# Patient Record
Sex: FEMALE | Race: WHITE | Employment: OTHER | URBAN - METROPOLITAN AREA
[De-identification: names, ages, dates, MRNs, and addresses within clinical notes are randomized per-mention and may not be internally consistent; named-entity substitution may affect disease eponyms.]

---

## 2017-02-25 ENCOUNTER — APPOINTMENT (OUTPATIENT)
Dept: GENERAL RADIOLOGY | Age: 27
End: 2017-02-25
Attending: EMERGENCY MEDICINE
Payer: MEDICAID

## 2017-02-25 ENCOUNTER — HOSPITAL ENCOUNTER (EMERGENCY)
Age: 27
Discharge: HOME | End: 2017-02-25
Attending: EMERGENCY MEDICINE
Payer: MEDICAID

## 2017-02-25 VITALS
TEMPERATURE: 102.6 F | HEIGHT: 63 IN | OXYGEN SATURATION: 97 % | HEART RATE: 136 BPM | RESPIRATION RATE: 22 BRPM | SYSTOLIC BLOOD PRESSURE: 131 MMHG | BODY MASS INDEX: 38.98 KG/M2 | DIASTOLIC BLOOD PRESSURE: 84 MMHG | WEIGHT: 220 LBS

## 2017-02-25 DIAGNOSIS — E86.0 DEHYDRATION: ICD-10-CM

## 2017-02-25 DIAGNOSIS — J10.1 INFLUENZA A: Primary | ICD-10-CM

## 2017-02-25 LAB
ALBUMIN SERPL BCP-MCNC: 4 G/DL (ref 3.4–5)
ALBUMIN/GLOB SERPL: 1 {RATIO} (ref 0.8–1.7)
ALP SERPL-CCNC: 57 U/L (ref 45–117)
ALT SERPL-CCNC: 28 U/L (ref 13–56)
ANION GAP BLD CALC-SCNC: 9 MMOL/L (ref 3–18)
APPEARANCE UR: CLEAR
AST SERPL W P-5'-P-CCNC: 18 U/L (ref 15–37)
BACTERIA URNS QL MICRO: ABNORMAL /HPF
BASOPHILS # BLD AUTO: 0 K/UL (ref 0–0.1)
BASOPHILS # BLD: 0 % (ref 0–2)
BILIRUB SERPL-MCNC: 0.4 MG/DL (ref 0.2–1)
BILIRUB UR QL: NEGATIVE
BUN SERPL-MCNC: 6 MG/DL (ref 7–18)
BUN/CREAT SERPL: 8 (ref 12–20)
CALCIUM SERPL-MCNC: 8.8 MG/DL (ref 8.5–10.1)
CHLORIDE SERPL-SCNC: 105 MMOL/L (ref 100–108)
CO2 SERPL-SCNC: 26 MMOL/L (ref 21–32)
COLOR UR: YELLOW
CREAT SERPL-MCNC: 0.71 MG/DL (ref 0.6–1.3)
DIFFERENTIAL METHOD BLD: ABNORMAL
EOSINOPHIL # BLD: 0.1 K/UL (ref 0–0.4)
EOSINOPHIL NFR BLD: 1 % (ref 0–5)
EPITH CASTS URNS QL MICRO: ABNORMAL /LPF (ref 0–5)
ERYTHROCYTE [DISTWIDTH] IN BLOOD BY AUTOMATED COUNT: 13 % (ref 11.6–14.5)
FLUAV AG NPH QL IA: POSITIVE
FLUBV AG NOSE QL IA: NEGATIVE
GLOBULIN SER CALC-MCNC: 4 G/DL (ref 2–4)
GLUCOSE SERPL-MCNC: 97 MG/DL (ref 74–99)
GLUCOSE UR STRIP.AUTO-MCNC: NEGATIVE MG/DL
HCG UR QL: NEGATIVE
HCT VFR BLD AUTO: 40.3 % (ref 35–45)
HGB BLD-MCNC: 13.8 G/DL (ref 12–16)
HGB UR QL STRIP: NEGATIVE
KETONES UR QL STRIP.AUTO: NEGATIVE MG/DL
LACTATE BLD-SCNC: 1.8 MMOL/L (ref 0.4–2)
LEUKOCYTE ESTERASE UR QL STRIP.AUTO: ABNORMAL
LYMPHOCYTES # BLD AUTO: 17 % (ref 21–52)
LYMPHOCYTES # BLD: 1.4 K/UL (ref 0.9–3.6)
MCH RBC QN AUTO: 28.8 PG (ref 24–34)
MCHC RBC AUTO-ENTMCNC: 34.2 G/DL (ref 31–37)
MCV RBC AUTO: 84 FL (ref 74–97)
MONOCYTES # BLD: 0.8 K/UL (ref 0.05–1.2)
MONOCYTES NFR BLD AUTO: 9 % (ref 3–10)
NEUTS SEG # BLD: 5.9 K/UL (ref 1.8–8)
NEUTS SEG NFR BLD AUTO: 73 % (ref 40–73)
NITRITE UR QL STRIP.AUTO: NEGATIVE
PH UR STRIP: 8.5 [PH] (ref 5–8)
PLATELET # BLD AUTO: 242 K/UL (ref 135–420)
PMV BLD AUTO: 9.6 FL (ref 9.2–11.8)
POTASSIUM SERPL-SCNC: 3.8 MMOL/L (ref 3.5–5.5)
PROT SERPL-MCNC: 8 G/DL (ref 6.4–8.2)
PROT UR STRIP-MCNC: NEGATIVE MG/DL
RBC # BLD AUTO: 4.8 M/UL (ref 4.2–5.3)
RBC #/AREA URNS HPF: ABNORMAL /HPF (ref 0–5)
SODIUM SERPL-SCNC: 140 MMOL/L (ref 136–145)
SP GR UR REFRACTOMETRY: 1.01 (ref 1–1.03)
UROBILINOGEN UR QL STRIP.AUTO: 0.2 EU/DL (ref 0.2–1)
WBC # BLD AUTO: 8.2 K/UL (ref 4.6–13.2)
WBC URNS QL MICRO: ABNORMAL /HPF (ref 0–4)

## 2017-02-25 PROCEDURE — 80053 COMPREHEN METABOLIC PANEL: CPT | Performed by: EMERGENCY MEDICINE

## 2017-02-25 PROCEDURE — 83605 ASSAY OF LACTIC ACID: CPT

## 2017-02-25 PROCEDURE — 87804 INFLUENZA ASSAY W/OPTIC: CPT | Performed by: EMERGENCY MEDICINE

## 2017-02-25 PROCEDURE — 96361 HYDRATE IV INFUSION ADD-ON: CPT

## 2017-02-25 PROCEDURE — 74011250636 HC RX REV CODE- 250/636: Performed by: EMERGENCY MEDICINE

## 2017-02-25 PROCEDURE — 81001 URINALYSIS AUTO W/SCOPE: CPT | Performed by: EMERGENCY MEDICINE

## 2017-02-25 PROCEDURE — 99285 EMERGENCY DEPT VISIT HI MDM: CPT

## 2017-02-25 PROCEDURE — 71020 XR CHEST PA LAT: CPT

## 2017-02-25 PROCEDURE — 96374 THER/PROPH/DIAG INJ IV PUSH: CPT

## 2017-02-25 PROCEDURE — 74011250637 HC RX REV CODE- 250/637: Performed by: EMERGENCY MEDICINE

## 2017-02-25 PROCEDURE — 81025 URINE PREGNANCY TEST: CPT | Performed by: EMERGENCY MEDICINE

## 2017-02-25 PROCEDURE — 85025 COMPLETE CBC W/AUTO DIFF WBC: CPT | Performed by: EMERGENCY MEDICINE

## 2017-02-25 PROCEDURE — 93005 ELECTROCARDIOGRAM TRACING: CPT

## 2017-02-25 RX ORDER — KETOROLAC TROMETHAMINE 30 MG/ML
30 INJECTION, SOLUTION INTRAMUSCULAR; INTRAVENOUS
Status: COMPLETED | OUTPATIENT
Start: 2017-02-25 | End: 2017-02-25

## 2017-02-25 RX ORDER — ACETAMINOPHEN 500 MG
1000 TABLET ORAL
Status: COMPLETED | OUTPATIENT
Start: 2017-02-25 | End: 2017-02-25

## 2017-02-25 RX ADMIN — ACETAMINOPHEN 1000 MG: 500 TABLET ORAL at 16:54

## 2017-02-25 RX ADMIN — SODIUM CHLORIDE 1000 ML: 900 INJECTION, SOLUTION INTRAVENOUS at 18:58

## 2017-02-25 RX ADMIN — SODIUM CHLORIDE 1000 ML: 900 INJECTION, SOLUTION INTRAVENOUS at 16:51

## 2017-02-25 RX ADMIN — KETOROLAC TROMETHAMINE 30 MG: 30 INJECTION, SOLUTION INTRAMUSCULAR at 18:58

## 2017-02-25 NOTE — ED TRIAGE NOTES
Pt, c/o   Cough, nasal & chest congestion,  Body aches, headache ,  Started Monday ,   Fever started wednesday, was seen at [facility] this am. Positive to  Flu,   Here  States feel sick , still having fever

## 2017-02-25 NOTE — ED PROVIDER NOTES
HPI Comments: 4:44 PM Guillermo Smith is a 31 y.o. female who presents to ED c/o persistent flu symptoms onset 17. Pt explains she was diagnosed with the flu at [facility] yesterday (swab). Pt denies receiving Tamaflu because she had symptoms for longer then 48 hours. Pt reports sxs of fever, headache, generalized myalgias, nasal congestion, chills, chest congestion, and cough. Pt states \"my chest is burning from the cough. Pt has not taken any tylenol for fever. LMP was 2016 (due to birth control). Pt denies possibility of pregnancy. Pt reports surgical hx of 3 caesarian section. Pt does not take any medications daily. Pt denies EtOH, tobacco, and recreational drug use. No other concerns at this time. PCP: Jaylene Grant MD      Patient is a 31 y.o. female presenting with cough, nasal congestion, headaches, fever, and general illness. The history is provided by the patient. Cough   Associated symptoms include chills, headaches and myalgias (generalized). Pertinent negatives include no ear pain, no rhinorrhea, no sore throat, no shortness of breath and no wheezing. Nasal Congestion    Associated symptoms include chills, congestion (nasal and chest), cough and headaches. Pertinent negatives include no ear pain, no sinus pressure, no sore throat, no shortness of breath, no rhinorrhea, no neck pain and no neck pain. Headache   Associated symptoms include headaches. Pertinent negatives include no shortness of breath. Fever    Associated symptoms include congestion (nasal and chest), headaches and cough. Pertinent negatives include no sore throat, no shortness of breath and no neck pain. Generalized Body Aches   Associated symptoms include headaches. Pertinent negatives include no shortness of breath.         Past Medical History:   Diagnosis Date    Gestational diabetes        Past Surgical History:   Procedure Laterality Date    HX  SECTION  , ,  Family History:   Problem Relation Age of Onset    Cancer Paternal Grandfather      lung- smoker    Cancer Paternal Grandmother      unknown    Cancer Mother      breast- unknown age       Social History     Social History    Marital status: SINGLE     Spouse name: N/A    Number of children: N/A    Years of education: N/A     Occupational History    Not on file. Social History Main Topics    Smoking status: Never Smoker    Smokeless tobacco: Never Used    Alcohol use No      Comment: rarely    Drug use: No    Sexual activity: Yes     Partners: Male     Birth control/ protection: Condom     Other Topics Concern    Not on file     Social History Narrative         ALLERGIES: Vicodin [hydrocodone-acetaminophen]    Review of Systems   Constitutional: Positive for chills and fever. Negative for activity change, appetite change, diaphoresis, fatigue and unexpected weight change. HENT: Positive for congestion (nasal and chest). Negative for dental problem, drooling, ear discharge, ear pain, facial swelling, hearing loss, mouth sores, nosebleeds, postnasal drip, rhinorrhea, sinus pressure, sneezing, sore throat, tinnitus, trouble swallowing and voice change. Eyes: Negative. Respiratory: Positive for cough. Negative for apnea, choking, chest tightness, shortness of breath, wheezing and stridor. Cardiovascular: Negative. Gastrointestinal: Negative. Endocrine: Negative. Genitourinary: Negative. Musculoskeletal: Positive for myalgias (generalized). Negative for arthralgias, back pain, gait problem, joint swelling, neck pain and neck stiffness. Skin: Negative. Allergic/Immunologic: Negative. Neurological: Positive for headaches. Negative for dizziness, tremors, seizures, syncope, facial asymmetry, speech difficulty, weakness, light-headedness and numbness. Hematological: Negative. Psychiatric/Behavioral: Negative.     All other systems reviewed and are negative. Vitals:    02/25/17 1000 02/25/17 1624   BP: 128/80 157/85   Pulse: (!) 164 (!) 161   Resp: 25 20   Temp: (!) 103.1 °F (39.5 °C) (!) 102.6 °F (39.2 °C)   SpO2:  96%   Weight:  99.8 kg (220 lb)   Height:  5' 3\" (1.6 m)            Physical Exam   Constitutional: She is oriented to person, place, and time. She appears well-developed and well-nourished. Alert and appropriate, mildly ill appearing in no apparent marked discomfort or acute respiratory distress   HENT:   Head: Normocephalic and atraumatic. Right Ear: External ear normal.   Left Ear: External ear normal.   Mouth/Throat: Oropharynx is clear and moist. No oropharyngeal exudate. Eyes: Conjunctivae and EOM are normal. Pupils are equal, round, and reactive to light. Right eye exhibits no discharge. Left eye exhibits no discharge. No scleral icterus. Neck: Normal range of motion. Neck supple. No JVD present. No tracheal deviation present. No thyromegaly present. Cardiovascular: Regular rhythm, normal heart sounds and intact distal pulses. Exam reveals no gallop and no friction rub. No murmur heard. Pulmonary/Chest: Effort normal and breath sounds normal. No respiratory distress. She has no wheezes. She has no rales. She exhibits no tenderness. Abdominal: Soft. Bowel sounds are normal. She exhibits no distension. There is no tenderness. There is no rebound and no guarding. Musculoskeletal: Normal range of motion. She exhibits no edema or tenderness. Lymphadenopathy:     She has no cervical adenopathy. Neurological: She is alert and oriented to person, place, and time. No cranial nerve deficit. Coordination normal.   Skin: Skin is warm. No erythema. No rashes   Psychiatric: She has a normal mood and affect. Her behavior is normal. Judgment and thought content normal.   Nursing note and vitals reviewed.        MDM  Number of Diagnoses or Management Options  Diagnosis management comments: Patient with acute febrile illness c/w influenza without clinical evidence of other SBI. Will still evaluate for possible pneumonia, sepsis, dehydration, or acute electrolyte abnormality. Will begin fluid resuscitation and antipyretics. Patient agrees with this plan.        Amount and/or Complexity of Data Reviewed  Clinical lab tests: ordered and reviewed  Tests in the radiology section of CPT®: ordered and reviewed  Review and summarize past medical records: yes    Risk of Complications, Morbidity, and/or Mortality  Presenting problems: high  Diagnostic procedures: moderate  Management options: moderate    Patient Progress  Patient progress: improved    ED Course       Procedures           Medications ordered:   Medications   sodium chloride 0.9 % bolus infusion 1,000 mL (not administered)   sodium chloride 0.9 % bolus infusion 1,000 mL (1,000 mL IntraVENous New Bag 2/25/17 1651)   sodium chloride 0.9 % bolus infusion 1,000 mL (1,000 mL IntraVENous New Bag 2/25/17 1651)   acetaminophen (TYLENOL) tablet 1,000 mg (1,000 mg Oral Given 2/25/17 1654)         Lab findings:  Labs Reviewed   INFLUENZA A & B AG (RAPID TEST) - Abnormal; Notable for the following:        Result Value    Influenza A Antigen POSITIVE (*)     All other components within normal limits   CBC WITH AUTOMATED DIFF - Abnormal; Notable for the following:     LYMPHOCYTES 17 (*)     All other components within normal limits   METABOLIC PANEL, COMPREHENSIVE - Abnormal; Notable for the following:     BUN 6 (*)     BUN/Creatinine ratio 8 (*)     All other components within normal limits   URINALYSIS W/ RFLX MICROSCOPIC   HCG URINE, QL   POC LACTIC ACID       EKG interpretation: Sinus tachycardia, rate of 165, right axis, normal intervals with non-specific t-wave changes in infero-lateral leads    X-Ray, CT or other radiology findings or impressions:  XR CHEST PA LAT    (Results Pending)   No evidence of acute cardiopulmonary disease; no focal infiltrate, cardiomegaly or effusion    Progress notes, Consult notes or additional Procedure notes: Serial neurologic and cardiovascular examinations were stable and normal except for tachycardia and fever. I discussed the clinical findings and concerns with the patient and she feels comfortable with continued symptomatic treatment and close follow-up as an outpatient. She understands that an early or an occult process cannot be completely ruled out and precautions have been given. The patient agrees with this plan. Reevaluation of patient:   I have reevaluated patient. Patient is feeling improved. 6:30 PM      Dispo:  Patient was discharged in stable condition. Patient is to return to emergency department with any new or worsening condition. Scribe Attestation:     Bhargavi Isbell, chetibing for and in the presence of  Serenity Shay MD February 25, 2017 at 6:29 PM     Physician Attestation:   I personally performed the services described in this documentation, reviewed and edited the documentation which was dictated to the scribe in my presence, and it accurately records my words and actions.  Julianne Valladares MD  February 25, 2017     Signed by: Naseem Leonard, 02/25/17, 4:59 PM

## 2017-02-26 ENCOUNTER — TELEPHONE (OUTPATIENT)
Dept: FAMILY MEDICINE CLINIC | Age: 27
End: 2017-02-26

## 2017-02-26 LAB
ATRIAL RATE: 165 BPM
CALCULATED P AXIS, ECG09: 48 DEGREES
CALCULATED R AXIS, ECG10: 105 DEGREES
CALCULATED T AXIS, ECG11: 20 DEGREES
DIAGNOSIS, 93000: NORMAL
P-R INTERVAL, ECG05: 116 MS
Q-T INTERVAL, ECG07: 296 MS
QRS DURATION, ECG06: 70 MS
QTC CALCULATION (BEZET), ECG08: 490 MS
VENTRICULAR RATE, ECG03: 165 BPM

## 2017-02-26 NOTE — DISCHARGE INSTRUCTIONS
Influenza (Flu): Care Instructions  Your Care Instructions  Influenza (flu) is an infection in the lungs and breathing passages. It is caused by the influenza virus. There are different strains, or types, of the flu virus from year to year. Unlike the common cold, the flu comes on suddenly and the symptoms, such as a cough, congestion, fever, chills, fatigue, aches, and pains, are more severe. These symptoms may last up to 10 days. Although the flu can make you feel very sick, it usually doesn't cause serious health problems. Home treatment is usually all you need for flu symptoms. But your doctor may prescribe antiviral medicine to prevent other health problems, such as pneumonia, from developing. Older people and those who have a long-term health condition, such as lung disease, are most at risk for having pneumonia or other health problems. Follow-up care is a key part of your treatment and safety. Be sure to make and go to all appointments, and call your doctor if you are having problems. Its also a good idea to know your test results and keep a list of the medicines you take. How can you care for yourself at home? · Get plenty of rest.  · Drink plenty of fluids, enough so that your urine is light yellow or clear like water. If you have kidney, heart, or liver disease and have to limit fluids, talk with your doctor before you increase the amount of fluids you drink. · Take an over-the-counter pain medicine if needed, such as acetaminophen (Tylenol), ibuprofen (Advil, Motrin), or naproxen (Aleve), to relieve fever, headache, and muscle aches. Read and follow all instructions on the label. No one younger than 20 should take aspirin. It has been linked to Reye syndrome, a serious illness. · Do not smoke. Smoking can make the flu worse. If you need help quitting, talk to your doctor about stop-smoking programs and medicines. These can increase your chances of quitting for good.   · Breathe moist air from a hot shower or from a sink filled with hot water to help clear a stuffy nose. · Before you use cough and cold medicines, check the label. These medicines may not be safe for young children or for people with certain health problems. · If the skin around your nose and lips becomes sore, put some petroleum jelly on the area. · To ease coughing:  ¨ Drink fluids to soothe a scratchy throat. ¨ Suck on cough drops or plain hard candy. ¨ Take an over-the-counter cough medicine that contains dextromethorphan to help you get some sleep. Read and follow all instructions on the label. ¨ Raise your head at night with an extra pillow. This may help you rest if coughing keeps you awake. · Take any prescribed medicine exactly as directed. Call your doctor if you think you are having a problem with your medicine. To avoid spreading the flu  · Wash your hands regularly, and keep your hands away from your face. · Stay home from school, work, and other public places until you are feeling better and your fever has been gone for at least 24 hours. The fever needs to have gone away on its own without the help of medicine. · Ask people living with you to talk to their doctors about preventing the flu. They may get antiviral medicine to keep from getting the flu from you. · To prevent the flu in the future, get a flu vaccine every fall. Encourage people living with you to get the vaccine. · Cover your mouth when you cough or sneeze. When should you call for help? Call 911 anytime you think you may need emergency care. For example, call if:  · You have severe trouble breathing. Call your doctor now or seek immediate medical care if:  · You have new or worse trouble breathing. · You seem to be getting much sicker. · You feel very sleepy or confused. · You have a new or higher fever. · You get a new rash.   Watch closely for changes in your health, and be sure to contact your doctor if:  · You begin to get better and then get worse. · You are not getting better after 1 week. Where can you learn more? Go to http://matt-rashida.info/. Enter Q125 in the search box to learn more about \"Influenza (Flu): Care Instructions. \"  Current as of: May 23, 2016  Content Version: 11.1  © 6831-2658 [employer]. Care instructions adapted under license by [employer] (which disclaims liability or warranty for this information). If you have questions about a medical condition or this instruction, always ask your healthcare professional. [place] [address] any warranty or liability for your use of this information. Dehydration: Care Instructions  Your Care Instructions  Dehydration happens when your body loses too much fluid. This might happen when you do not drink enough water or you lose large amounts of fluids from your body because of diarrhea, vomiting, or sweating. Severe dehydration can be life-threatening. Water and minerals called electrolytes help put your body fluids back in balance. Learn the early signs of fluid loss, and drink more fluids to prevent dehydration. Follow-up care is a key part of your treatment and safety. Be sure to make and go to all appointments, and call your doctor if you are having problems. It's also a good idea to know your test results and keep a list of the medicines you take. How can you care for yourself at home? · To prevent dehydration, drink plenty of fluids, enough so that your urine is light yellow or clear like water. Choose water and other caffeine-free clear liquids until you feel better. If you have kidney, heart, or liver disease and have to limit fluids, talk with your doctor before you increase the amount of fluids you drink. · If you do not feel like eating or drinking, try taking small sips of water, sports drinks, or other rehydration drinks.   · Get plenty of rest.  To prevent dehydration  · Add more fluids to your diet and daily routine, unless your doctor has told you not to. · During hot weather, drink more fluids. Drink even more fluids if you exercise a lot. Stay away from drinks with alcohol or caffeine. · Watch for the symptoms of dehydration. These include:  ¨ A dry, sticky mouth. ¨ Dark yellow urine, and not much of it. ¨ Dry and sunken eyes. ¨ Feeling very tired. · Learn what problems can lead to dehydration. These include:  ¨ Diarrhea, fever, and vomiting. ¨ Any illness with a fever, such as pneumonia or the flu. ¨ Activities that cause heavy sweating, such as endurance races and heavy outdoor work in hot or humid weather. ¨ Alcohol or drug abuse or withdrawal.  ¨ Certain medicines, such as cold and allergy pills (antihistamines), diet pills (diuretics), and laxatives. ¨ Certain diseases, such as diabetes, cancer, and heart or kidney disease. When should you call for help? Call 911 anytime you think you may need emergency care. For example, call if:  · You passed out (lost consciousness). Call your doctor now or seek immediate medical care if:  · You are confused and cannot think clearly. · You are dizzy or lightheaded, or you feel like you may faint. · You have signs of needing more fluids. You have sunken eyes and a dry mouth, and you pass only a little dark urine. · You cannot keep fluids down. Watch closely for changes in your health, and be sure to contact your doctor if:  · You are not making tears. · Your skin is very dry and sags slowly back into place after you pinch it. · Your mouth and eyes are very dry. Where can you learn more? Go to http://matt-rashida.info/. Enter G550 in the search box to learn more about \"Dehydration: Care Instructions. \"  Current as of: May 27, 2016  Content Version: 11.1  © 6393-8261 [employer]. Care instructions adapted under license by [employer] (which disclaims liability or warranty for this information).  If you have questions about a medical condition or this instruction, always ask your healthcare professional. [place] [address] any warranty or liability for your use of this information.

## 2017-02-26 NOTE — ED NOTES
PT discharged per MD orders. PT was given discharge instructions and PT verbalized understanding.  PT ambulated from ED

## 2017-02-26 NOTE — ED NOTES
PT reports being diagnosed with flu yesterday, was seen and had high heart rate and elevated BP per PT report, today present with heart rate in 160-170s, labs collected and fluids initiated, safety intact, will continue to monitor

## 2017-02-27 ENCOUNTER — OFFICE VISIT (OUTPATIENT)
Dept: FAMILY MEDICINE CLINIC | Facility: CLINIC | Age: 27
End: 2017-02-27

## 2017-02-27 VITALS
OXYGEN SATURATION: 98 % | HEIGHT: 63 IN | TEMPERATURE: 97.7 F | WEIGHT: 228 LBS | RESPIRATION RATE: 18 BRPM | BODY MASS INDEX: 40.4 KG/M2 | HEART RATE: 89 BPM | SYSTOLIC BLOOD PRESSURE: 145 MMHG | DIASTOLIC BLOOD PRESSURE: 90 MMHG

## 2017-02-27 DIAGNOSIS — J11.1 INFLUENZA: Primary | ICD-10-CM

## 2017-02-27 DIAGNOSIS — J02.9 PHARYNGITIS, UNSPECIFIED ETIOLOGY: ICD-10-CM

## 2017-02-27 RX ORDER — DEXTROMETHORPHAN HYDROBROMIDE, GUAIFENESIN 5; 100 MG/5ML; MG/5ML
650 LIQUID ORAL
COMMUNITY
End: 2019-03-06

## 2017-02-27 RX ORDER — GUAIFENESIN DEXTROMETHORPHAN HYDROBROMIDE ORAL SOLUTION 10; 100 MG/5ML; MG/5ML
10 SOLUTION ORAL
Qty: 240 ML | Refills: 0 | Status: SHIPPED | OUTPATIENT
Start: 2017-02-27 | End: 2017-05-05

## 2017-02-27 RX ORDER — OSELTAMIVIR PHOSPHATE 75 MG/1
75 CAPSULE ORAL 2 TIMES DAILY
Qty: 10 CAP | Refills: 0 | Status: SHIPPED | OUTPATIENT
Start: 2017-02-27 | End: 2017-03-04

## 2017-02-27 RX ORDER — PREDNISONE 10 MG/1
TABLET ORAL
Qty: 20 TAB | Refills: 0 | Status: SHIPPED | OUTPATIENT
Start: 2017-02-27 | End: 2017-05-05 | Stop reason: SDUPTHER

## 2017-02-27 NOTE — PROGRESS NOTES
Chief Complaint   Patient presents with    Flu     past week     HPI:    ER follow-up for Flu and elevated heart rate     c/o persistent flu symptoms onset 2/19/17. Pt explains she was diagnosed with the flu at [facility] yesterday (swab). Pt denies receiving Tamaflu because she had symptoms for longer then 48 hours. Pt reports sxs of fever, headache, generalized myalgias, nasal congestion, chills, chest congestion, and cough. Pt states \"my chest and throat is burning from the cough. Pt has taken any tylenol for fever. She was told she had elevated heart rate in 170s at the ER and is following up. She tells me her fever is better uncontrolled but still not feeling well. There is no problem list on file for this patient. Review of Systems   Complete ROS negative except where noted in HPI    Objective:     Visit Vitals    /90    Pulse 89    Temp 97.7 °F (36.5 °C) (Oral)    Resp 18    Ht 5' 3\" (1.6 m)    Wt 228 lb (103.4 kg)    LMP 11/21/2016  Comment: Nexplanon    SpO2 98%    BMI 40.39 kg/m2     No exam data present    Constitutional: NAD, A & O x 3  Lungs: CTAB  Cardiovascular: RRR    Helen was seen today for flu. Diagnoses and all orders for this visit:    Influenza  -     oseltamivir (TAMIFLU) 75 mg capsule; Take 1 Cap by mouth two (2) times a day for 5 days.  -     guaiFENesin-dextromethorphan (GUAIFENESIN-DM)  mg/5 mL liqd; Take 10 mL by mouth every four (4) hours as needed. Pharyngitis, unspecified etiology  -     predniSONE (DELTASONE) 10 mg tablet; 4 tabs for 5 days    ER records reviewed. Fever likely driving tachycardia. Patient is not tachycardic today and temp WNL    I have discussed the diagnosis with the patient and the intended plan as seen in the above orders. The patient has received an after-visit summary and questions were answered concerning future plans. I have discussed medication side effects and warnings with the patient as well.  I have reviewed the plan of care with the patient, accepted their input and they are in agreement with the treatment goals. Patient verbalizes understanding. Follow-up Disposition:  Return if symptoms worsen or fail to improve.

## 2017-02-27 NOTE — PATIENT INSTRUCTIONS
Influenza (Flu): Care Instructions  Your Care Instructions  Influenza (flu) is an infection in the lungs and breathing passages. It is caused by the influenza virus. There are different strains, or types, of the flu virus from year to year. Unlike the common cold, the flu comes on suddenly and the symptoms, such as a cough, congestion, fever, chills, fatigue, aches, and pains, are more severe. These symptoms may last up to 10 days. Although the flu can make you feel very sick, it usually doesn't cause serious health problems. Home treatment is usually all you need for flu symptoms. But your doctor may prescribe antiviral medicine to prevent other health problems, such as pneumonia, from developing. Older people and those who have a long-term health condition, such as lung disease, are most at risk for having pneumonia or other health problems. Follow-up care is a key part of your treatment and safety. Be sure to make and go to all appointments, and call your doctor if you are having problems. Its also a good idea to know your test results and keep a list of the medicines you take. How can you care for yourself at home? · Get plenty of rest.  · Drink plenty of fluids, enough so that your urine is light yellow or clear like water. If you have kidney, heart, or liver disease and have to limit fluids, talk with your doctor before you increase the amount of fluids you drink. · Take an over-the-counter pain medicine if needed, such as acetaminophen (Tylenol), ibuprofen (Advil, Motrin), or naproxen (Aleve), to relieve fever, headache, and muscle aches. Read and follow all instructions on the label. No one younger than 20 should take aspirin. It has been linked to Reye syndrome, a serious illness. · Do not smoke. Smoking can make the flu worse. If you need help quitting, talk to your doctor about stop-smoking programs and medicines. These can increase your chances of quitting for good.   · Breathe moist air from a hot shower or from a sink filled with hot water to help clear a stuffy nose. · Before you use cough and cold medicines, check the label. These medicines may not be safe for young children or for people with certain health problems. · If the skin around your nose and lips becomes sore, put some petroleum jelly on the area. · To ease coughing:  ¨ Drink fluids to soothe a scratchy throat. ¨ Suck on cough drops or plain hard candy. ¨ Take an over-the-counter cough medicine that contains dextromethorphan to help you get some sleep. Read and follow all instructions on the label. ¨ Raise your head at night with an extra pillow. This may help you rest if coughing keeps you awake. · Take any prescribed medicine exactly as directed. Call your doctor if you think you are having a problem with your medicine. To avoid spreading the flu  · Wash your hands regularly, and keep your hands away from your face. · Stay home from school, work, and other public places until you are feeling better and your fever has been gone for at least 24 hours. The fever needs to have gone away on its own without the help of medicine. · Ask people living with you to talk to their doctors about preventing the flu. They may get antiviral medicine to keep from getting the flu from you. · To prevent the flu in the future, get a flu vaccine every fall. Encourage people living with you to get the vaccine. · Cover your mouth when you cough or sneeze. When should you call for help? Call 911 anytime you think you may need emergency care. For example, call if:  · You have severe trouble breathing. Call your doctor now or seek immediate medical care if:  · You have new or worse trouble breathing. · You seem to be getting much sicker. · You feel very sleepy or confused. · You have a new or higher fever. · You get a new rash.   Watch closely for changes in your health, and be sure to contact your doctor if:  · You begin to get better and then get worse. · You are not getting better after 1 week. Where can you learn more? Go to http://matt-rashida.info/. Enter C785 in the search box to learn more about \"Influenza (Flu): Care Instructions. \"  Current as of: May 23, 2016  Content Version: 11.1  © 2024-0887 [employer]icianPortal. Care instructions adapted under license by [employer] (which disclaims liability or warranty for this information). If you have questions about a medical condition or this instruction, always ask your healthcare professional. [address] any warranty or liability for your use of this information. Sore Throat: Care Instructions  Your Care Instructions    Infection by bacteria or a virus causes most sore throats. Cigarette smoke, dry air, air pollution, allergies, and yelling can also cause a sore throat. Sore throats can be painful and annoying. Fortunately, most sore throats go away on their own. If you have a bacterial infection, your doctor may prescribe antibiotics. Follow-up care is a key part of your treatment and safety. Be sure to make and go to all appointments, and call your doctor if you are having problems. It's also a good idea to know your test results and keep a list of the medicines you take. How can you care for yourself at home? · If your doctor prescribed antibiotics, take them as directed. Do not stop taking them just because you feel better. You need to take the full course of antibiotics. · Gargle with warm salt water once an hour to help reduce swelling and relieve discomfort. Use 1 teaspoon of salt mixed in 1 cup of warm water. · Take an over-the-counter pain medicine, such as acetaminophen (Tylenol), ibuprofen (Advil, Motrin), or naproxen (Aleve). Read and follow all instructions on the label. · Be careful when taking over-the-counter cold or flu medicines and Tylenol at the same time.  Many of these medicines have acetaminophen, which is Tylenol. Read the labels to make sure that you are not taking more than the recommended dose. Too much acetaminophen (Tylenol) can be harmful. · Drink plenty of fluids. Fluids may help soothe an irritated throat. Hot fluids, such as tea or soup, may help decrease throat pain. · Use over-the-counter throat lozenges to soothe pain. Regular cough drops or hard candy may also help. These should not be given to young children because of the risk of choking. · Do not smoke or allow others to smoke around you. If you need help quitting, talk to your doctor about stop-smoking programs and medicines. These can increase your chances of quitting for good. · Use a vaporizer or humidifier to add moisture to your bedroom. Follow the directions for cleaning the machine. When should you call for help? Call your doctor now or seek immediate medical care if:  · You have new or worse trouble swallowing. · Your sore throat gets much worse on one side. Watch closely for changes in your health, and be sure to contact your doctor if you do not get better as expected. Where can you learn more? Go to http://matt-rashida.info/. Enter 062 [id number] 80 [id number] in the search box to learn more about \"Sore Throat: Care Instructions. \"  Current as of: July 29, 2016  Content Version: 11.1  © 9157-1664 [employer]. Care instructions adapted under license by [employer] (which disclaims liability or warranty for this information). If you have questions about a medical condition or this instruction, always ask your healthcare professional. [place] [address] any warranty or liability for your use of this information.

## 2017-02-27 NOTE — TELEPHONE ENCOUNTER
Pt called on 2/25/17 via answering service. Pt reports temp of 103.8. Pt advised to seek emergency care.

## 2017-02-27 NOTE — MR AVS SNAPSHOT
Visit Information Date & Time Provider Department Dept. Phone Encounter #  
 2/27/2017 12:00 PM Amy Sullivan MD [facility] [id number] Follow-up Instructions Return if symptoms worsen or fail to improve. Your Appointments 2/27/2017 12:00 PM  
SAME DAY with Amy Sullivan MD  
[facility] ([address]) Appt Note: poss flu symptoms 14 [facility] [address] [place] [address]  
[phone]  
  
   
 14 [facility] [address]  
  
    
 6/29/2017  9:00 AM  
PHYSICAL with Amy Sullivan MD  
[facility] ([address]) Appt Note: 1 yr f/u-Physical  
 14 [facility] 1 [facility] [id number] [phone]  
  
   
 14 [facility] [address] [place] [address] Upcoming Health Maintenance Date Due  
 PAP AKA CERVICAL CYTOLOGY 1/1/2016 INFLUENZA AGE 9 TO ADULT 8/1/2016 DTaP/Tdap/Td series (2 - Td) 10/15/2023 Allergies as of 2/27/2017  Review Complete On: 2/27/2017 By: Amy Sullivan MD  
  
 Severity Noted Reaction Type Reactions Vicodin [Hydrocodone-acetaminophen]  09/13/2012    Anxiety Current Immunizations  Never Reviewed Name Date Influenza Vaccine 10/28/2015 Tdap 10/15/2013  6:10 PM  
  
 Not reviewed this visit You Were Diagnosed With   
  
 Codes Comments Influenza    -  Primary ICD-10-CM: J11.1 ICD-9-CM: 174.2 Pharyngitis, unspecified etiology     ICD-10-CM: J02.9 ICD-9-CM: 159 Vitals BP  
  
  
  
  
  
 145/90 BMI and BSA Data Body Mass Index Body Surface Area  
 40.39 kg/m 2 2.14 m 2 Preferred Pharmacy Pharmacy Name Phone [id number]. Amadeo Goldberg, [address] [facility] [phone] Your Updated Medication List  
  
   
 This list is accurate as of: 17 11:54 AM.  Always use your most recent med list.  
  
  
  
  
 albuterol 90 mcg/actuation inhaler Commonly known as:  PROVENTIL HFA, VENTOLIN HFA, PROAIR HFA Take 1-2 Puffs by inhalation every four (4) hours as needed for Wheezing. fluticasone 50 mcg/actuation nasal spray Commonly known as:  Shelvia Birks 2 Sprays by Both Nostrils route daily. Administer to right and left nostrils. guaiFENesin-dextromethorphan  mg/5 mL Liqd Commonly known as:  guaiFENesin-DM Take 10 mL by mouth every four (4) hours as needed. ibuprofen 600 mg tablet Commonly known as:  MOTRIN Take 1 Tab by mouth every eight (8) hours as needed for Pain. oseltamivir 75 mg capsule Commonly known as:  TAMIFLU Take 1 Cap by mouth two (2) times a day for 5 days. predniSONE 10 mg tablet Commonly known as:  DELTASONE  
4 tabs for 5 days TYLENOL ARTHRITIS PAIN 650 mg CR tablet Generic drug:  acetaminophen Take 650 mg by mouth every six (6) hours as needed for Pain. Prescriptions Sent to Pharmacy Refills  
 oseltamivir (TAMIFLU) 75 mg capsule 0 Sig: Take 1 Cap by mouth two (2) times a day for 5 days. Class: Normal  
 Pharmacy: [id number] Ph #: [phone] Route: Oral  
 predniSONE (DELTASONE) 10 mg tablet 0 Si tabs for 5 days Class: Normal  
 Pharmacy: [id number] Ph #: [phone]  
 guaiFENesin-dextromethorphan (GUAIFENESIN-DM)  mg/5 mL liqd 0 Sig: Take 10 mL by mouth every four (4) hours as needed. Class: Normal  
 Pharmacy: [id number] [address] Ph #: [phone] Route: Oral  
  
Follow-up Instructions Return if symptoms worsen or fail to improve. Patient Instructions Influenza (Flu): Care Instructions Your Care Instructions Influenza (flu) is an infection in the lungs and breathing passages. It is caused by the influenza virus. There are different strains, or types, of the flu virus from year to year. Unlike the common cold, the flu comes on suddenly and the symptoms, such as a cough, congestion, fever, chills, fatigue, aches, and pains, are more severe. These symptoms may last up to 10 days. Although the flu can make you feel very sick, it usually doesn't cause serious health problems. Home treatment is usually all you need for flu symptoms. But your doctor may prescribe antiviral medicine to prevent other health problems, such as pneumonia, from developing. Older people and those who have a long-term health condition, such as lung disease, are most at risk for having pneumonia or other health problems. Follow-up care is a key part of your treatment and safety. Be sure to make and go to all appointments, and call your doctor if you are having problems. Its also a good idea to know your test results and keep a list of the medicines you take. How can you care for yourself at home? · Get plenty of rest. 
· Drink plenty of fluids, enough so that your urine is light yellow or clear like water. If you have kidney, heart, or liver disease and have to limit fluids, talk with your doctor before you increase the amount of fluids you drink. · Take an over-the-counter pain medicine if needed, such as acetaminophen (Tylenol), ibuprofen (Advil, Motrin), or naproxen (Aleve), to relieve fever, headache, and muscle aches. Read and follow all instructions on the label. No one younger than 20 should take aspirin. It has been linked to Reye syndrome, a serious illness. · Do not smoke. Smoking can make the flu worse. If you need help quitting, talk to your doctor about stop-smoking programs and medicines. These can increase your chances of quitting for good.  
· Breathe moist air from a hot shower or from a sink filled with hot water to help clear a stuffy nose. · Before you use cough and cold medicines, check the label. These medicines may not be safe for young children or for people with certain health problems. · If the skin around your nose and lips becomes sore, put some petroleum jelly on the area. · To ease coughing: ¨ Drink fluids to soothe a scratchy throat. ¨ Suck on cough drops or plain hard candy. ¨ Take an over-the-counter cough medicine that contains dextromethorphan to help you get some sleep. Read and follow all instructions on the label. ¨ Raise your head at night with an extra pillow. This may help you rest if coughing keeps you awake. · Take any prescribed medicine exactly as directed. Call your doctor if you think you are having a problem with your medicine. To avoid spreading the flu · Wash your hands regularly, and keep your hands away from your face. · Stay home from school, work, and other public places until you are feeling better and your fever has been gone for at least 24 hours. The fever needs to have gone away on its own without the help of medicine. · Ask people living with you to talk to their doctors about preventing the flu. They may get antiviral medicine to keep from getting the flu from you. · To prevent the flu in the future, get a flu vaccine every fall. Encourage people living with you to get the vaccine. · Cover your mouth when you cough or sneeze. When should you call for help? Call 911 anytime you think you may need emergency care. For example, call if: 
· You have severe trouble breathing. Call your doctor now or seek immediate medical care if: 
· You have new or worse trouble breathing. · You seem to be getting much sicker. · You feel very sleepy or confused. · You have a new or higher fever. · You get a new rash. Watch closely for changes in your health, and be sure to contact your doctor if: 
· You begin to get better and then get worse. · You are not getting better after 1 week. Where can you learn more? Go to http://matt-rashida.info/. Enter A792 in the search box to learn more about \"Influenza (Flu): Care Instructions. \" Current as of: May 23, 2016 Content Version: 11.1 © 5305-1542 Klood. Care instructions adapted under license by [employer] (which disclaims liability or warranty for this information). If you have questions about a medical condition or this instruction, always ask your healthcare professional. [address] any warranty or liability for your use of this information. Sore Throat: Care Instructions Your Care Instructions Infection by bacteria or a virus causes most sore throats. Cigarette smoke, dry air, air pollution, allergies, and yelling can also cause a sore throat. Sore throats can be painful and annoying. Fortunately, most sore throats go away on their own. If you have a bacterial infection, your doctor may prescribe antibiotics. Follow-up care is a key part of your treatment and safety. Be sure to make and go to all appointments, and call your doctor if you are having problems. It's also a good idea to know your test results and keep a list of the medicines you take. How can you care for yourself at home? · If your doctor prescribed antibiotics, take them as directed. Do not stop taking them just because you feel better. You need to take the full course of antibiotics. · Gargle with warm salt water once an hour to help reduce swelling and relieve discomfort. Use 1 teaspoon of salt mixed in 1 cup of warm water. · Take an over-the-counter pain medicine, such as acetaminophen (Tylenol), ibuprofen (Advil, Motrin), or naproxen (Aleve). Read and follow all instructions on the label. · Be careful when taking over-the-counter cold or flu medicines and Tylenol at the same time.  Many of these medicines have acetaminophen, which is Tylenol. Read the labels to make sure that you are not taking more than the recommended dose. Too much acetaminophen (Tylenol) can be harmful. · Drink plenty of fluids. Fluids may help soothe an irritated throat. Hot fluids, such as tea or soup, may help decrease throat pain. · Use over-the-counter throat lozenges to soothe pain. Regular cough drops or hard candy may also help. These should not be given to young children because of the risk of choking. · Do not smoke or allow others to smoke around you. If you need help quitting, talk to your doctor about stop-smoking programs and medicines. These can increase your chances of quitting for good. · Use a vaporizer or humidifier to add moisture to your bedroom. Follow the directions for cleaning the machine. When should you call for help? Call your doctor now or seek immediate medical care if: 
· You have new or worse trouble swallowing. · Your sore throat gets much worse on one side. Watch closely for changes in your health, and be sure to contact your doctor if you do not get better as expected. Where can you learn more? Go to http://matt-rashida.info/. Enter [id number] in the search box to learn more about \"Sore Throat: Care Instructions. \" Current as of: July 29, 2016 Content Version: 11.1 © 7758-7782 Shidonni, Incorporated. Care instructions adapted under license by [employer] (which disclaims liability or warranty for this information). If you have questions about a medical condition or this instruction, always ask your healthcare professional. [place] [address] any warranty or liability for your use of this information. Introducing [facility]! Dear Samina Bentley: 
Thank you for requesting a Mango DSP account. Our records indicate that you already have an active Mango DSP account. You can access your account anytime at [email or web address] Did you know that you can access your hospital and ER discharge instructions at any time in [employer]? You can also review all of your test results from your hospital stay or ER visit. Additional Information If you have questions, please visit the Frequently Asked Questions section of the [employer] website at [email or web address] Remember, [employer] is NOT to be used for urgent needs. For medical emergencies, dial 911. Now available from your iPhone and Android! Please provide this summary of care documentation to your next provider. Your primary care clinician is listed as Vicky Arellano. If you have any questions after today's visit, please call [phone].

## 2017-05-05 ENCOUNTER — OFFICE VISIT (OUTPATIENT)
Dept: FAMILY MEDICINE CLINIC | Facility: CLINIC | Age: 27
End: 2017-05-05

## 2017-05-05 VITALS
TEMPERATURE: 98.1 F | SYSTOLIC BLOOD PRESSURE: 130 MMHG | WEIGHT: 234 LBS | HEIGHT: 63 IN | RESPIRATION RATE: 16 BRPM | DIASTOLIC BLOOD PRESSURE: 95 MMHG | HEART RATE: 87 BPM | OXYGEN SATURATION: 97 % | BODY MASS INDEX: 41.46 KG/M2

## 2017-05-05 DIAGNOSIS — L25.9 CONTACT DERMATITIS, UNSPECIFIED CONTACT DERMATITIS TYPE, UNSPECIFIED TRIGGER: Primary | ICD-10-CM

## 2017-05-05 RX ORDER — PREDNISONE 10 MG/1
TABLET ORAL
Qty: 20 TAB | Refills: 0 | Status: SHIPPED | OUTPATIENT
Start: 2017-05-05 | End: 2019-03-06

## 2017-05-05 RX ORDER — DIPHENHYDRAMINE HCL 25 MG
50 CAPSULE ORAL
COMMUNITY
End: 2019-11-10

## 2017-05-05 RX ORDER — TRIAMCINOLONE ACETONIDE 1 MG/G
OINTMENT TOPICAL 2 TIMES DAILY
Qty: 30 G | Refills: 0 | Status: SHIPPED | OUTPATIENT
Start: 2017-05-05 | End: 2019-03-06

## 2017-05-05 NOTE — PATIENT INSTRUCTIONS
Dermatitis: Care Instructions  Your Care Instructions  Dermatitis is the general name used for any rash or inflammation of the skin. Different kinds of dermatitis cause different kinds of rashes. Common causes of a rash include new medicines, plants (such as poison oak or poison ivy), heat, and stress. Certain illnesses can also cause a rash. An allergic reaction to something that touches your skin, such as latex, nickel, or poison ivy, is called contact dermatitis. Contact dermatitis may also be caused by something that irritates the skin, such as bleach, a chemical, or soap. These types of rashes cannot be spread from person to person. How long your rash will last depends on what caused it. Rashes may last a few days or months. Follow-up care is a key part of your treatment and safety. Be sure to make and go to all appointments, and call your doctor if you are having problems. It's also a good idea to know your test results and keep a list of the medicines you take. How can you care for yourself at home? · Do not scratch the rash. Cut your nails short, and file them smooth. Or wear gloves if this helps keep you from scratching. · Wash the area with water only. Pat dry. · Put cold, wet cloths on the rash to reduce itching. · Keep cool, and stay out of the sun. · Leave the rash open to the air as much as possible. · If the rash itches, use hydrocortisone cream. Follow the directions on the label. Calamine lotion may help for plant rashes. · Take an over-the-counter antihistamine, such as diphenhydramine (Benadryl) or loratadine (Claritin), to help calm the itching. Read and follow all instructions on the label. · If your doctor prescribed a cream, use it as directed. If your doctor prescribed medicine, take it exactly as directed. When should you call for help?   Call your doctor now or seek immediate medical care if:  · You have symptoms of infection, such as:  ¨ Increased pain, swelling, warmth, or redness. ¨ Red streaks leading from the area. ¨ Pus draining from the area. ¨ A fever. · You have joint pain along with the rash. Watch closely for changes in your health, and be sure to contact your doctor if:  · Your rash is changing or getting worse. · You are not getting better as expected. Where can you learn more? Go to http://matt-rashida.info/. Enter [phone] [id number] in the search box to learn more about \"Dermatitis: Care Instructions. \"  Current as of: October 13, 2016  Content Version: 11.2  © 7991-9759 CommonFloor. Care instructions adapted under license by [employer] (which disclaims liability or warranty for this information). If you have questions about a medical condition or this instruction, always ask your healthcare professional. [address] any warranty or liability for your use of this information.

## 2017-05-05 NOTE — MR AVS SNAPSHOT
Visit Information Date & Time Provider Department Dept. Phone Encounter #  
 5/5/2017  2:00 PM Emy Leon NP [employer] [phone] [id number] Follow-up Instructions Return if symptoms worsen or fail to improve. Your Appointments 6/29/2017  9:00 AM  
PHYSICAL with Leilani Mckeon MD  
[facility] ([facility]) Appt Note: 1 yr f/u-Physical  
 [address] [address]  
[phone]  
  
   
 [address] Upcoming Health Maintenance Date Due  
 PAP AKA CERVICAL CYTOLOGY 1/1/2016 INFLUENZA AGE 9 TO ADULT 8/1/2017 DTaP/Tdap/Td series (2 - Td) 10/15/2023 Allergies as of 5/5/2017  Review Complete On: 5/5/2017 By: Kirsten Delcid Severity Noted Reaction Type Reactions Vicodin [Hydrocodone-acetaminophen]  09/13/2012    Anxiety Current Immunizations  Never Reviewed Name Date Influenza Vaccine 10/28/2015 Tdap 10/15/2013  6:10 PM  
  
 Not reviewed this visit You Were Diagnosed With   
  
 Codes Comments Contact dermatitis, unspecified contact dermatitis type, unspecified trigger    -  Primary ICD-10-CM: L25.9 ICD-9-CM: 692.9 Vitals BP Pulse Temp Resp Height(growth percentile) Weight(growth percentile) (!) 130/95 87 98.1 °F (36.7 °C) 16 5' 3\" (1.6 m) 234 lb (106.1 kg) SpO2 BMI OB Status Smoking Status 97% 41.45 kg/m2 Chemically Induced Never Smoker Vitals History BMI and BSA Data Body Mass Index Body Surface Area  
 41.45 kg/m 2 2.17 m 2 Preferred Pharmacy Pharmacy Name Phone [id number]. [facility] [address] [facility] [phone] Your Updated Medication List  
  
   
This list is accurate as of: 5/5/17  2:27 PM.  Always use your most recent med list.  
  
  
  
  
 albuterol 90 mcg/actuation inhaler Commonly known as:  PROVENTIL HFA, VENTOLIN HFA, PROAIR HFA Take 1-2 Puffs by inhalation every four (4) hours as needed for Wheezing. BENADRYL 25 mg capsule Generic drug:  diphenhydrAMINE Take 50 mg by mouth two (2) times a day. fluticasone 50 mcg/actuation nasal spray Commonly known as:  Peyton Najjar 2 Sprays by Both Nostrils route daily. Administer to right and left nostrils. ibuprofen 600 mg tablet Commonly known as:  MOTRIN Take 1 Tab by mouth every eight (8) hours as needed for Pain. predniSONE 10 mg tablet Commonly known as:  DELTASONE  
4 tabs for 5 days  
  
 triamcinolone acetonide 0.1 % ointment Commonly known as:  KENALOG Apply  to affected area two (2) times a day. use thin layer TYLENOL ARTHRITIS PAIN 650 mg CR tablet Generic drug:  acetaminophen Take 650 mg by mouth every six (6) hours as needed for Pain. Prescriptions Sent to Pharmacy Refills  
 predniSONE (DELTASONE) 10 mg tablet 0 Si tabs for 5 days Class: Normal  
 Pharmacy: [id number]. [address] Ph #: [phone]  
 triamcinolone acetonide (KENALOG) 0.1 % ointment 0 Sig: Apply  to affected area two (2) times a day. use thin layer Class: Normal  
 Pharmacy: [id number]. [address] Ph #: [phone] Route: Topical  
  
Follow-up Instructions Return if symptoms worsen or fail to improve. Patient Instructions Dermatitis: Care Instructions Your Care Instructions Dermatitis is the general name used for any rash or inflammation of the skin. Different kinds of dermatitis cause different kinds of rashes. Common causes of a rash include new medicines, plants (such as poison oak or poison ivy), heat, and stress. Certain illnesses can also cause a rash.  
An allergic reaction to something that touches your skin, such as latex, nickel, or poison ivy, is called contact dermatitis. Contact dermatitis may also be caused by something that irritates the skin, such as bleach, a chemical, or soap. These types of rashes cannot be spread from person to person. How long your rash will last depends on what caused it. Rashes may last a few days or months. Follow-up care is a key part of your treatment and safety. Be sure to make and go to all appointments, and call your doctor if you are having problems. It's also a good idea to know your test results and keep a list of the medicines you take. How can you care for yourself at home? · Do not scratch the rash. Cut your nails short, and file them smooth. Or wear gloves if this helps keep you from scratching. · Wash the area with water only. Pat dry. · Put cold, wet cloths on the rash to reduce itching. · Keep cool, and stay out of the sun. · Leave the rash open to the air as much as possible. · If the rash itches, use hydrocortisone cream. Follow the directions on the label. Calamine lotion may help for plant rashes. · Take an over-the-counter antihistamine, such as diphenhydramine (Benadryl) or loratadine (Claritin), to help calm the itching. Read and follow all instructions on the label. · If your doctor prescribed a cream, use it as directed. If your doctor prescribed medicine, take it exactly as directed. When should you call for help? Call your doctor now or seek immediate medical care if: 
· You have symptoms of infection, such as: 
¨ Increased pain, swelling, warmth, or redness. ¨ Red streaks leading from the area. ¨ Pus draining from the area. ¨ A fever. · You have joint pain along with the rash. Watch closely for changes in your health, and be sure to contact your doctor if: 
· Your rash is changing or getting worse. · You are not getting better as expected. Where can you learn more? Go to http://matt-rashida.info/. Enter [phone] in the search box to learn more about \"Dermatitis: Care Instructions. \" Current as of: October 13, 2016 Content Version: 11.2 © 1899-7776 [employer]. Care instructions adapted under license by [employer] (which disclaims liability or warranty for this information). If you have questions about a medical condition or this instruction, always ask your healthcare professional. [place] [address] any warranty or liability for your use of this information. Introducing [facility]! Dear Brett Cartwright: 
Thank you for requesting a Gourmant account. Our records indicate that you already have an active Gourmant account. You can access your account anytime at [email or web address] Did you know that you can access your hospital and ER discharge instructions at any time in Gourmant? You can also review all of your test results from your hospital stay or ER visit. Additional Information If you have questions, please visit the Frequently Asked Questions section of the Gourmant website at [email or web address] Remember, Gourmant is NOT to be used for urgent needs. For medical emergencies, dial 911. Now available from your iPhone and Android! Please provide this summary of care documentation to your next provider. Your primary care clinician is listed as Kimberly Mera. If you have any questions after today's visit, please call [phone].

## 2017-05-05 NOTE — PROGRESS NOTES
HISTORY OF PRESENT ILLNESS  Darya Short is a 31 y.o. female. HPI Comments: Acute care visit with c/o red rash to her rt lower arm x 1 week. She does not recall contact with irritants. Denies any shortness of breath or wheezing. She does recall getting a tattoo on the same arm about 2 weeks ago. She takes benadryl daily. Skin Problem   The history is provided by the patient. This is a new problem. The current episode started more than 2 days ago. The problem occurs constantly. The problem has not changed since onset. Pertinent negatives include no chest pain, no abdominal pain, no headaches and no shortness of breath. She has tried Benadryl for the symptoms. Review of Systems   Constitutional: Negative. Respiratory: Negative. Negative for shortness of breath. Cardiovascular: Negative for chest pain. Gastrointestinal: Negative for abdominal pain. Genitourinary: Negative. Musculoskeletal: Negative. Skin: Positive for rash. Neurological: Negative. Negative for headaches. Endo/Heme/Allergies: Positive for environmental allergies. Past Medical History:   Diagnosis Date    Gestational diabetes      Past Surgical History:   Procedure Laterality Date    HX  SECTION  , ,      Current Outpatient Prescriptions on File Prior to Visit   Medication Sig Dispense Refill    acetaminophen (TYLENOL ARTHRITIS PAIN) 650 mg CR tablet Take 650 mg by mouth every six (6) hours as needed for Pain.  ibuprofen (MOTRIN) 600 mg tablet Take 1 Tab by mouth every eight (8) hours as needed for Pain. 21 Tab 0    fluticasone (FLONASE) 50 mcg/actuation nasal spray 2 Sprays by Both Nostrils route daily. Administer to right and left nostrils. 1 Bottle 6    albuterol (PROVENTIL HFA, VENTOLIN HFA, PROAIR HFA) 90 mcg/actuation inhaler Take 1-2 Puffs by inhalation every four (4) hours as needed for Wheezing.  1 Inhaler 6     No current facility-administered medications on file prior to visit. Allergies and Intolerances: Allergies   Allergen Reactions    Vicodin [Hydrocodone-Acetaminophen] Anxiety       Family History:   Family History   Problem Relation Age of Onset    Cancer Paternal Grandfather      lung- smoker    Cancer Paternal Grandmother      unknown    Cancer Mother      breast- unknown age       Social History:   She  reports that she has never smoked. She has never used smokeless tobacco. She  reports that she does not drink alcohol. Vitals:   Visit Vitals    BP (!) 130/95    Pulse 87    Temp 98.1 °F (36.7 °C)    Resp 16    Ht 5' 3\" (1.6 m)    Wt 234 lb (106.1 kg)    SpO2 97%    BMI 41.45 kg/m2     Body surface area is 2.17 meters squared. Physical Exam   Constitutional: She is oriented to person, place, and time. She appears well-developed and well-nourished. HENT:   Head: Atraumatic. Cardiovascular: Normal rate. Pulmonary/Chest: Effort normal.   Neurological: She is alert and oriented to person, place, and time. Skin: Skin is warm. Psychiatric: She has a normal mood and affect. Her behavior is normal.   Vitals reviewed. ASSESSMENT and PLAN    ICD-10-CM ICD-9-CM    1. Contact dermatitis, unspecified contact dermatitis type, unspecified trigger L25.9 692.9 predniSONE (DELTASONE) 10 mg tablet      triamcinolone acetonide (KENALOG) 0.1 % ointment     Follow-up Disposition:  Return if symptoms worsen or fail to improve. reviewed medications and side effects in detail    - Alarm signals discussed. ER precautions  - Plan of care reviewed with patient. Understanding verbalized and they are in agreement with plan of care.

## 2017-07-06 ENCOUNTER — HOSPITAL ENCOUNTER (EMERGENCY)
Age: 27
Discharge: HOME | End: 2017-07-06
Attending: EMERGENCY MEDICINE
Payer: MEDICAID

## 2017-07-06 ENCOUNTER — APPOINTMENT (OUTPATIENT)
Dept: GENERAL RADIOLOGY | Age: 27
End: 2017-07-06
Attending: PHYSICIAN ASSISTANT
Payer: MEDICAID

## 2017-07-06 VITALS
DIASTOLIC BLOOD PRESSURE: 99 MMHG | OXYGEN SATURATION: 98 % | SYSTOLIC BLOOD PRESSURE: 138 MMHG | BODY MASS INDEX: 40.75 KG/M2 | HEIGHT: 63 IN | RESPIRATION RATE: 16 BRPM | HEART RATE: 95 BPM | WEIGHT: 230 LBS | TEMPERATURE: 98.3 F

## 2017-07-06 DIAGNOSIS — R07.89 ATYPICAL CHEST PAIN: Primary | ICD-10-CM

## 2017-07-06 DIAGNOSIS — R03.0 ELEVATED BLOOD PRESSURE READING: ICD-10-CM

## 2017-07-06 LAB
ALBUMIN SERPL BCP-MCNC: 4.4 G/DL (ref 3.4–5)
ALBUMIN/GLOB SERPL: 1.1 {RATIO} (ref 0.8–1.7)
ALP SERPL-CCNC: 62 U/L (ref 45–117)
ALT SERPL-CCNC: 34 U/L (ref 13–56)
ANION GAP BLD CALC-SCNC: 8 MMOL/L (ref 3–18)
AST SERPL W P-5'-P-CCNC: 16 U/L (ref 15–37)
ATRIAL RATE: 91 BPM
BASOPHILS # BLD AUTO: 0 K/UL (ref 0–0.1)
BASOPHILS # BLD: 0 % (ref 0–2)
BILIRUB DIRECT SERPL-MCNC: <0.1 MG/DL (ref 0–0.2)
BILIRUB SERPL-MCNC: 0.3 MG/DL (ref 0.2–1)
BUN SERPL-MCNC: 9 MG/DL (ref 7–18)
BUN/CREAT SERPL: 13 (ref 12–20)
CALCIUM SERPL-MCNC: 9.7 MG/DL (ref 8.5–10.1)
CALCULATED P AXIS, ECG09: 43 DEGREES
CALCULATED R AXIS, ECG10: 72 DEGREES
CALCULATED T AXIS, ECG11: 9 DEGREES
CHLORIDE SERPL-SCNC: 102 MMOL/L (ref 100–108)
CK MB CFR SERPL CALC: NORMAL % (ref 0–4)
CK MB SERPL-MCNC: <1 NG/ML (ref 5–25)
CK SERPL-CCNC: 90 U/L (ref 26–192)
CO2 SERPL-SCNC: 28 MMOL/L (ref 21–32)
CREAT SERPL-MCNC: 0.71 MG/DL (ref 0.6–1.3)
DIAGNOSIS, 93000: NORMAL
DIFFERENTIAL METHOD BLD: ABNORMAL
EOSINOPHIL # BLD: 0.4 K/UL (ref 0–0.4)
EOSINOPHIL NFR BLD: 4 % (ref 0–5)
ERYTHROCYTE [DISTWIDTH] IN BLOOD BY AUTOMATED COUNT: 12.8 % (ref 11.6–14.5)
GLOBULIN SER CALC-MCNC: 3.9 G/DL (ref 2–4)
GLUCOSE SERPL-MCNC: 111 MG/DL (ref 74–99)
HCT VFR BLD AUTO: 40.9 % (ref 35–45)
HGB BLD-MCNC: 14.2 G/DL (ref 12–16)
LYMPHOCYTES # BLD AUTO: 39 % (ref 21–52)
LYMPHOCYTES # BLD: 3.9 K/UL (ref 0.9–3.6)
MCH RBC QN AUTO: 28.7 PG (ref 24–34)
MCHC RBC AUTO-ENTMCNC: 34.7 G/DL (ref 31–37)
MCV RBC AUTO: 82.8 FL (ref 74–97)
MONOCYTES # BLD: 0.5 K/UL (ref 0.05–1.2)
MONOCYTES NFR BLD AUTO: 5 % (ref 3–10)
NEUTS SEG # BLD: 5.2 K/UL (ref 1.8–8)
NEUTS SEG NFR BLD AUTO: 52 % (ref 40–73)
P-R INTERVAL, ECG05: 136 MS
PLATELET # BLD AUTO: 313 K/UL (ref 135–420)
PMV BLD AUTO: 9.5 FL (ref 9.2–11.8)
POTASSIUM SERPL-SCNC: 4 MMOL/L (ref 3.5–5.5)
PROT SERPL-MCNC: 8.3 G/DL (ref 6.4–8.2)
Q-T INTERVAL, ECG07: 358 MS
QRS DURATION, ECG06: 76 MS
QTC CALCULATION (BEZET), ECG08: 440 MS
RBC # BLD AUTO: 4.94 M/UL (ref 4.2–5.3)
SODIUM SERPL-SCNC: 138 MMOL/L (ref 136–145)
TROPONIN I SERPL-MCNC: <0.02 NG/ML (ref 0–0.04)
VENTRICULAR RATE, ECG03: 91 BPM
WBC # BLD AUTO: 10 K/UL (ref 4.6–13.2)

## 2017-07-06 PROCEDURE — 93005 ELECTROCARDIOGRAM TRACING: CPT

## 2017-07-06 PROCEDURE — 80076 HEPATIC FUNCTION PANEL: CPT | Performed by: PHYSICIAN ASSISTANT

## 2017-07-06 PROCEDURE — 74011000250 HC RX REV CODE- 250: Performed by: PHYSICIAN ASSISTANT

## 2017-07-06 PROCEDURE — 82550 ASSAY OF CK (CPK): CPT | Performed by: PHYSICIAN ASSISTANT

## 2017-07-06 PROCEDURE — 85025 COMPLETE CBC W/AUTO DIFF WBC: CPT | Performed by: PHYSICIAN ASSISTANT

## 2017-07-06 PROCEDURE — 99283 EMERGENCY DEPT VISIT LOW MDM: CPT

## 2017-07-06 PROCEDURE — 80048 BASIC METABOLIC PNL TOTAL CA: CPT | Performed by: PHYSICIAN ASSISTANT

## 2017-07-06 PROCEDURE — 74011250637 HC RX REV CODE- 250/637: Performed by: PHYSICIAN ASSISTANT

## 2017-07-06 PROCEDURE — 71020 XR CHEST PA LAT: CPT

## 2017-07-06 RX ORDER — LIDOCAINE HYDROCHLORIDE 20 MG/ML
15 SOLUTION OROPHARYNGEAL AS NEEDED
Status: DISCONTINUED | OUTPATIENT
Start: 2017-07-06 | End: 2017-07-06 | Stop reason: HOSPADM

## 2017-07-06 RX ORDER — OMEPRAZOLE 20 MG/1
20 CAPSULE, DELAYED RELEASE ORAL DAILY
Qty: 14 CAP | Refills: 0 | Status: SHIPPED | OUTPATIENT
Start: 2017-07-06 | End: 2017-07-20

## 2017-07-06 RX ADMIN — LIDOCAINE HYDROCHLORIDE 15 ML: 20 SOLUTION ORAL; TOPICAL at 02:31

## 2017-07-06 RX ADMIN — PHENOBARBITAL 30 ML: 16.2; .1037; .0065; .0194 ELIXIR ORAL at 02:30

## 2017-07-06 NOTE — DISCHARGE INSTRUCTIONS
Chest Pain: Care Instructions  Your Care Instructions  There are many things that can cause chest pain. Some are not serious and will get better on their own in a few days. But some kinds of chest pain need more testing and treatment. Your doctor may have recommended a follow-up visit in the next 8 to 12 hours. If you are not getting better, you may need more tests or treatment. Even though your doctor has released you, you still need to watch for any problems. The doctor carefully checked you, but sometimes problems can develop later. If you have new symptoms or if your symptoms do not get better, get medical care right away. If you have worse or different chest pain or pressure that lasts more than 5 minutes or you passed out (lost consciousness), call 911 or seek other emergency help right away. A medical visit is only one step in your treatment. Even if you feel better, you still need to do what your doctor recommends, such as going to all suggested follow-up appointments and taking medicines exactly as directed. This will help you recover and help prevent future problems. How can you care for yourself at home? · Rest until you feel better. · Take your medicine exactly as prescribed. Call your doctor if you think you are having a problem with your medicine. · Do not drive after taking a prescription pain medicine. When should you call for help? Call 911 if:  · You passed out (lost consciousness). · You have severe difficulty breathing. · You have symptoms of a heart attack. These may include:  ¨ Chest pain or pressure, or a strange feeling in your chest.  ¨ Sweating. ¨ Shortness of breath. ¨ Nausea or vomiting. ¨ Pain, pressure, or a strange feeling in your back, neck, jaw, or upper belly or in one or both shoulders or arms. ¨ Lightheadedness or sudden weakness. ¨ A fast or irregular heartbeat.   After you call 911, the  may tell you to chew 1 adult-strength or 2 to 4 low-dose aspirin. Wait for an ambulance. Do not try to drive yourself. Call your doctor today if:  · You have any trouble breathing. · Your chest pain gets worse. · You are dizzy or lightheaded, or you feel like you may faint. · You are not getting better as expected. · You are having new or different chest pain. Where can you learn more? Go to http://matt-rashida.info/. Enter A120 in the search box to learn more about \"Chest Pain: Care Instructions. \"  Current as of: March 20, 2017  Content Version: 11.3  © 8637-8594 [employer]. Care instructions adapted under license by [employer] (which disclaims liability or warranty for this information). If you have questions about a medical condition or this instruction, always ask your healthcare professional. [address] any warranty or liability for your use of this information.

## 2017-07-06 NOTE — ED PROVIDER NOTES
HPI Comments: Yisel Bullock is a 31 y.o. female with a pertinent history of reflux who presents to the emergency department for evaluation of midline chest pain intermittent x 3 days, but constant x 18 hours. She relates pain is worse when she lies down. She states she has had a dry cough. Symptoms are similar to previous episodes of indigestion, but it has never been this severe. She denies any recent changes in diet or activity. Pain is non-radiating. No associated SOB or diaphoresis. Pt denies any recent fevers or chills, headache, dizziness or light headedness, ENT issues, n/v/d/c, abd pain, back pain, dysuria, hematuria, frequency, focal weakness/numbness/tingling, or rash. Patient has no other complaints at this time. PCP:  Jean Ibarra MD        Patient is a 31 y.o. female presenting with chest pain. Chest Pain (Angina)    Associated symptoms include cough. Pertinent negatives include no abdominal pain, no back pain, no dizziness, no fever, no headaches, no nausea, no shortness of breath and no vomiting. Past Medical History:   Diagnosis Date    Gestational diabetes        Past Surgical History:   Procedure Laterality Date    HX  SECTION  , ,          Family History:   Problem Relation Age of Onset    Cancer Paternal Grandfather      lung- smoker    Cancer Paternal Grandmother      unknown    Cancer Mother      breast- unknown age       Social History     Social History    Marital status: SINGLE     Spouse name: N/A    Number of children: N/A    Years of education: N/A     Occupational History    Not on file.      Social History Main Topics    Smoking status: Never Smoker    Smokeless tobacco: Never Used    Alcohol use No      Comment: rarely    Drug use: No    Sexual activity: Yes     Partners: Male     Birth control/ protection: Condom     Other Topics Concern    Not on file     Social History Narrative         ALLERGIES: Vicodin [hydrocodone-acetaminophen]    Review of Systems   Constitutional: Negative for chills and fever. HENT: Negative for congestion, rhinorrhea and sore throat. Respiratory: Positive for cough. Negative for shortness of breath. Cardiovascular: Positive for chest pain. Negative for leg swelling. Gastrointestinal: Negative for abdominal pain, constipation, diarrhea, nausea and vomiting. Genitourinary: Negative for dysuria, frequency and hematuria. Musculoskeletal: Negative for back pain and myalgias. Skin: Negative for rash and wound. Neurological: Negative for dizziness and headaches. Vitals:    07/06/17 0030   BP: (!) 138/99   Pulse: 95   Resp: 16   Temp: 98.3 °F (36.8 °C)   SpO2: 98%   Weight: 104.3 kg (230 lb)   Height: 5' 3\" (1.6 m)            Physical Exam   Constitutional: She is oriented to person, place, and time. She appears well-developed and well-nourished. No distress. HENT:   Head: Normocephalic and atraumatic. Eyes: Conjunctivae are normal. Right eye exhibits no discharge. Left eye exhibits no discharge. Neck: Normal range of motion. Neck supple. No thyromegaly present. Cardiovascular: Normal rate, regular rhythm and normal heart sounds. Pulmonary/Chest: Effort normal and breath sounds normal. No respiratory distress. She has no wheezes. She has no rales. She exhibits no tenderness. Lungs CTAB   Musculoskeletal: She exhibits no edema or deformity. Lymphadenopathy:     She has no cervical adenopathy. Neurological: She is alert and oriented to person, place, and time. She has normal reflexes. Skin: Skin is warm and dry. No rash noted. She is not diaphoretic. Psychiatric: She has a normal mood and affect. Nursing note and vitals reviewed.        MDM  Number of Diagnoses or Management Options  Atypical chest pain: new and requires workup  Elevated blood pressure reading: new and requires workup  Diagnosis management comments: Differential Diagnosis: Pneumonia, pulmonary embolism, chest wall pain, angina/MI, pleurisy, pericarditis, myopericarditis, herpes zoster, aortic dissection, Prinzmetal angina, acute pericardial tamponade, GERD, PUD, esophageal motility disorders      Plan: Pt presents ambulatory in NAD, well-hydrated, non-toxic in appearance, and afebrile with elevated BP and otherwise normal vitals. HPI consistent with GERD. Exam reveals nothing abnormal.  EKG shows NSR. Troponin wnl. Otherwise unremarkable labs. CXR shows nothing acute. Heart score is 1 - low risk. At this time, patient is stable and appropriate for discharge home. Patient demonstrates understanding of current diagnoses and is in agreement with the treatment plan. They are advised that while the likelihood of serious underlying condition is low at this point given the evaluation performed today, we cannot fully rule it out. They are advised to immediately return with any new symptoms or worsening of current condition. All questions have been answered. Patient is given educational material regarding their diagnoses, including danger symptoms and when to return to the ED.   Follow-up with PCP       Amount and/or Complexity of Data Reviewed  Clinical lab tests: ordered and reviewed  Tests in the radiology section of CPT®: ordered and reviewed  Tests in the medicine section of CPT®: ordered and reviewed  Review and summarize past medical records: yes    Risk of Complications, Morbidity, and/or Mortality  Presenting problems: moderate  Diagnostic procedures: moderate  Management options: moderate    Patient Progress  Patient progress: improved    ED Course       Procedures           -------------------------------------------------------------------------------------------------------------------  Orders:  Orders Placed This Encounter    XR CHEST PA LAT     Standing Status:   Standing     Number of Occurrences:   1     Order Specific Question:   Transport     Answer:   Ambulatory [1] Order Specific Question:   Reason for Exam     Answer:   chest pain     Order Specific Question:   Is Patient Pregnant? Answer:   No    CBC WITH AUTOMATED DIFF     Standing Status:   Standing     Number of Occurrences:   1    HEPATIC FUNCTION PANEL     Standing Status:   Standing     Number of Occurrences:   1    METABOLIC PANEL, BASIC     Standing Status:   Standing     Number of Occurrences:   1    CARDIAC PANEL,(CK, CKMB & TROPONIN)     Standing Status:   Standing     Number of Occurrences:   1    EKG, 12 LEAD, INITIAL     Standing Status:   Standing     Number of Occurrences:   1     Order Specific Question:   Reason for Exam:     Answer:   chest pain    SALINE LOCK IV ONE TIME STAT     Standing Status:   Standing     Number of Occurrences:   1    lidocaine (XYLOCAINE) 2 % viscous solution 15 mL    maalox/donnatal (GI COCKTAIL COMPOUND) oral liquid    omeprazole (PRILOSEC) 20 mg capsule     Sig: Take 1 Cap by mouth daily for 14 days. Dispense:  14 Cap     Refill:  0        Lab Results:   Recent Results (from the past 12 hour(s))   EKG, 12 LEAD, INITIAL    Collection Time: 07/06/17 12:37 AM   Result Value Ref Range    Ventricular Rate 91 BPM    Atrial Rate 91 BPM    P-R Interval 136 ms    QRS Duration 76 ms    Q-T Interval 358 ms    QTC Calculation (Bezet) 440 ms    Calculated P Axis 43 degrees    Calculated R Axis 72 degrees    Calculated T Axis 9 degrees    Diagnosis       Normal sinus rhythm  Normal ECG  When compared with ECG of 25-FEB-2017 16:51,  Vent.  rate has decreased BY  74 BPM  QRS axis shifted left     CBC WITH AUTOMATED DIFF    Collection Time: 07/06/17  2:55 AM   Result Value Ref Range    WBC 10.0 4.6 - 13.2 K/uL    RBC 4.94 4.20 - 5.30 M/uL    HGB 14.2 12.0 - 16.0 g/dL    HCT 40.9 35.0 - 45.0 %    MCV 82.8 74.0 - 97.0 FL    MCH 28.7 24.0 - 34.0 PG    MCHC 34.7 31.0 - 37.0 g/dL    RDW 12.8 11.6 - 14.5 %    PLATELET 063 729 - 467 K/uL    MPV 9.5 9.2 - 11.8 FL    NEUTROPHILS 52 40 - 73 %    LYMPHOCYTES 39 21 - 52 %    MONOCYTES 5 3 - 10 %    EOSINOPHILS 4 0 - 5 %    BASOPHILS 0 0 - 2 %    ABS. NEUTROPHILS 5.2 1.8 - 8.0 K/UL    ABS. LYMPHOCYTES 3.9 (H) 0.9 - 3.6 K/UL    ABS. MONOCYTES 0.5 0.05 - 1.2 K/UL    ABS. EOSINOPHILS 0.4 0.0 - 0.4 K/UL    ABS. BASOPHILS 0.0 0.0 - 0.1 K/UL    DF AUTOMATED     HEPATIC FUNCTION PANEL    Collection Time: 07/06/17  2:55 AM   Result Value Ref Range    Protein, total 8.3 (H) 6.4 - 8.2 g/dL    Albumin 4.4 3.4 - 5.0 g/dL    Globulin 3.9 2.0 - 4.0 g/dL    A-G Ratio 1.1 0.8 - 1.7      Bilirubin, total 0.3 0.2 - 1.0 MG/DL    Bilirubin, direct <0.1 0.0 - 0.2 MG/DL    Alk. phosphatase 62 45 - 117 U/L    AST (SGOT) 16 15 - 37 U/L    ALT (SGPT) 34 13 - 56 U/L   METABOLIC PANEL, BASIC    Collection Time: 07/06/17  2:55 AM   Result Value Ref Range    Sodium 138 136 - 145 mmol/L    Potassium 4.0 3.5 - 5.5 mmol/L    Chloride 102 100 - 108 mmol/L    CO2 28 21 - 32 mmol/L    Anion gap 8 3.0 - 18 mmol/L    Glucose 111 (H) 74 - 99 mg/dL    BUN 9 7.0 - 18 MG/DL    Creatinine 0.71 0.6 - 1.3 MG/DL    BUN/Creatinine ratio 13 12 - 20      GFR est AA >60 >60 ml/min/1.73m2    GFR est non-AA >60 >60 ml/min/1.73m2    Calcium 9.7 8.5 - 10.1 MG/DL   CARDIAC PANEL,(CK, CKMB & TROPONIN)    Collection Time: 07/06/17  2:55 AM   Result Value Ref Range    CK 90 26 - 192 U/L    CK - MB <1.0 <3.6 ng/ml    CK-MB Index  0.0 - 4.0 %     CALCULATION NOT PERFORMED WHEN RESULT IS BELOW LINEAR LIMIT    Troponin-I, Qt. <0.02 0.0 - 0.045 NG/ML       Radiology Results:  No results found. Progress Notes:  2:06 AM:  Nestor Arce PA-C was at the pt's bedside, assessed the pt and answered the pt's questions regarding treatment.    -------------------------------------------------------------------------------------------------------------------    Disposition:  Diagnosis:   1. Atypical chest pain    2.  Elevated blood pressure reading        Disposition: NM Home    Follow-up Information     Follow up With Details Comments Contact Info    Pan Reyes MD Call in 1 day For follow-up [address] [place]      [facility] EMERGENCY DEPT Go to As needed, If symptoms worsen [address] [address]  [phone]          Patient's Medications   Start Taking    OMEPRAZOLE (PRILOSEC) 20 MG CAPSULE    Take 1 Cap by mouth daily for 14 days. Continue Taking    ACETAMINOPHEN (TYLENOL ARTHRITIS PAIN) 650 MG CR TABLET    Take 650 mg by mouth every six (6) hours as needed for Pain. ALBUTEROL (PROVENTIL HFA, VENTOLIN HFA, PROAIR HFA) 90 MCG/ACTUATION INHALER    Take 1-2 Puffs by inhalation every four (4) hours as needed for Wheezing. DIPHENHYDRAMINE (BENADRYL) 25 MG CAPSULE    Take 50 mg by mouth two (2) times a day. FLUTICASONE (FLONASE) 50 MCG/ACTUATION NASAL SPRAY    2 Sprays by Both Nostrils route daily. Administer to right and left nostrils. IBUPROFEN (MOTRIN) 600 MG TABLET    Take 1 Tab by mouth every eight (8) hours as needed for Pain. PREDNISONE (DELTASONE) 10 MG TABLET    4 tabs for 5 days    TRIAMCINOLONE ACETONIDE (KENALOG) 0.1 % OINTMENT    Apply  to affected area two (2) times a day.  use thin layer   These Medications have changed    No medications on file   Stop Taking    No medications on file

## 2017-07-06 NOTE — ED NOTES
Bedside and Verbal shift change report [facility] (oncoming nurse) by Gemini Thrasher RN (offgoing nurse). Report included the following information SBAR, Kardex and ED Summary.

## 2018-10-27 ENCOUNTER — HOSPITAL ENCOUNTER (EMERGENCY)
Age: 28
Discharge: HOME | End: 2018-10-27
Attending: EMERGENCY MEDICINE
Payer: MEDICAID

## 2018-10-27 VITALS
RESPIRATION RATE: 16 BRPM | HEART RATE: 99 BPM | HEIGHT: 63 IN | TEMPERATURE: 98 F | DIASTOLIC BLOOD PRESSURE: 83 MMHG | SYSTOLIC BLOOD PRESSURE: 121 MMHG | OXYGEN SATURATION: 100 % | WEIGHT: 193 LBS | BODY MASS INDEX: 34.2 KG/M2

## 2018-10-27 DIAGNOSIS — N76.0 BV (BACTERIAL VAGINOSIS): ICD-10-CM

## 2018-10-27 DIAGNOSIS — B96.89 BV (BACTERIAL VAGINOSIS): ICD-10-CM

## 2018-10-27 DIAGNOSIS — N39.0 ACUTE UTI: Primary | ICD-10-CM

## 2018-10-27 LAB
APPEARANCE UR: ABNORMAL
BACTERIA URNS QL MICRO: ABNORMAL /HPF
BILIRUB UR QL: NEGATIVE
CAOX CRY URNS QL MICRO: ABNORMAL
COLOR UR: ABNORMAL
EPITH CASTS URNS QL MICRO: ABNORMAL /LPF (ref 0–5)
GLUCOSE UR STRIP.AUTO-MCNC: NEGATIVE MG/DL
HCG UR QL: NEGATIVE
HGB UR QL STRIP: ABNORMAL
KETONES UR QL STRIP.AUTO: ABNORMAL MG/DL
LEUKOCYTE ESTERASE UR QL STRIP.AUTO: ABNORMAL
MUCOUS THREADS URNS QL MICRO: ABNORMAL /LPF
NITRITE UR QL STRIP.AUTO: NEGATIVE
PH UR STRIP: 6 [PH] (ref 5–8)
PROT UR STRIP-MCNC: ABNORMAL MG/DL
RBC #/AREA URNS HPF: ABNORMAL /HPF (ref 0–5)
SERVICE CMNT-IMP: NORMAL
SP GR UR REFRACTOMETRY: 1.03 (ref 1–1.03)
UROBILINOGEN UR QL STRIP.AUTO: 1 EU/DL (ref 0.2–1)
WBC URNS QL MICRO: ABNORMAL /HPF (ref 0–4)
WET PREP GENITAL: NORMAL

## 2018-10-27 PROCEDURE — 96372 THER/PROPH/DIAG INJ SC/IM: CPT

## 2018-10-27 PROCEDURE — 87210 SMEAR WET MOUNT SALINE/INK: CPT | Performed by: EMERGENCY MEDICINE

## 2018-10-27 PROCEDURE — 74011250636 HC RX REV CODE- 250/636: Performed by: EMERGENCY MEDICINE

## 2018-10-27 PROCEDURE — 99284 EMERGENCY DEPT VISIT MOD MDM: CPT

## 2018-10-27 PROCEDURE — 81025 URINE PREGNANCY TEST: CPT | Performed by: EMERGENCY MEDICINE

## 2018-10-27 PROCEDURE — 74011250637 HC RX REV CODE- 250/637: Performed by: EMERGENCY MEDICINE

## 2018-10-27 PROCEDURE — 87491 CHLMYD TRACH DNA AMP PROBE: CPT | Performed by: EMERGENCY MEDICINE

## 2018-10-27 PROCEDURE — 81001 URINALYSIS AUTO W/SCOPE: CPT | Performed by: EMERGENCY MEDICINE

## 2018-10-27 RX ORDER — METRONIDAZOLE 500 MG/1
500 TABLET ORAL 2 TIMES DAILY
Qty: 14 TAB | Refills: 0 | Status: SHIPPED | OUTPATIENT
Start: 2018-10-27 | End: 2018-11-03

## 2018-10-27 RX ORDER — AZITHROMYCIN 250 MG/1
1000 TABLET, FILM COATED ORAL
Status: COMPLETED | OUTPATIENT
Start: 2018-10-27 | End: 2018-10-27

## 2018-10-27 RX ORDER — CEPHALEXIN 500 MG/1
500 CAPSULE ORAL 4 TIMES DAILY
Qty: 28 CAP | Refills: 0 | Status: SHIPPED | OUTPATIENT
Start: 2018-10-27 | End: 2018-11-03

## 2018-10-27 RX ADMIN — LIDOCAINE HYDROCHLORIDE 250 MG: 10 INJECTION, SOLUTION EPIDURAL; INFILTRATION; INTRACAUDAL; PERINEURAL at 09:12

## 2018-10-27 RX ADMIN — AZITHROMYCIN 1000 MG: 250 TABLET, FILM COATED ORAL at 09:12

## 2018-10-27 NOTE — DISCHARGE INSTRUCTIONS
Bacterial Vaginosis: Care Instructions  Your Care Instructions    Bacterial vaginosis is a type of vaginal infection. It is caused by excess growth of certain bacteria that are normally found in the vagina. Symptoms can include itching, swelling, pain when you urinate or have sex, and a gray or yellow discharge with a \"fishy\" odor. It is not considered an infection that is spread through sexual contact. Although symptoms can be annoying and uncomfortable, bacterial vaginosis does not usually cause other health problems. However, if you have it while you are pregnant, it can cause complications. While the infection may go away on its own, most doctors use antibiotics to treat it. You may have been prescribed pills or vaginal cream. With treatment, bacterial vaginosis usually clears up in 5 to 7 days. Follow-up care is a key part of your treatment and safety. Be sure to make and go to all appointments, and call your doctor if you are having problems. It's also a good idea to know your test results and keep a list of the medicines you take. How can you care for yourself at home? · Take your antibiotics as directed. Do not stop taking them just because you feel better. You need to take the full course of antibiotics. · Do not eat or drink anything that contains alcohol if you are taking metronidazole (Flagyl). · Keep using your medicine if you start your period. Use pads instead of tampons while using a vaginal cream or suppository. Tampons can absorb the medicine. · Wear loose cotton clothing. Do not wear nylon and other materials that hold body heat and moisture close to the skin. · Do not scratch. Relieve itching with a cold pack or a cool bath. · Do not wash your vaginal area more than once a day. Use plain water or a mild, unscented soap. Do not douche. When should you call for help?   Watch closely for changes in your health, and be sure to contact your doctor if:    · You have unexpected vaginal bleeding.     · You have a fever.     · You have new or increased pain in your vagina or pelvis.     · You are not getting better after 1 week.     · Your symptoms return after you finish the course of your medicine. Where can you learn more? Go to http://matt-rashida.info/. Raz Trejo in the search box to learn more about \"Bacterial Vaginosis: Care Instructions. \"  Current as of: May 15, 2018  Content Version: 11.8  © 5026-9958 [employer]. Care instructions adapted under license by [employer] (which disclaims liability or warranty for this information). If you have questions about a medical condition or this instruction, always ask your healthcare professional. [place] [address] any warranty or liability for your use of this information. Urinary Tract Infection in Women: Care Instructions  Your Care Instructions    A urinary tract infection, or UTI, is a general term for an infection anywhere between the kidneys and the urethra (where urine comes out). Most UTIs are bladder infections. They often cause pain or burning when you urinate. UTIs are caused by bacteria and can be cured with antibiotics. Be sure to complete your treatment so that the infection goes away. Follow-up care is a key part of your treatment and safety. Be sure to make and go to all appointments, and call your doctor if you are having problems. It's also a good idea to know your test results and keep a list of the medicines you take. How can you care for yourself at home? · Take your antibiotics as directed. Do not stop taking them just because you feel better. You need to take the full course of antibiotics. · Drink extra water and other fluids for the next day or two. This may help wash out the bacteria that are causing the infection.  (If you have kidney, heart, or liver disease and have to limit fluids, talk with your doctor before you increase your fluid intake.)  · Avoid drinks that are carbonated or have caffeine. They can irritate the bladder. · Urinate often. Try to empty your bladder each time. · To relieve pain, take a hot bath or lay a heating pad set on low over your lower belly or genital area. Never go to sleep with a heating pad in place. To prevent UTIs  · Drink plenty of water each day. This helps you urinate often, which clears bacteria from your system. (If you have kidney, heart, or liver disease and have to limit fluids, talk with your doctor before you increase your fluid intake.)  · Urinate when you need to. · Urinate right after you have sex. · Change sanitary pads often. · Avoid douches, bubble baths, feminine hygiene sprays, and other feminine hygiene products that have deodorants. · After going to the bathroom, wipe from front to back. When should you call for help? Call your doctor now or seek immediate medical care if:    · Symptoms such as fever, chills, nausea, or vomiting get worse or appear for the first time.     · You have new pain in your back just below your rib cage. This is called flank pain.     · There is new blood or pus in your urine.     · You have any problems with your antibiotic medicine.    Watch closely for changes in your health, and be sure to contact your doctor if:    · You are not getting better after taking an antibiotic for 2 days.     · Your symptoms go away but then come back. Where can you learn more? Go to http://matt-rashida.info/. Enter C171 in the search box to learn more about \"Urinary Tract Infection in Women: Care Instructions. \"  Current as of: March 21, 2018  Content Version: 11.8  © 0806-9386 [employer]. Care instructions adapted under license by [employer] (which disclaims liability or warranty for this information).  If you have questions about a medical condition or this instruction, always ask your healthcare professional. [employer] disclaims any warranty or liability for your use of this information.

## 2018-10-27 NOTE — ED PROVIDER NOTES
EMERGENCY DEPARTMENT HISTORY AND PHYSICAL EXAM 
 
Date: 10/27/2018 Patient Name: Da Baxter History of Presenting Illness Chief Complaint Patient presents with  Vaginal Discharge History Provided By: Patient Chief Complaint: vaginal discharge Duration: 1 Weeks Timing:  Acute Location: vaginal 
Quality: Cramping Severity: Moderate Modifying Factors: found out partner w/multiple other women Associated Symptoms: cramping pelvic pain Additional History (Context): Da Baxter is a 33 y.o. female with obesity who presents with vaginal discharge x 1 week and cramping pelvic discomfort x 2 weeks. Found out recently that her partner of multiple years apparently w/other women. Denies fever, dysuria. PCP: Caitie Armendariz NP Current Facility-Administered Medications Medication Dose Route Frequency Provider Last Rate Last Dose  cefTRIAXone (ROCEPHIN) 250 mg in lidocaine (PF) (XYLOCAINE) 10 mg/mL (1 %) IM injection  250 mg IntraMUSCular ONCE Gisele Malin PA      
 azithromycin (ZITHROMAX) tablet 1,000 mg  1,000 mg Oral NOW Gisele Malin PA Current Outpatient Medications Medication Sig Dispense Refill  cephALEXin (KEFLEX) 500 mg capsule Take 1 Cap by mouth four (4) times daily for 7 days. 28 Cap 0  
 metroNIDAZOLE (FLAGYL) 500 mg tablet Take 1 Tab by mouth two (2) times a day for 7 days. 14 Tab 0  predniSONE (DELTASONE) 10 mg tablet 4 tabs for 5 days 20 Tab 0  
 triamcinolone acetonide (KENALOG) 0.1 % ointment Apply  to affected area two (2) times a day. use thin layer 30 g 0  
 diphenhydrAMINE (BENADRYL) 25 mg capsule Take 50 mg by mouth two (2) times a day.  acetaminophen (TYLENOL ARTHRITIS PAIN) 650 mg CR tablet Take 650 mg by mouth every six (6) hours as needed for Pain.  ibuprofen (MOTRIN) 600 mg tablet Take 1 Tab by mouth every eight (8) hours as needed for Pain.  21 Tab 0  
  fluticasone (FLONASE) 50 mcg/actuation nasal spray 2 Sprays by Both Nostrils route daily. Administer to right and left nostrils. 1 Bottle 6  
 albuterol (PROVENTIL HFA, VENTOLIN HFA, PROAIR HFA) 90 mcg/actuation inhaler Take 1-2 Puffs by inhalation every four (4) hours as needed for Wheezing. 1 Inhaler 6 Past History Past Medical History: 
Past Medical History:  
Diagnosis Date  Gestational diabetes Past Surgical History: 
Past Surgical History:  
Procedure Laterality Date  HX  SECTION  , ,  Family History: 
Family History Problem Relation Age of Onset  Cancer Paternal Grandfather   
     lung- smoker  Cancer Paternal Grandmother   
     unknown  Cancer Mother   
     breast- unknown age Social History: 
Social History Tobacco Use  Smoking status: Never Smoker  Smokeless tobacco: Never Used Substance Use Topics  Alcohol use: No  
  Comment: rarely  Drug use: No  
 
 
Allergies: Allergies Allergen Reactions  Vicodin [Hydrocodone-Acetaminophen] Anxiety Review of Systems Review of Systems Constitutional: Negative for fever. Genitourinary: Positive for pelvic pain and vaginal discharge. Negative for dysuria, flank pain, frequency and vaginal bleeding. All other systems reviewed and are negative. All Other Systems Negative Physical Exam  
 
Vitals:  
 10/27/18 3347 BP: (!) 134/92 Pulse: (!) 108 Resp: 16 Temp: 98 °F (36.7 °C) SpO2: 98% Weight: 87.5 kg (193 lb) Height: 5' 3\" (1.6 m) Physical Exam  
Constitutional: She is oriented to person, place, and time. She appears well-developed. HENT:  
Head: Normocephalic and atraumatic. Eyes: Pupils are equal, round, and reactive to light. Neck: No JVD present. No tracheal deviation present. No thyromegaly present. Cardiovascular: Normal rate, regular rhythm and normal heart sounds. Exam reveals no gallop and no friction rub. No murmur heard. Pulmonary/Chest: Effort normal and breath sounds normal. No stridor. No respiratory distress. She has no wheezes. She has no rales. She exhibits no tenderness. Abdominal: Soft. She exhibits no distension and no mass. There is no tenderness. There is no rebound and no guarding. Genitourinary: Vaginal discharge found. Musculoskeletal: She exhibits no edema or tenderness. Lymphadenopathy:  
  She has no cervical adenopathy. Neurological: She is alert and oriented to person, place, and time. Skin: Skin is warm and dry. No rash noted. No erythema. No pallor. Psychiatric: She has a normal mood and affect. Her behavior is normal. Thought content normal.  
Nursing note and vitals reviewed. Diagnostic Study Results Labs - Recent Results (from the past 12 hour(s)) URINALYSIS W/ RFLX MICROSCOPIC Collection Time: 10/27/18  8:13 AM  
Result Value Ref Range Color DARK YELLOW Appearance CLOUDY Specific gravity 1.030 1.005 - 1.030    
 pH (UA) 6.0 5.0 - 8.0 Protein TRACE (A) NEG mg/dL Glucose NEGATIVE  NEG mg/dL Ketone TRACE (A) NEG mg/dL Bilirubin NEGATIVE  NEG Blood TRACE (A) NEG Urobilinogen 1.0 0.2 - 1.0 EU/dL Nitrites NEGATIVE  NEG Leukocyte Esterase SMALL (A) NEG    
HCG URINE, QL Collection Time: 10/27/18  8:13 AM  
Result Value Ref Range HCG urine, QL NEGATIVE  NEG    
URINE MICROSCOPIC ONLY Collection Time: 10/27/18  8:13 AM  
Result Value Ref Range WBC 1 to 4 0 - 4 /hpf  
 RBC 0 to 3 0 - 5 /hpf Epithelial cells 1+ 0 - 5 /lpf Bacteria 1+ (A) NEG /hpf Mucus 1+ (A) NEG /lpf  
 CA Oxalate crystals 2+ (A) NEG  
WET PREP Collection Time: 10/27/18  8:15 AM  
Result Value Ref Range Special Requests: NO SPECIAL REQUESTS Wet prep MODERATE 
CLUE CELLS PRESENT Wet prep NO TRICHOMONAS SEEN Wet prep NO YEAST SEEN Radiologic Studies - No orders to display CT Results  (Last 48 hours) None CXR Results  (Last 48 hours) None Medical Decision Making I am the first provider for this patient. I reviewed the vital signs, available nursing notes, past medical history, past surgical history, family history and social history. Vital Signs-Reviewed the patient's vital signs. Records Reviewed: Nursing Notes Procedures: 
Pelvic Exam 
Date/Time: 10/27/2018 8:38 AM 
Performed by: PA 
Procedure duration:  3 minutes. Type of exam performed: speculum. External genitalia appearance: normal.   
Vaginal exam:  discharge. The amount of discharge was:  mild. The discharge was thin and milky. Cervical exam:  normal, os closed and no cervical motion tenderness. Specimen(s) collected:  chlamydia, GC and vaginal culture. Bimanual exam:  normal.   
Patient tolerance: Patient tolerated the procedure well with no immediate complications Provider Notes (Medical Decision Making): treat UTI and BV. Prophylaxis for G&C given. MED RECONCILIATION: 
Current Facility-Administered Medications Medication Dose Route Frequency  cefTRIAXone (ROCEPHIN) 250 mg in lidocaine (PF) (XYLOCAINE) 10 mg/mL (1 %) IM injection  250 mg IntraMUSCular ONCE  
 azithromycin (ZITHROMAX) tablet 1,000 mg  1,000 mg Oral NOW Current Outpatient Medications Medication Sig  cephALEXin (KEFLEX) 500 mg capsule Take 1 Cap by mouth four (4) times daily for 7 days.  metroNIDAZOLE (FLAGYL) 500 mg tablet Take 1 Tab by mouth two (2) times a day for 7 days.  predniSONE (DELTASONE) 10 mg tablet 4 tabs for 5 days  triamcinolone acetonide (KENALOG) 0.1 % ointment Apply  to affected area two (2) times a day. use thin layer  diphenhydrAMINE (BENADRYL) 25 mg capsule Take 50 mg by mouth two (2) times a day.  acetaminophen (TYLENOL ARTHRITIS PAIN) 650 mg CR tablet Take 650 mg by mouth every six (6) hours as needed for Pain.   
 ibuprofen (MOTRIN) 600 mg tablet Take 1 Tab by mouth every eight (8) hours as needed for Pain.  fluticasone (FLONASE) 50 mcg/actuation nasal spray 2 Sprays by Both Nostrils route daily. Administer to right and left nostrils.  albuterol (PROVENTIL HFA, VENTOLIN HFA, PROAIR HFA) 90 mcg/actuation inhaler Take 1-2 Puffs by inhalation every four (4) hours as needed for Wheezing. Disposition: 
home DISCHARGE NOTE:  
9:02 AM 
 
Pt has been reexamined. Patient has no new complaints, changes, or physical findings. Care plan outlined and precautions discussed. Results of labs were reviewed with the patient. All medications were reviewed with the patient; will d/c home with flagyl, keflex. All of pt's questions and concerns were addressed. Patient was instructed and agrees to follow up with PCP, as well as to return to the ED upon further deterioration. Patient is ready to go home. Follow-up Information Follow up With Specialties Details Why Contact Info Kerwin Gregory, YOEL Nurse Practitioner Schedule an appointment as soon as possible for a visit in 2 days  [address] [place], [place] [id number] [phone] Chris Chavez [address]  Schedule an appointment as soon as possible for a visit in 3 days  [phone] [facility] EMERGENCY DEPT Emergency Medicine  If symptoms worsen return immediately [address] Current Discharge Medication List  
  
START taking these medications Details  
cephALEXin (KEFLEX) 500 mg capsule Take 1 Cap by mouth four (4) times daily for 7 days. Qty: 28 Cap, Refills: 0  
  
metroNIDAZOLE (FLAGYL) 500 mg tablet Take 1 Tab by mouth two (2) times a day for 7 days. Qty: 14 Tab, Refills: 0 Core Measures: 
 
 
Diagnosis Clinical Impression: 1. Acute UTI 2. BV (bacterial vaginosis)

## 2018-10-27 NOTE — ED TRIAGE NOTES
Patient states she is having \"really bad cramps and discharge for the past 2 weeks. \" She states that she just found out her partner has been with multiple woman.

## 2018-10-27 NOTE — ED NOTES
Patient sitting in room alert and oriented waiting test results. No signs of distress noted at this time will continue to monitor.

## 2018-10-30 LAB
C TRACH RRNA SPEC QL NAA+PROBE: POSITIVE
N GONORRHOEA RRNA SPEC QL NAA+PROBE: NEGATIVE
SPECIMEN SOURCE: ABNORMAL

## 2019-03-06 ENCOUNTER — HOSPITAL ENCOUNTER (EMERGENCY)
Age: 29
Discharge: HOME | End: 2019-03-07
Attending: EMERGENCY MEDICINE
Payer: MEDICAID

## 2019-03-06 DIAGNOSIS — Z34.91 NORMAL INTRAUTERINE PREGNANCY ON PRENATAL ULTRASOUND IN FIRST TRIMESTER: ICD-10-CM

## 2019-03-06 DIAGNOSIS — Z32.01 PREGNANCY TEST PERFORMED, PREGNANCY CONFIRMED: Primary | ICD-10-CM

## 2019-03-06 LAB
ALBUMIN SERPL-MCNC: 4.2 G/DL (ref 3.4–5)
ALBUMIN/GLOB SERPL: 1 {RATIO} (ref 0.8–1.7)
ALP SERPL-CCNC: 60 U/L (ref 45–117)
ALT SERPL-CCNC: 22 U/L (ref 13–56)
ANION GAP SERPL CALC-SCNC: 5 MMOL/L (ref 3–18)
APPEARANCE UR: CLEAR
AST SERPL-CCNC: 11 U/L (ref 15–37)
BASOPHILS # BLD: 0 K/UL (ref 0–0.1)
BASOPHILS NFR BLD: 0 % (ref 0–2)
BILIRUB SERPL-MCNC: 0.4 MG/DL (ref 0.2–1)
BILIRUB UR QL: NEGATIVE
BUN SERPL-MCNC: 10 MG/DL (ref 7–18)
BUN/CREAT SERPL: 16 (ref 12–20)
CALCIUM SERPL-MCNC: 9.1 MG/DL (ref 8.5–10.1)
CHLORIDE SERPL-SCNC: 103 MMOL/L (ref 100–108)
CO2 SERPL-SCNC: 30 MMOL/L (ref 21–32)
COLOR UR: YELLOW
CREAT SERPL-MCNC: 0.64 MG/DL (ref 0.6–1.3)
DIFFERENTIAL METHOD BLD: NORMAL
EOSINOPHIL # BLD: 0.1 K/UL (ref 0–0.4)
EOSINOPHIL NFR BLD: 1 % (ref 0–5)
ERYTHROCYTE [DISTWIDTH] IN BLOOD BY AUTOMATED COUNT: 13.2 % (ref 11.6–14.5)
GLOBULIN SER CALC-MCNC: 4.2 G/DL (ref 2–4)
GLUCOSE SERPL-MCNC: 83 MG/DL (ref 74–99)
GLUCOSE UR STRIP.AUTO-MCNC: NEGATIVE MG/DL
HCG SERPL-ACNC: ABNORMAL MIU/ML (ref 0–10)
HCG UR QL: POSITIVE
HCT VFR BLD AUTO: 38.5 % (ref 35–45)
HGB BLD-MCNC: 13.5 G/DL (ref 12–16)
HGB UR QL STRIP: NEGATIVE
KETONES UR QL STRIP.AUTO: NEGATIVE MG/DL
LEUKOCYTE ESTERASE UR QL STRIP.AUTO: NEGATIVE
LIPASE SERPL-CCNC: 135 U/L (ref 73–393)
LYMPHOCYTES # BLD: 3.3 K/UL (ref 0.9–3.6)
LYMPHOCYTES NFR BLD: 31 % (ref 21–52)
MCH RBC QN AUTO: 29.2 PG (ref 24–34)
MCHC RBC AUTO-ENTMCNC: 35.1 G/DL (ref 31–37)
MCV RBC AUTO: 83.2 FL (ref 74–97)
MONOCYTES # BLD: 0.5 K/UL (ref 0.05–1.2)
MONOCYTES NFR BLD: 5 % (ref 3–10)
NEUTS SEG # BLD: 6.6 K/UL (ref 1.8–8)
NEUTS SEG NFR BLD: 63 % (ref 40–73)
NITRITE UR QL STRIP.AUTO: NEGATIVE
PH UR STRIP: 7 [PH] (ref 5–8)
PLATELET # BLD AUTO: 351 K/UL (ref 135–420)
PMV BLD AUTO: 9.4 FL (ref 9.2–11.8)
POTASSIUM SERPL-SCNC: 3.4 MMOL/L (ref 3.5–5.5)
PROT SERPL-MCNC: 8.4 G/DL (ref 6.4–8.2)
PROT UR STRIP-MCNC: NEGATIVE MG/DL
RBC # BLD AUTO: 4.63 M/UL (ref 4.2–5.3)
SODIUM SERPL-SCNC: 138 MMOL/L (ref 136–145)
SP GR UR REFRACTOMETRY: 1.01 (ref 1–1.03)
UROBILINOGEN UR QL STRIP.AUTO: 0.2 EU/DL (ref 0.2–1)
WBC # BLD AUTO: 10.6 K/UL (ref 4.6–13.2)

## 2019-03-06 PROCEDURE — 85025 COMPLETE CBC W/AUTO DIFF WBC: CPT

## 2019-03-06 PROCEDURE — 83690 ASSAY OF LIPASE: CPT

## 2019-03-06 PROCEDURE — 84702 CHORIONIC GONADOTROPIN TEST: CPT

## 2019-03-06 PROCEDURE — 80053 COMPREHEN METABOLIC PANEL: CPT

## 2019-03-06 PROCEDURE — 81025 URINE PREGNANCY TEST: CPT

## 2019-03-06 PROCEDURE — 99283 EMERGENCY DEPT VISIT LOW MDM: CPT

## 2019-03-06 PROCEDURE — 81003 URINALYSIS AUTO W/O SCOPE: CPT

## 2019-03-07 ENCOUNTER — APPOINTMENT (OUTPATIENT)
Dept: ULTRASOUND IMAGING | Age: 29
End: 2019-03-07
Attending: NURSE PRACTITIONER
Payer: MEDICAID

## 2019-03-07 VITALS
SYSTOLIC BLOOD PRESSURE: 117 MMHG | DIASTOLIC BLOOD PRESSURE: 75 MMHG | TEMPERATURE: 98.1 F | OXYGEN SATURATION: 98 % | HEART RATE: 112 BPM | RESPIRATION RATE: 18 BRPM | HEIGHT: 63 IN | BODY MASS INDEX: 33.13 KG/M2 | WEIGHT: 187 LBS

## 2019-03-07 PROCEDURE — 76801 OB US < 14 WKS SINGLE FETUS: CPT

## 2019-03-07 NOTE — DISCHARGE INSTRUCTIONS
Patient Education        Belly Pain in Pregnancy: Care Instructions  Your Care Instructions    When you're pregnant, any belly pain can be a worry. You may not want to call your doctor about every pain you have. But you don't want to miss something that is dangerous for you or your baby. Even if it feels familiar, belly pain can mean something new when you're pregnant. It's important to know when to call your doctor. It will also help to know how to care for yourself at home when your pain is not caused by anything harmful. · When belly pain is more severe or constant, see a doctor right away. · If you're sure your belly pain is a sign of labor, call your doctor. · When belly pain is brief, it's usually a normal part of pregnancy. It might be related to changes in the growing uterus. Or it could be the stretching of ligaments called round ligaments. These ligaments help support the uterus. Round ligament pain can be on either side of your belly. It can also be felt in your hips or groin. Follow-up care is a key part of your treatment and safety. Be sure to make and go to all appointments, and call your doctor if you are having problems. It's also a good idea to know your test results and keep a list of the medicines you take. How can you tell if belly pain is a sign of labor? When belly pain is caused by labor, it can feel like mild or menstrual-like cramps in your lower belly. These cramps are probably contractions. They can happen in your second or third trimester. You may also have:  · A steady, dull ache in your lower back, pelvis, or thighs. · A feeling of pressure in your pelvis or lower belly. · Changes in your vaginal discharge or a sudden release of fluid from the vagina. If you think you are in labor, call your doctor. How can you care for yourself at home? When belly pain is mild and is not a symptom of labor:  · Rest until you feel better. · Take a warm bath.   · Think about what you drink and eat:  ? Drink plenty of fluids. Choose water and other caffeine-free clear liquids until you feel better. ? Try eating small, frequent meals. If your stomach is upset, try bland, low-fat foods like plain rice, broiled chicken, toast, and yogurt. · Think about how you move if you are having brief pains from stretching of the round ligaments. ? Try gentle stretching. ? Move a little more slowly when turning in bed or getting up from a chair, so those ligaments don't stretch quickly. ? Lean forward a bit if you think you are going to cough or sneeze. When should you call for help? Call 911 anytime you think you may need emergency care. For example, call if:    · You have sudden, severe pain in your belly.     · You have severe vaginal bleeding.    Call your doctor now or seek immediate medical care if:    · You have new or worse belly pain or cramping.     · You have any vaginal bleeding.     · You have a fever.     · You have symptoms of preeclampsia, such as:  ? Sudden swelling of your face, hands, or feet. ? New vision problems (such as dimness or blurring). ? A severe headache.     · You think that you may be in labor. This means that you've had at least 8 contractions within 1 hour or at least 4 contractions within 20 minutes, even after you change your position and drink fluids.     · You have symptoms of a urinary tract infection. These may include:  ? Pain or burning when you urinate. ? A frequent need to urinate without being able to pass much urine. ? Pain in the flank, which is just below the rib cage and above the waist on either side of the back. ? Blood in your urine.    Watch closely for changes in your health, and be sure to contact your doctor if you are worried about your or your baby's health. Where can you learn more? Go to http://matt-rashida.info/. Enter 404 [id number] 653 in the search box to learn more about \"Belly Pain in Pregnancy: Care Instructions. \"  Current as of: September 5, 2018  Content Version: 11.9  © 5183-6067 [employer]. Care instructions adapted under license by [employer] (which disclaims liability or warranty for this information). If you have questions about a medical condition or this instruction, always ask your healthcare professional. [address] any warranty or liability for your use of this information. Patient Education        Learning About Pregnancy  Your Care Instructions    Your health in the early weeks of your pregnancy is particularly important for your baby's health. Take good care of yourself. Anything you do that harms your body can also harm your baby. Make sure to go to all of your doctor appointments. Regular checkups will help keep you and your baby healthy. How can you care for yourself at home? Diet    · Eat a balanced diet. Make sure your diet includes plenty of beans, peas, and leafy green vegetables.     · Do not skip meals or go for many hours without eating. If you are nauseated, try to eat a small, healthy snack every 2 to 3 hours.     · Do not eat fish that has a high level of mercury, such as shark, swordfish, or mackerel. Do not eat more than one can of tuna each week.     · Drink plenty of fluids, enough so that your urine is light yellow or clear like water. If you have kidney, heart, or liver disease and have to limit fluids, talk with your doctor before you increase the amount of fluids you drink.     · Cut down on caffeine, such as coffee, tea, and cola.     · Do not drink alcohol, such as beer, wine, or hard liquor.     · Take a multivitamin that contains at least 400 micrograms (mcg) of folic acid to help prevent birth defects. Fortified cereal and whole wheat bread are good additional sources of folic acid.     · Increase the calcium in your diet. Try to drink a quart of skim milk each day.  You may also take calcium supplements and choose foods such as cheese and yogurt.    Lifestyle    · Make sure you go to your follow-up appointments.     · Get plenty of rest. You may be unusually tired while you are pregnant.     · Get at least 30 minutes of exercise on most days of the week. Walking is a good choice. If you have not exercised in the past, start out slowly. Take several short walks each day.     · Do not smoke. If you need help quitting, talk to your doctor about stop-smoking programs. These can increase your chances of quitting for good.     · Do not touch cat feces or litter boxes. Also, wash your hands after you handle raw meat, and fully cook all meat before you eat it. Wear gloves when you work in the yard or garden, and wash your hands well when you are done. Cat feces, raw or undercooked meat, and contaminated dirt can cause an infection that may harm your baby or lead to a miscarriage.     · Do not use saunas or hot tubs. Raising your body temperature may harm your baby.     · Avoid chemical fumes, paint fumes, or poisons.     · Do not use illegal drugs or alcohol. Medicines    · Review all of your medicines with your doctor. Some of your routine medicines may need to be changed to protect your baby.     · Use acetaminophen (Tylenol) to relieve minor problems, such as a mild headache or backache or a mild fever with cold symptoms. Do not use nonsteroidal anti-inflammatory drugs (NSAIDs), such as ibuprofen (Advil, Motrin) or naproxen (Aleve), unless your doctor says it is okay.     · Do not take two or more pain medicines at the same time unless the doctor told you to. Many pain medicines have acetaminophen, which is Tylenol. Too much acetaminophen (Tylenol) can be harmful.     · Take your medicines exactly as prescribed. Call your doctor if you think you are having a problem with your medicine.    To manage morning sickness    · If you feel sick when you first wake up, try eating a small snack (such as crackers) before you get out of bed.  Allow some time to digest the snack, and then get out of bed slowly.     · Do not skip meals or go for long periods without eating. An empty stomach can make nausea worse.     · Eat small, frequent meals instead of three large meals each day.     · Drink plenty of fluids. Sports drinks, such as Gatorade or Powerade, are good choices.     · Eat foods that are high in protein but low in fat.     · If you are taking iron supplements, ask your doctor if they are necessary. Iron can make nausea worse.     · Avoid any smells, such as coffee, that make you feel sick.     · Get lots of rest. Morning sickness may be worse when you are tired. Follow-up care is a key part of your treatment and safety. Be sure to make and go to all appointments, and call your doctor if you are having problems. It's also a good idea to know your test results and keep a list of the medicines you take. Where can you learn more? Go to http://matt-rashida.info/. Enter G561 in the search box to learn more about \"Learning About Pregnancy. \"  Current as of: September 5, 2018  Content Version: 11.9  © 9935-8444 [employer]. Care instructions adapted under license by [employer] (which disclaims liability or warranty for this information). If you have questions about a medical condition or this instruction, always ask your healthcare professional. [address] any warranty or liability for your use of this information.

## 2019-03-07 NOTE — ED PROVIDER NOTES
Varun Hook is a 32 y/o female who presents c/o abd pain with associated nausea described as cramping. Pt states lower abd pain has been ongoing x 1 week now. She denies hx of DM but adds she has been seeing a weight loss clinic who prescribed water pills, Metformin, and Phentermine; has been going to clinic for last year. Denies fever, v/d, or urinary sx. Does not remember LNMP. Says nothing has alleviated her pain. The history is provided by the patient. Past Medical History:   Diagnosis Date    Gestational diabetes        Past Surgical History:   Procedure Laterality Date    HX  SECTION  , ,          Family History:   Problem Relation Age of Onset    Cancer Paternal Grandfather         lung- smoker    Cancer Paternal Grandmother         unknown    Cancer Mother         breast- unknown age       Social History     Socioeconomic History    Marital status: SINGLE     Spouse name: Not on file    Number of children: Not on file    Years of education: Not on file    Highest education level: Not on file   Social Needs    Financial resource strain: Not on file    Food insecurity - worry: Not on file    Food insecurity - inability: Not on file   [employer] needs - medical: Not on file   [employer] needs - non-medical: Not on file   Occupational History    Not on file   Tobacco Use    Smoking status: Never Smoker    Smokeless tobacco: Never Used   Substance and Sexual Activity    Alcohol use: No     Comment: rarely    Drug use: No    Sexual activity: Yes     Partners: Male     Birth control/protection: Condom   Other Topics Concern    Not on file   Social History Narrative    Not on file         ALLERGIES: Vicodin [hydrocodone-acetaminophen]    Review of Systems   Constitutional: Negative for fever. Gastrointestinal: Positive for abdominal pain and nausea. Negative for diarrhea and vomiting. Genitourinary: Negative for dysuria.    Neurological: Negative for dizziness. All other systems reviewed and are negative. Vitals:    03/06/19 2155 03/07/19 0224   BP: 134/88 117/75   Pulse: (!) 129 (!) 112   Resp: 18 18   Temp: 98.1 °F (36.7 °C)    SpO2: 98% 98%   Weight: 84.8 kg (187 lb)    Height: 5' 3\" (1.6 m)             Physical Exam   Constitutional: She is oriented to person, place, and time. She appears well-developed and well-nourished. HENT:   Head: Normocephalic and atraumatic. Eyes: Conjunctivae and EOM are normal. Pupils are equal, round, and reactive to light. Neck: Normal range of motion. Neck supple. Cardiovascular: Normal rate and regular rhythm. Pulmonary/Chest: Effort normal and breath sounds normal.   Abdominal: Soft. Bowel sounds are normal. There is tenderness. Mild generalized tenderness   Musculoskeletal: Normal range of motion. Neurological: She is alert and oriented to person, place, and time. She has normal reflexes. Skin: Skin is warm and dry. Psychiatric: She has a normal mood and affect. Her behavior is normal. Judgment and thought content normal.   Nursing note and vitals reviewed. MDM  Number of Diagnoses or Management Options  Normal intrauterine pregnancy on prenatal ultrasound in first trimester: established and improving  Pregnancy test performed, pregnancy confirmed: established and improving  Diagnosis management comments: Pt with mild generalized tenderness to abd, she denies any risk of std and does not want checked. Pt denies any vaginal bleeding or vaginal discharge. ua preg back and is positive. Additional labs ordered and ob ultrasound pt informed of incidental positive pregnancy. Ultrasound shows 6 week pregnancy IUP. Pt advised pelvic rest and f/u with pcp. And ob is provided.    Pt on medication for diet, phentermine, water pill, and metformin, she is advised to stop the diet medications and discuss with her ob md.  She is rxd prenatal vitamins       Amount and/or Complexity of Data Reviewed  Clinical lab tests: ordered and reviewed  Tests in the radiology section of CPT®: ordered and reviewed  Review and summarize past medical records: yes  Independent visualization of images, tracings, or specimens: yes    Risk of Complications, Morbidity, and/or Mortality  Presenting problems: moderate  Diagnostic procedures: moderate  Management options: moderate    Patient Progress  Patient progress: improved         Procedures            Vitals:  Patient Vitals for the past 12 hrs:   Temp Pulse Resp BP SpO2   03/07/19 0224  (!) 112 18 117/75 98 %   03/06/19 2155 98.1 °F (36.7 °C) (!) 129 18 134/88 98 %       Medications ordered:   Medications - No data to display      Lab findings:  Recent Results (from the past 12 hour(s))   URINALYSIS W/ RFLX MICROSCOPIC    Collection Time: 03/06/19  9:53 PM   Result Value Ref Range    Color YELLOW      Appearance CLEAR      Specific gravity 1.006 1.005 - 1.030      pH (UA) 7.0 5.0 - 8.0      Protein NEGATIVE  NEG mg/dL    Glucose NEGATIVE  NEG mg/dL    Ketone NEGATIVE  NEG mg/dL    Bilirubin NEGATIVE  NEG      Blood NEGATIVE  NEG      Urobilinogen 0.2 0.2 - 1.0 EU/dL    Nitrites NEGATIVE  NEG      Leukocyte Esterase NEGATIVE  NEG     HCG URINE, QL    Collection Time: 03/06/19  9:53 PM   Result Value Ref Range    HCG urine, QL POSITIVE (A) NEG     CBC WITH AUTOMATED DIFF    Collection Time: 03/06/19 10:05 PM   Result Value Ref Range    WBC 10.6 4.6 - 13.2 K/uL    RBC 4.63 4.20 - 5.30 M/uL    HGB 13.5 12.0 - 16.0 g/dL    HCT 38.5 35.0 - 45.0 %    MCV 83.2 74.0 - 97.0 FL    MCH 29.2 24.0 - 34.0 PG    MCHC 35.1 31.0 - 37.0 g/dL    RDW 13.2 11.6 - 14.5 %    PLATELET 185 635 - 942 K/uL    MPV 9.4 9.2 - 11.8 FL    NEUTROPHILS 63 40 - 73 %    LYMPHOCYTES 31 21 - 52 %    MONOCYTES 5 3 - 10 %    EOSINOPHILS 1 0 - 5 %    BASOPHILS 0 0 - 2 %    ABS. NEUTROPHILS 6.6 1.8 - 8.0 K/UL    ABS. LYMPHOCYTES 3.3 0.9 - 3.6 K/UL    ABS. MONOCYTES 0.5 0.05 - 1.2 K/UL    ABS.  EOSINOPHILS 0.1 0.0 - 0.4 K/UL    ABS. BASOPHILS 0.0 0.0 - 0.1 K/UL    DF AUTOMATED     METABOLIC PANEL, COMPREHENSIVE    Collection Time: 03/06/19 10:05 PM   Result Value Ref Range    Sodium 138 136 - 145 mmol/L    Potassium 3.4 (L) 3.5 - 5.5 mmol/L    Chloride 103 100 - 108 mmol/L    CO2 30 21 - 32 mmol/L    Anion gap 5 3.0 - 18 mmol/L    Glucose 83 74 - 99 mg/dL    BUN 10 7.0 - 18 MG/DL    Creatinine 0.64 0.6 - 1.3 MG/DL    BUN/Creatinine ratio 16 12 - 20      GFR est AA >60 >60 ml/min/1.73m2    GFR est non-AA >60 >60 ml/min/1.73m2    Calcium 9.1 8.5 - 10.1 MG/DL    Bilirubin, total 0.4 0.2 - 1.0 MG/DL    ALT (SGPT) 22 13 - 56 U/L    AST (SGOT) 11 (L) 15 - 37 U/L    Alk. phosphatase 60 45 - 117 U/L    Protein, total 8.4 (H) 6.4 - 8.2 g/dL    Albumin 4.2 3.4 - 5.0 g/dL    Globulin 4.2 (H) 2.0 - 4.0 g/dL    A-G Ratio 1.0 0.8 - 1.7     LIPASE    Collection Time: 03/06/19 10:05 PM   Result Value Ref Range    Lipase 135 73 - 393 U/L   BETA HCG, QT    Collection Time: 03/06/19 10:05 PM   Result Value Ref Range    Beta HCG, QT 25,857 (H) 0 - 10 MIU/ML            X-Ray, CT or other radiology findings or impressions:  US PREG UTS < 14 WKS SNGL   Final Result   IMPRESSION: Intrauterine pregnancy at 6 weeks 1 day with positive fetal heart   rate. Disposition:    Diagnosis:   1. Pregnancy test performed, pregnancy confirmed    2. Normal intrauterine pregnancy on prenatal ultrasound in first trimester        Disposition: to Home      Follow-up Information     Follow up With Specialties Details Why Contact Info    Nandini Lowe NP Nurse Practitioner   [address]      Yuan Werner MD Obstetrics & Gynecology, Obstetrics, Gynecology In 2 days  Mahesh Yang  [phone]                Medication List      START taking these medications    PNV Comb. No76-Iron,Carbonyl-FA 29 mg iron- 1 mg Tab  Commonly known as:  PRENATABS RX  Take 1 Tab by mouth daily for 30 days. ASK your doctor about these medications    BENADRYL 25 mg capsule  Generic drug:  diphenhydrAMINE           Where to Get Your Medications      Information about where to get these medications is not yet available    Ask your nurse or doctor about these medications  · PNV Comb. No76-Iron,Carbonyl-FA 29 mg iron- 1 mg Tab         Return to the ER if you are unable to obtain referral as directed. Helen Manuel's  results have been reviewed with her. She has been counseled regarding her diagnosis, treatment, and plan. She verbally conveys understanding and agreement of the signs, symptoms, diagnosis, treatment and prognosis and additionally agrees to follow up as discussed. She also agrees with the care-plan and conveys that all of her questions have been answered. I have also provided discharge instructions for her that include: educational information regarding their diagnosis and treatment, and list of reasons why they would want to return to the ED prior to their follow-up appointment, should her condition change. MAYANK Camacho    Scribe Attestation     Germaine Merino acting as a scribe for and in the presence of LOLA Park      March 06, 2019 at 11:57 PM       Provider Attestation:      I personally performed the services described in the documentation, reviewed the documentation, as recorded by the scribe in my presence, and it accurately and completely records my words and actions.  March 06, 2019 at 11:57 PM - TANVI Park/MAYANK

## 2019-05-12 ENCOUNTER — APPOINTMENT (OUTPATIENT)
Dept: GENERAL RADIOLOGY | Age: 29
End: 2019-05-12
Attending: PHYSICIAN ASSISTANT
Payer: MEDICAID

## 2019-05-12 ENCOUNTER — HOSPITAL ENCOUNTER (EMERGENCY)
Age: 29
Discharge: HOME | End: 2019-05-12
Attending: EMERGENCY MEDICINE | Admitting: EMERGENCY MEDICINE
Payer: MEDICAID

## 2019-05-12 VITALS
SYSTOLIC BLOOD PRESSURE: 136 MMHG | RESPIRATION RATE: 16 BRPM | OXYGEN SATURATION: 98 % | HEART RATE: 92 BPM | HEIGHT: 63 IN | TEMPERATURE: 98.8 F | DIASTOLIC BLOOD PRESSURE: 81 MMHG | BODY MASS INDEX: 35.26 KG/M2 | WEIGHT: 199 LBS

## 2019-05-12 DIAGNOSIS — J06.9 VIRAL URI WITH COUGH: Primary | ICD-10-CM

## 2019-05-12 LAB
ALBUMIN SERPL-MCNC: 3.9 G/DL (ref 3.4–5)
ALBUMIN/GLOB SERPL: 1 {RATIO} (ref 0.8–1.7)
ALP SERPL-CCNC: 62 U/L (ref 45–117)
ALT SERPL-CCNC: 20 U/L (ref 13–56)
ANION GAP SERPL CALC-SCNC: 4 MMOL/L (ref 3–18)
APPEARANCE UR: ABNORMAL
AST SERPL-CCNC: 10 U/L (ref 15–37)
BACTERIA URNS QL MICRO: ABNORMAL /HPF
BASOPHILS # BLD: 0 K/UL (ref 0–0.1)
BASOPHILS NFR BLD: 0 % (ref 0–2)
BILIRUB SERPL-MCNC: 0.5 MG/DL (ref 0.2–1)
BILIRUB UR QL: NEGATIVE
BUN SERPL-MCNC: 10 MG/DL (ref 7–18)
BUN/CREAT SERPL: 14 (ref 12–20)
CALCIUM SERPL-MCNC: 9.3 MG/DL (ref 8.5–10.1)
CHLORIDE SERPL-SCNC: 104 MMOL/L (ref 100–108)
CO2 SERPL-SCNC: 31 MMOL/L (ref 21–32)
COLOR UR: YELLOW
CREAT SERPL-MCNC: 0.69 MG/DL (ref 0.6–1.3)
DIFFERENTIAL METHOD BLD: NORMAL
EOSINOPHIL # BLD: 0.1 K/UL (ref 0–0.4)
EOSINOPHIL NFR BLD: 1 % (ref 0–5)
EPITH CASTS URNS QL MICRO: ABNORMAL /LPF (ref 0–5)
ERYTHROCYTE [DISTWIDTH] IN BLOOD BY AUTOMATED COUNT: 13.7 % (ref 11.6–14.5)
GLOBULIN SER CALC-MCNC: 4 G/DL (ref 2–4)
GLUCOSE SERPL-MCNC: 92 MG/DL (ref 74–99)
GLUCOSE UR STRIP.AUTO-MCNC: NEGATIVE MG/DL
HCG UR QL: NEGATIVE
HCT VFR BLD AUTO: 40.6 % (ref 35–45)
HGB BLD-MCNC: 13.6 G/DL (ref 12–16)
HGB UR QL STRIP: NEGATIVE
KETONES UR QL STRIP.AUTO: NEGATIVE MG/DL
LEUKOCYTE ESTERASE UR QL STRIP.AUTO: ABNORMAL
LYMPHOCYTES # BLD: 2.3 K/UL (ref 0.9–3.6)
LYMPHOCYTES NFR BLD: 35 % (ref 21–52)
MCH RBC QN AUTO: 29.1 PG (ref 24–34)
MCHC RBC AUTO-ENTMCNC: 33.5 G/DL (ref 31–37)
MCV RBC AUTO: 86.9 FL (ref 74–97)
MONOCYTES # BLD: 0.6 K/UL (ref 0.05–1.2)
MONOCYTES NFR BLD: 9 % (ref 3–10)
NEUTS SEG # BLD: 3.7 K/UL (ref 1.8–8)
NEUTS SEG NFR BLD: 55 % (ref 40–73)
NITRITE UR QL STRIP.AUTO: NEGATIVE
PH UR STRIP: 7.5 [PH] (ref 5–8)
PLATELET # BLD AUTO: 306 K/UL (ref 135–420)
PMV BLD AUTO: 9.6 FL (ref 9.2–11.8)
POTASSIUM SERPL-SCNC: 3.9 MMOL/L (ref 3.5–5.5)
PROT SERPL-MCNC: 7.9 G/DL (ref 6.4–8.2)
PROT UR STRIP-MCNC: NEGATIVE MG/DL
RBC # BLD AUTO: 4.67 M/UL (ref 4.2–5.3)
RBC #/AREA URNS HPF: ABNORMAL /HPF (ref 0–5)
SODIUM SERPL-SCNC: 139 MMOL/L (ref 136–145)
SP GR UR REFRACTOMETRY: 1.03 (ref 1–1.03)
UROBILINOGEN UR QL STRIP.AUTO: 1 EU/DL (ref 0.2–1)
WBC # BLD AUTO: 6.7 K/UL (ref 4.6–13.2)
WBC URNS QL MICRO: ABNORMAL /HPF (ref 0–4)

## 2019-05-12 PROCEDURE — 74011250636 HC RX REV CODE- 250/636: Performed by: PHYSICIAN ASSISTANT

## 2019-05-12 PROCEDURE — 96360 HYDRATION IV INFUSION INIT: CPT

## 2019-05-12 PROCEDURE — 80053 COMPREHEN METABOLIC PANEL: CPT

## 2019-05-12 PROCEDURE — 71046 X-RAY EXAM CHEST 2 VIEWS: CPT

## 2019-05-12 PROCEDURE — 85025 COMPLETE CBC W/AUTO DIFF WBC: CPT

## 2019-05-12 PROCEDURE — 99283 EMERGENCY DEPT VISIT LOW MDM: CPT

## 2019-05-12 PROCEDURE — 81025 URINE PREGNANCY TEST: CPT

## 2019-05-12 PROCEDURE — 96361 HYDRATE IV INFUSION ADD-ON: CPT

## 2019-05-12 PROCEDURE — 81001 URINALYSIS AUTO W/SCOPE: CPT

## 2019-05-12 RX ORDER — BENZONATATE 100 MG/1
100 CAPSULE ORAL
Qty: 30 CAP | Refills: 0 | Status: SHIPPED | OUTPATIENT
Start: 2019-05-12 | End: 2019-05-19

## 2019-05-12 RX ADMIN — SODIUM CHLORIDE 1000 ML: 900 INJECTION, SOLUTION INTRAVENOUS at 10:01

## 2019-05-12 NOTE — ED NOTES
Back from CT; pain reassessed. Pt. States \"I'm feeling a little bit better\" rates pain a 5/10. Warm blankets given for comfort. Siderails up and CBWR. Note entered on incorrect pt.

## 2019-05-12 NOTE — DISCHARGE INSTRUCTIONS
Patient Education      Please return immediately to the Emergency Room for re-evaluation if you are not improving, develop any new symptoms, or develop worsening of current symptoms! If you have been prescribed a medication and are unable to take this medication for any reason, please return to the Emergency Department for further evaluation! If you have been referred for follow-up to a specialist, but are unable to follow-up and your symptoms are either not improving or are worsening, please return to the Emergency Department for further evaluation! Upper Respiratory Infection (Cold): Care Instructions  Your Care Instructions    An upper respiratory infection, or URI, is an infection of the nose, sinuses, or throat. URIs are spread by coughs, sneezes, and direct contact. The common cold is the most frequent kind of URI. The flu and sinus infections are other kinds of URIs. Almost all URIs are caused by viruses. Antibiotics won't cure them. But you can treat most infections with home care. This may include drinking lots of fluids and taking over-the-counter pain medicine. You will probably feel better in 4 to 10 days. The doctor has checked you carefully, but problems can develop later. If you notice any problems or new symptoms, get medical treatment right away. Follow-up care is a key part of your treatment and safety. Be sure to make and go to all appointments, and call your doctor if you are having problems. It's also a good idea to know your test results and keep a list of the medicines you take. How can you care for yourself at home? · To prevent dehydration, drink plenty of fluids, enough so that your urine is light yellow or clear like water. Choose water and other caffeine-free clear liquids until you feel better. If you have kidney, heart, or liver disease and have to limit fluids, talk with your doctor before you increase the amount of fluids you drink.   · Take an over-the-counter pain medicine, such as acetaminophen (Tylenol), ibuprofen (Advil, Motrin), or naproxen (Aleve). Read and follow all instructions on the label. · Before you use cough and cold medicines, check the label. These medicines may not be safe for young children or for people with certain health problems. · Be careful when taking over-the-counter cold or flu medicines and Tylenol at the same time. Many of these medicines have acetaminophen, which is Tylenol. Read the labels to make sure that you are not taking more than the recommended dose. Too much acetaminophen (Tylenol) can be harmful. · Get plenty of rest.  · Do not smoke or allow others to smoke around you. If you need help quitting, talk to your doctor about stop-smoking programs and medicines. These can increase your chances of quitting for good. When should you call for help? Call 911 anytime you think you may need emergency care. For example, call if:    · You have severe trouble breathing.    Call your doctor now or seek immediate medical care if:    · You seem to be getting much sicker.     · You have new or worse trouble breathing.     · You have a new or higher fever.     · You have a new rash.    Watch closely for changes in your health, and be sure to contact your doctor if:    · You have a new symptom, such as a sore throat, an earache, or sinus pain.     · You cough more deeply or more often, especially if you notice more mucus or a change in the color of your mucus.     · You do not get better as expected. Where can you learn more? Go to http://matt-rashida.info/. Enter Y864 in the search box to learn more about \"Upper Respiratory Infection (Cold): Care Instructions. \"  Current as of: September 5, 2018  Content Version: 11.9  © 3328-1600 [employer]. Care instructions adapted under license by [employer] (which disclaims liability or warranty for this information).  If you have questions about a medical condition or this instruction, always ask your healthcare professional. [address] any warranty or liability for your use of this information. Patient Education        Viral Respiratory Infection: Care Instructions  Your Care Instructions    Viruses are very small organisms. They grow in number after they enter your body. There are many types that cause different illnesses, such as colds and the mumps. The symptoms of a viral respiratory infection often start quickly. They include a fever, sore throat, and runny nose. You may also just not feel well. Or you may not want to eat much. Most viral respiratory infections are not serious. They usually get better with time and self-care. Antibiotics are not used to treat a viral infection. That's because antibiotics will not help cure a viral illness. In some cases, antiviral medicine can help your body fight a serious viral infection. Follow-up care is a key part of your treatment and safety. Be sure to make and go to all appointments, and call your doctor if you are having problems. It's also a good idea to know your test results and keep a list of the medicines you take. How can you care for yourself at home? · Rest as much as possible until you feel better. · Be safe with medicines. Take your medicine exactly as prescribed. Call your doctor if you think you are having a problem with your medicine. You will get more details on the specific medicine your doctor prescribes. · Take an over-the-counter pain medicine, such as acetaminophen (Tylenol), ibuprofen (Advil, Motrin), or naproxen (Aleve), as needed for pain and fever. Read and follow all instructions on the label. Do not give aspirin to anyone younger than 20. It has been linked to Reye syndrome, a serious illness. · Drink plenty of fluids, enough so that your urine is light yellow or clear like water.  Hot fluids, such as tea or soup, may help relieve congestion in your nose and throat. If you have kidney, heart, or liver disease and have to limit fluids, talk with your doctor before you increase the amount of fluids you drink. · Try to clear mucus from your lungs by breathing deeply and coughing. · Gargle with warm salt water once an hour. This can help reduce swelling and throat pain. Use 1 teaspoon of salt mixed in 1 cup of warm water. · Do not smoke or allow others to smoke around you. If you need help quitting, talk to your doctor about stop-smoking programs and medicines. These can increase your chances of quitting for good. To avoid spreading the virus  · Cough or sneeze into a tissue. Then throw the tissue away. · If you don't have a tissue, use your hand to cover your cough or sneeze. Then clean your hand. You can also cough into your sleeve. · Wash your hands often. Use soap and warm water. Wash for 15 to 20 seconds each time. · If you don't have soap and water near you, you can clean your hands with alcohol wipes or gel. When should you call for help? Call your doctor now or seek immediate medical care if:    · You have a new or higher fever.     · Your fever lasts more than 48 hours.     · You have trouble breathing.     · You have a fever with a stiff neck or a severe headache.     · You are sensitive to light.     · You feel very sleepy or confused.    Watch closely for changes in your health, and be sure to contact your doctor if:    · You do not get better as expected. Where can you learn more? Go to http://matt-rashida.info/. Enter Q449 in the search box to learn more about \"Viral Respiratory Infection: Care Instructions. \"  Current as of: September 5, 2018  Content Version: 11.9  © 0036-2573 [employer]. Care instructions adapted under license by [employer] (which disclaims liability or warranty for this information).  If you have questions about a medical condition or this instruction, always ask your healthcare professional. [address] any warranty or liability for your use of this information.

## 2019-05-12 NOTE — ED TRIAGE NOTES
The patient presents for evaluation of a cough x one week, a sore throat, and generalized body aches.

## 2019-05-12 NOTE — ED PROVIDER NOTES
EMERGENCY DEPARTMENT HISTORY AND PHYSICAL EXAM    9:15 AM      Date: 2019  Patient Name: Abigail Jordan    History of Presenting Illness     Chief Complaint   Patient presents with    Flu Like Symptoms       History Provided By: Patient    Chief Complaint: dry cough, nasal congestion, rhinorrhea, sore throat  Duration: 4 Days  Timing:  Acute  Location:   Quality: Aching  Severity: 8 out of 10  Modifying Factors: none  Associated Symptoms: denies any other associated signs or symptoms      Additional History (Context):Helen Crespo is a 33 y.o. female with a pertinent history of gestational diabetes who presents to the emergency department for evaluation of sore throat, dry cough, nasal congestion, rhinorrhea x4 days. No fevers or chills. Patient reports she works at a  and gets colds frequently. She denies recent nausea, vomiting, diarrhea, constipation. She denies possibility of pregnancy, but has not had. She recently had an  and states that her cycles are messed up right now. No treatments prior to arrival.  No other complaints at this time. PCP:  Dedra Worley NP      Current Outpatient Medications   Medication Sig Dispense Refill    benzonatate (TESSALON PERLES) 100 mg capsule Take 1 Cap by mouth three (3) times daily as needed for Cough for up to 7 days. 30 Cap 0    diphenhydrAMINE (BENADRYL) 25 mg capsule Take 50 mg by mouth every six (6) hours as needed.          Past History     Past Medical History:  Past Medical History:   Diagnosis Date    Gestational diabetes        Past Surgical History:  Past Surgical History:   Procedure Laterality Date    [de-identified]  SECTION  , ,        Family History:  Family History   Problem Relation Age of Onset    Cancer Paternal Grandfather         lung- smoker    Cancer Paternal Grandmother         unknown    Cancer Mother         breast- unknown age       Social History:  Social History     Tobacco Use    Smoking status: Never Smoker    Smokeless tobacco: Never Used   Substance Use Topics    Alcohol use: Yes     Comment: rarely    Drug use: No       Allergies: Allergies   Allergen Reactions    Vicodin [Hydrocodone-Acetaminophen] Anxiety         Review of Systems       Review of Systems   Constitutional: Negative for chills and fever. HENT: Positive for congestion, rhinorrhea and sore throat. Respiratory: Positive for cough. Negative for shortness of breath. Cardiovascular: Negative for chest pain. Gastrointestinal: Negative for abdominal pain, blood in stool, constipation, diarrhea, nausea and vomiting. Genitourinary: Negative for dysuria, frequency and hematuria. Musculoskeletal: Negative for back pain and myalgias. Skin: Negative for rash and wound. Neurological: Negative for dizziness and headaches. All other systems reviewed and are negative. Physical Exam     Visit Vitals  /81 (BP 1 Location: Left arm, BP Patient Position: At rest)   Pulse (!) 112   Temp 98.8 °F (37.1 °C)   Resp 16   Ht 5' 3\" (1.6 m)   Wt 90.3 kg (199 lb)   SpO2 96%   BMI 35.25 kg/m²       Physical Exam   Constitutional: She is oriented to person, place, and time. She appears well-developed and well-nourished. No distress. HENT:   Head: Normocephalic and atraumatic. Nose: Nose normal.   Mouth/Throat: Oropharynx is clear and moist. No oropharyngeal exudate. Eyes: Pupils are equal, round, and reactive to light. Conjunctivae and EOM are normal. Right eye exhibits no discharge. Left eye exhibits no discharge. Neck: Normal range of motion. Neck supple. No thyromegaly present. Cardiovascular: Regular rhythm and normal heart sounds. Tachycardia present. Pulmonary/Chest: Effort normal and breath sounds normal. No respiratory distress. She has no wheezes. She has no rales. She exhibits no tenderness. Abdominal: Soft. Bowel sounds are normal. She exhibits no distension and no mass. There is no tenderness. There is no rebound and no guarding. Musculoskeletal: She exhibits no edema or deformity. Lymphadenopathy:     She has no cervical adenopathy. Neurological: She is alert and oriented to person, place, and time. She has normal reflexes. Skin: Skin is warm and dry. No rash noted. She is not diaphoretic. Psychiatric: She has a normal mood and affect. Nursing note and vitals reviewed. Diagnostic Study Results     Labs -  Recent Results (from the past 12 hour(s))   CBC WITH AUTOMATED DIFF    Collection Time: 05/12/19  9:33 AM   Result Value Ref Range    WBC 6.7 4.6 - 13.2 K/uL    RBC 4.67 4.20 - 5.30 M/uL    HGB 13.6 12.0 - 16.0 g/dL    HCT 40.6 35.0 - 45.0 %    MCV 86.9 74.0 - 97.0 FL    MCH 29.1 24.0 - 34.0 PG    MCHC 33.5 31.0 - 37.0 g/dL    RDW 13.7 11.6 - 14.5 %    PLATELET 734 807 - 634 K/uL    MPV 9.6 9.2 - 11.8 FL    NEUTROPHILS 55 40 - 73 %    LYMPHOCYTES 35 21 - 52 %    MONOCYTES 9 3 - 10 %    EOSINOPHILS 1 0 - 5 %    BASOPHILS 0 0 - 2 %    ABS. NEUTROPHILS 3.7 1.8 - 8.0 K/UL    ABS. LYMPHOCYTES 2.3 0.9 - 3.6 K/UL    ABS. MONOCYTES 0.6 0.05 - 1.2 K/UL    ABS. EOSINOPHILS 0.1 0.0 - 0.4 K/UL    ABS. BASOPHILS 0.0 0.0 - 0.1 K/UL    DF AUTOMATED     METABOLIC PANEL, COMPREHENSIVE    Collection Time: 05/12/19  9:33 AM   Result Value Ref Range    Sodium 139 136 - 145 mmol/L    Potassium 3.9 3.5 - 5.5 mmol/L    Chloride 104 100 - 108 mmol/L    CO2 31 21 - 32 mmol/L    Anion gap 4 3.0 - 18 mmol/L    Glucose 92 74 - 99 mg/dL    BUN 10 7.0 - 18 MG/DL    Creatinine 0.69 0.6 - 1.3 MG/DL    BUN/Creatinine ratio 14 12 - 20      GFR est AA >60 >60 ml/min/1.73m2    GFR est non-AA >60 >60 ml/min/1.73m2    Calcium 9.3 8.5 - 10.1 MG/DL    Bilirubin, total 0.5 0.2 - 1.0 MG/DL    ALT (SGPT) 20 13 - 56 U/L    AST (SGOT) 10 (L) 15 - 37 U/L    Alk.  phosphatase 62 45 - 117 U/L    Protein, total 7.9 6.4 - 8.2 g/dL    Albumin 3.9 3.4 - 5.0 g/dL    Globulin 4.0 2.0 - 4.0 g/dL    A-G Ratio 1.0 0.8 - 1.7     URINALYSIS W/ RFLX MICROSCOPIC    Collection Time: 05/12/19 10:20 AM   Result Value Ref Range    Color YELLOW      Appearance CLOUDY      Specific gravity 1.029 1.005 - 1.030      pH (UA) 7.5 5.0 - 8.0      Protein NEGATIVE  NEG mg/dL    Glucose NEGATIVE  NEG mg/dL    Ketone NEGATIVE  NEG mg/dL    Bilirubin NEGATIVE  NEG      Blood NEGATIVE  NEG      Urobilinogen 1.0 0.2 - 1.0 EU/dL    Nitrites NEGATIVE  NEG      Leukocyte Esterase TRACE (A) NEG     HCG URINE, QL    Collection Time: 05/12/19 10:20 AM   Result Value Ref Range    HCG urine, QL NEGATIVE  NEG     URINE MICROSCOPIC ONLY    Collection Time: 05/12/19 10:20 AM   Result Value Ref Range    WBC 4 to 5 0 - 4 /hpf    RBC NONE 0 - 5 /hpf    Epithelial cells 3+ 0 - 5 /lpf    Bacteria 1+ (A) NEG /hpf       Radiologic Studies -   Xr Chest Pa Lat    Result Date: 5/12/2019  CHEST, PA AND LATERAL CPT CODE: 46770 INDICATION: Cough x1 week. COMPARISON: 7/6/2017. TECHNIQUE: PA and lateral chest radiographs are reviewed. FINDINGS: The lungs appear clear without  evidence of focal pulmonary infiltrate, pulmonary edema or pleural effusion. The cardiomediastinal contours are within normal limits. No acute osseous abnormalities identified. IMPRESSION: No evidence of acute pulmonary disease or significant interval change. Medical Decision Making   I am the first provider for this patient. I reviewed the vital signs, available nursing notes, past medical history, past surgical history, family history and social history. Vital Signs-Reviewed the patient's vital signs.     Pulse Oximetry Analysis -  96% on room air (Interpretation)    Records Reviewed: Nursing Notes and Old Medical Records (Time of Review: 9:15 AM)    ED Course: Progress Notes, Reevaluation, and Consults:      Provider Notes (Medical Decision Making):   Differential Diagnosis:  influenza, mononucleosis, acute bronchitis, URI, streptococcal pharyngitis, pertussis, pneumonia, asthma exacerbation, allergic rhinitis      Plan: Patient presents ambulatory in no acute distress with elevated heart rate and otherwise normal vitals. Exam and HPI are consistent with uncomplicated viral URI. Labs and imaging are wnl. Will DC home with tessalon perles. ADvised to stay hydrated. At this time, patient is stable and appropriate for discharge home. Patient demonstrates understanding of current diagnoses and is in agreement with the treatment plan. They are advised that while the likelihood of serious underlying condition is low at this point given the evaluation performed today, we cannot fully rule it out. They are advised to immediately return with any new symptoms or worsening of current condition. All questions have been answered. Patient is given educational material regarding their diagnoses, including danger symptoms and when to return to the ED. Diagnosis     Clinical Impression:   1. Viral URI with cough        Disposition: DC Home    Follow-up Information     Follow up With Specialties Details Why Contact Info    Joe Aburto NP Nurse Practitioner Call in 2 days  [address] [place] [address] 15 [address] [facility] [place]      [facility] EMERGENCY DEPT Emergency Medicine Go to As needed, If symptoms worsen [address] [facility] [address] [phone]           Patient's Medications   Start Taking    BENZONATATE (TESSALON PERLES) 100 MG CAPSULE    Take 1 Cap by mouth three (3) times daily as needed for Cough for up to 7 days. Continue Taking    DIPHENHYDRAMINE (BENADRYL) 25 MG CAPSULE    Take 50 mg by mouth every six (6) hours as needed.    These Medications have changed    No medications on file   Stop Taking    No medications on file     _______________________________

## 2019-10-27 ENCOUNTER — HOSPITAL ENCOUNTER (EMERGENCY)
Dept: ULTRASOUND IMAGING | Age: 29
Discharge: HOME | End: 2019-10-27
Attending: EMERGENCY MEDICINE
Payer: MEDICAID

## 2019-10-27 ENCOUNTER — HOSPITAL ENCOUNTER (EMERGENCY)
Age: 29
Discharge: HOME | End: 2019-10-27
Attending: EMERGENCY MEDICINE
Payer: MEDICAID

## 2019-10-27 VITALS
WEIGHT: 234 LBS | SYSTOLIC BLOOD PRESSURE: 109 MMHG | HEART RATE: 95 BPM | BODY MASS INDEX: 41.46 KG/M2 | RESPIRATION RATE: 20 BRPM | OXYGEN SATURATION: 96 % | DIASTOLIC BLOOD PRESSURE: 71 MMHG | TEMPERATURE: 98.2 F | HEIGHT: 63 IN

## 2019-10-27 DIAGNOSIS — B96.89 BV (BACTERIAL VAGINOSIS): ICD-10-CM

## 2019-10-27 DIAGNOSIS — O20.0 THREATENED MISCARRIAGE IN EARLY PREGNANCY: ICD-10-CM

## 2019-10-27 DIAGNOSIS — N76.0 BV (BACTERIAL VAGINOSIS): ICD-10-CM

## 2019-10-27 DIAGNOSIS — Z32.01 PREGNANCY TEST PERFORMED, PREGNANCY CONFIRMED: Primary | ICD-10-CM

## 2019-10-27 DIAGNOSIS — J06.9 ACUTE UPPER RESPIRATORY INFECTION: ICD-10-CM

## 2019-10-27 LAB
ALBUMIN SERPL-MCNC: 3.1 G/DL (ref 3.4–5)
ALBUMIN/GLOB SERPL: 0.7 {RATIO} (ref 0.8–1.7)
ALP SERPL-CCNC: 62 U/L (ref 45–117)
ALT SERPL-CCNC: 18 U/L (ref 13–56)
ANION GAP SERPL CALC-SCNC: 5 MMOL/L (ref 3–18)
APPEARANCE UR: CLEAR
AST SERPL-CCNC: 11 U/L (ref 10–38)
BACTERIA URNS QL MICRO: ABNORMAL /HPF
BASOPHILS # BLD: 0 K/UL (ref 0–0.1)
BASOPHILS NFR BLD: 0 % (ref 0–2)
BILIRUB SERPL-MCNC: 0.3 MG/DL (ref 0.2–1)
BILIRUB UR QL: NEGATIVE
BUN SERPL-MCNC: 11 MG/DL (ref 7–18)
BUN/CREAT SERPL: 18 (ref 12–20)
CALCIUM SERPL-MCNC: 8.4 MG/DL (ref 8.5–10.1)
CHLORIDE SERPL-SCNC: 104 MMOL/L (ref 100–111)
CO2 SERPL-SCNC: 28 MMOL/L (ref 21–32)
COLOR UR: YELLOW
CREAT SERPL-MCNC: 0.61 MG/DL (ref 0.6–1.3)
DIFFERENTIAL METHOD BLD: ABNORMAL
EOSINOPHIL # BLD: 0.2 K/UL (ref 0–0.4)
EOSINOPHIL NFR BLD: 2 % (ref 0–5)
ERYTHROCYTE [DISTWIDTH] IN BLOOD BY AUTOMATED COUNT: 13.8 % (ref 11.6–14.5)
GLOBULIN SER CALC-MCNC: 4.2 G/DL (ref 2–4)
GLUCOSE SERPL-MCNC: 141 MG/DL (ref 74–99)
GLUCOSE UR STRIP.AUTO-MCNC: NEGATIVE MG/DL
HCG SERPL QL: POSITIVE
HCG SERPL-ACNC: 51 MIU/ML (ref 0–10)
HCT VFR BLD AUTO: 35.6 % (ref 35–45)
HGB BLD-MCNC: 12.1 G/DL (ref 12–16)
HGB UR QL STRIP: NEGATIVE
KETONES UR QL STRIP.AUTO: NEGATIVE MG/DL
LEUKOCYTE ESTERASE UR QL STRIP.AUTO: ABNORMAL
LYMPHOCYTES # BLD: 2.6 K/UL (ref 0.9–3.6)
LYMPHOCYTES NFR BLD: 26 % (ref 21–52)
MCH RBC QN AUTO: 28.1 PG (ref 24–34)
MCHC RBC AUTO-ENTMCNC: 34 G/DL (ref 31–37)
MCV RBC AUTO: 82.6 FL (ref 74–97)
MONOCYTES # BLD: 0.6 K/UL (ref 0.05–1.2)
MONOCYTES NFR BLD: 6 % (ref 3–10)
NEUTS SEG # BLD: 6.5 K/UL (ref 1.8–8)
NEUTS SEG NFR BLD: 66 % (ref 40–73)
NITRITE UR QL STRIP.AUTO: NEGATIVE
PH UR STRIP: 8 [PH] (ref 5–8)
PLATELET # BLD AUTO: 308 K/UL (ref 135–420)
PMV BLD AUTO: 8.7 FL (ref 9.2–11.8)
POTASSIUM SERPL-SCNC: 3.6 MMOL/L (ref 3.5–5.5)
PROT SERPL-MCNC: 7.3 G/DL (ref 6.4–8.2)
PROT UR STRIP-MCNC: NEGATIVE MG/DL
RBC # BLD AUTO: 4.31 M/UL (ref 4.2–5.3)
SERVICE CMNT-IMP: NORMAL
SODIUM SERPL-SCNC: 137 MMOL/L (ref 136–145)
SP GR UR REFRACTOMETRY: 1.01 (ref 1–1.03)
T4 FREE SERPL-MCNC: 1 NG/DL (ref 0.7–1.5)
TROPONIN I SERPL-MCNC: <0.02 NG/ML (ref 0–0.04)
TSH SERPL DL<=0.05 MIU/L-ACNC: 1.55 UIU/ML (ref 0.36–3.74)
UROBILINOGEN UR QL STRIP.AUTO: 0.2 EU/DL (ref 0.2–1)
WBC # BLD AUTO: 9.9 K/UL (ref 4.6–13.2)
WBC URNS QL MICRO: ABNORMAL /HPF (ref 0–4)
WET PREP GENITAL: NORMAL

## 2019-10-27 PROCEDURE — 93975 VASCULAR STUDY: CPT

## 2019-10-27 PROCEDURE — 99285 EMERGENCY DEPT VISIT HI MDM: CPT

## 2019-10-27 PROCEDURE — 74011250636 HC RX REV CODE- 250/636: Performed by: EMERGENCY MEDICINE

## 2019-10-27 PROCEDURE — 84443 ASSAY THYROID STIM HORMONE: CPT

## 2019-10-27 PROCEDURE — 84439 ASSAY OF FREE THYROXINE: CPT

## 2019-10-27 PROCEDURE — 84484 ASSAY OF TROPONIN QUANT: CPT

## 2019-10-27 PROCEDURE — 87210 SMEAR WET MOUNT SALINE/INK: CPT

## 2019-10-27 PROCEDURE — 87491 CHLMYD TRACH DNA AMP PROBE: CPT

## 2019-10-27 PROCEDURE — 84702 CHORIONIC GONADOTROPIN TEST: CPT

## 2019-10-27 PROCEDURE — 84703 CHORIONIC GONADOTROPIN ASSAY: CPT

## 2019-10-27 PROCEDURE — 85025 COMPLETE CBC W/AUTO DIFF WBC: CPT

## 2019-10-27 PROCEDURE — 93005 ELECTROCARDIOGRAM TRACING: CPT

## 2019-10-27 PROCEDURE — 80053 COMPREHEN METABOLIC PANEL: CPT

## 2019-10-27 PROCEDURE — 81001 URINALYSIS AUTO W/SCOPE: CPT

## 2019-10-27 PROCEDURE — 87086 URINE CULTURE/COLONY COUNT: CPT

## 2019-10-27 RX ORDER — VAPORIZER
1 EACH MISCELLANEOUS
Qty: 1 EACH | Refills: 0 | OUTPATIENT
Start: 2019-10-27 | End: 2019-11-10

## 2019-10-27 RX ORDER — METRONIDAZOLE 500 MG/1
500 TABLET ORAL 2 TIMES DAILY
Qty: 14 TAB | Refills: 0 | Status: SHIPPED | OUTPATIENT
Start: 2019-10-27 | End: 2019-11-03

## 2019-10-27 RX ORDER — METOCLOPRAMIDE 10 MG/1
10 TABLET ORAL
Qty: 12 TAB | Refills: 0 | Status: SHIPPED | OUTPATIENT
Start: 2019-10-27 | End: 2019-11-06

## 2019-10-27 RX ADMIN — SODIUM CHLORIDE 1000 ML: 900 INJECTION, SOLUTION INTRAVENOUS at 10:01

## 2019-10-27 NOTE — DISCHARGE INSTRUCTIONS
Patient Education        Threatened Miscarriage: Care Instructions  Your Care Instructions    Some women have light spotting or bleeding during the first 12 weeks of pregnancy. In some cases this is normal. Light spotting or bleeding can also be a sign of a possible loss of the pregnancy. This is called a threatened miscarriage. At this point, the doctor may not be able to tell if your vaginal bleeding is normal or is a sign of a miscarriage. In early pregnancy, things such as stress, exercise, and sex do not cause miscarriage. You may be worried or upset about the possibility of losing your pregnancy. But do not blame yourself. There is no treatment to stop a threatened miscarriage. If you do have a miscarriage, there was nothing you could have done to prevent it. A miscarriage usually means that the pregnancy is not developing normally. The doctor has checked you carefully, but problems can develop later. If you notice any problems or new symptoms, get medical treatment right away. Follow-up care is a key part of your treatment and safety. Be sure to make and go to all appointments, and call your doctor if you are having problems. It's also a good idea to know your test results and keep a list of the medicines you take. How can you care for yourself at home? · If you do have a miscarriage, you will probably have some vaginal bleeding for 1 to 2 weeks. Use pads instead of tampons. · Take acetaminophen (Tylenol) for cramps. Read and follow all instructions on the label. · Do not take two or more pain medicines at the same time unless the doctor told you to. Many pain medicines have acetaminophen, which is Tylenol. Too much acetaminophen (Tylenol) can be harmful. · Do not have sex until your doctor says it is okay. · Get lots of rest over the next several days. · You may do your normal activities if you feel well enough to do them. But do not do any heavy exercise until your doctor says it is okay.   · Eat a balanced diet that is high in iron and vitamin C. Foods rich in iron include red meat, shellfish, eggs, beans, and leafy green vegetables. Foods high in vitamin C include citrus fruits, tomatoes, and broccoli. Talk to your doctor about whether you need to take iron pills or a multivitamin. · Do not drink alcohol or use tobacco or illegal drugs. · Do not smoke. If you need help quitting, talk to your doctor about stop-smoking programs and medicines. These can increase your chances of quitting for good. When should you call for help? Call 911 anytime you think you may need emergency care. For example, call if:    · You passed out (lost consciousness).    Call your doctor now or seek immediate medical care if:    · You have severe vaginal bleeding.     · You are dizzy or lightheaded, or you feel like you may faint.     · You have new or worse pain in your belly or pelvis.     · You have a fever.     · You have vaginal discharge that smells bad.    Watch closely for changes in your health, and be sure to contact your doctor if:    · You do not get better as expected. Where can you learn more? Go to http://matt-rashida.info/. Enter G193 in the search box to learn more about \"Threatened Miscarriage: Care Instructions. \"  Current as of: May 29, 2019  Content Version: 12.2  © 0982-3870 [employer]. Care instructions adapted under license by [employer] (which disclaims liability or warranty for this information). If you have questions about a medical condition or this instruction, always ask your healthcare professional. [place] [address] any warranty or liability for your use of this information. Patient Education        Bacterial Vaginosis: Care Instructions  Your Care Instructions    Bacterial vaginosis is a type of vaginal infection. It is caused by excess growth of certain bacteria that are normally found in the vagina.  Symptoms can include itching, swelling, pain when you urinate or have sex, and a gray or yellow discharge with a \"fishy\" odor. It is not considered an infection that is spread through sexual contact. Although symptoms can be annoying and uncomfortable, bacterial vaginosis does not usually cause other health problems. However, if you have it while you are pregnant, it can cause complications. While the infection may go away on its own, most doctors use antibiotics to treat it. You may have been prescribed pills or vaginal cream. With treatment, bacterial vaginosis usually clears up in 5 to 7 days. Follow-up care is a key part of your treatment and safety. Be sure to make and go to all appointments, and call your doctor if you are having problems. It's also a good idea to know your test results and keep a list of the medicines you take. How can you care for yourself at home? · Take your antibiotics as directed. Do not stop taking them just because you feel better. You need to take the full course of antibiotics. · Do not eat or drink anything that contains alcohol if you are taking metronidazole (Flagyl). · Keep using your medicine if you start your period. Use pads instead of tampons while using a vaginal cream or suppository. Tampons can absorb the medicine. · Wear loose cotton clothing. Do not wear nylon and other materials that hold body heat and moisture close to the skin. · Do not scratch. Relieve itching with a cold pack or a cool bath. · Do not wash your vaginal area more than once a day. Use plain water or a mild, unscented soap. Do not douche. When should you call for help? Watch closely for changes in your health, and be sure to contact your doctor if:    · You have unexpected vaginal bleeding.     · You have a fever.     · You have new or increased pain in your vagina or pelvis.     · You are not getting better after 1 week.     · Your symptoms return after you finish the course of your medicine.    Where can you learn more? Go to http://matt-rashida.info/. Home Vignesh in the search box to learn more about \"Bacterial Vaginosis: Care Instructions. \"  Current as of: February 19, 2019  Content Version: 12.2  © 0616-9981 [employer]. Care instructions adapted under license by [employer] (which disclaims liability or warranty for this information). If you have questions about a medical condition or this instruction, always ask your healthcare professional. [place] [address] any warranty or liability for your use of this information. Patient Education        Upper Respiratory Infection (Cold): Care Instructions  Your Care Instructions    An upper respiratory infection, or URI, is an infection of the nose, sinuses, or throat. URIs are spread by coughs, sneezes, and direct contact. The common cold is the most frequent kind of URI. The flu and sinus infections are other kinds of URIs. Almost all URIs are caused by viruses. Antibiotics won't cure them. But you can treat most infections with home care. This may include drinking lots of fluids and taking over-the-counter pain medicine. You will probably feel better in 4 to 10 days. The doctor has checked you carefully, but problems can develop later. If you notice any problems or new symptoms, get medical treatment right away. Follow-up care is a key part of your treatment and safety. Be sure to make and go to all appointments, and call your doctor if you are having problems. It's also a good idea to know your test results and keep a list of the medicines you take. How can you care for yourself at home? · To prevent dehydration, drink plenty of fluids, enough so that your urine is light yellow or clear like water. Choose water and other caffeine-free clear liquids until you feel better.  If you have kidney, heart, or liver disease and have to limit fluids, talk with your doctor before you increase the amount of fluids you drink. · Take an over-the-counter pain medicine, such as acetaminophen (Tylenol), ibuprofen (Advil, Motrin), or naproxen (Aleve). Read and follow all instructions on the label. · Before you use cough and cold medicines, check the label. These medicines may not be safe for young children or for people with certain health problems. · Be careful when taking over-the-counter cold or flu medicines and Tylenol at the same time. Many of these medicines have acetaminophen, which is Tylenol. Read the labels to make sure that you are not taking more than the recommended dose. Too much acetaminophen (Tylenol) can be harmful. · Get plenty of rest.  · Do not smoke or allow others to smoke around you. If you need help quitting, talk to your doctor about stop-smoking programs and medicines. These can increase your chances of quitting for good. When should you call for help? Call 911 anytime you think you may need emergency care. For example, call if:    · You have severe trouble breathing.    Call your doctor now or seek immediate medical care if:    · You seem to be getting much sicker.     · You have new or worse trouble breathing.     · You have a new or higher fever.     · You have a new rash.    Watch closely for changes in your health, and be sure to contact your doctor if:    · You have a new symptom, such as a sore throat, an earache, or sinus pain.     · You cough more deeply or more often, especially if you notice more mucus or a change in the color of your mucus.     · You do not get better as expected. Where can you learn more? Go to http://matt-rashida.info/. Enter G276 in the search box to learn more about \"Upper Respiratory Infection (Cold): Care Instructions. \"  Current as of: June 9, 2019  Content Version: 12.2  © 7128-8722 [employer]. Care instructions adapted under license by [employer] (which disclaims liability or warranty for this information). If you have questions about a medical condition or this instruction, always ask your healthcare professional. [place] [address] any warranty or liability for your use of this information.

## 2019-10-27 NOTE — ED PROVIDER NOTES
EMERGENCY DEPARTMENT HISTORY AND PHYSICAL EXAM    Date: 10/27/2019  Patient Name: Karey Worley    History of Presenting Illness     Chief Complaint   Patient presents with    Cough    Sore Throat         History Provided By: Patient      Additional History (Context): Karey Worley is a 26 y.o. female with PMH of Anxiety and PCOS who presents with cough, sore throat, and congestion x1 week. Pt. States that she now has a cough w/ green-brown production x4 days. She is feeling SOB the more that she coughs. She is not able to rest comfortably at night. Pt. Has attempted to remedy symptoms w/ cough drops and mucinex but had no relief. She noted that she is on a weight loss program that has her on medications such as Pheniramine and Metformin. Last Dose of Pheniramine was 0500 yesterday. Denies CP, wheezing, fever, changes in vision/hearing/BM, urinary frequency/burning, n/v.     PCP: Yessi Danielle NP    Current Outpatient Medications   Medication Sig Dispense Refill    prenatal multivit-ca-min-fe-fa (PRENATAL VITAMIN) tab Take 1 Tab by mouth daily. 30 Tab 0    metoclopramide HCl (REGLAN) 10 mg tablet Take 1 Tab by mouth every six (6) hours as needed for Nausea for up to 10 days. 12 Tab 0    Camphor-Eucalyptus Oil-Menthol (VICKS VAPORUB) 4.8-1.2-2.6 % oint 1 Actuation(s) by Apply Externally route three (3) times daily as needed for Cough or Other (congestion). 50 g 0    camphor-eucalyptus-menthol (VICKS VAPOSTEAM) liqd 1 Actuation(s) by Does Not Apply route three (3) times daily as needed for Cough or Other (congestion). 1 Bottle 0    Vaporizers ([employer] WARM STEAM VAPORIZER) misc 1 Actuation(s) by Does Not Apply route three (3) times daily as needed for Cough or Other (congestion). 1 Each 0    metroNIDAZOLE (FLAGYL) 500 mg tablet Take 1 Tab by mouth two (2) times a day for 7 days. 14 Tab 0    diphenhydrAMINE (BENADRYL) 25 mg capsule Take 50 mg by mouth every six (6) hours as needed. Past History     Past Medical History:  Past Medical History:   Diagnosis Date    Anxiety     Gestational diabetes     History of PCOS        Past Surgical History:  Past Surgical History:   Procedure Laterality Date    [address]  [address]CTION  , ,        Family History:  Family History   Problem Relation Age of Onset    Cancer Paternal Grandfather         lung- smoker    Cancer Paternal Grandmother         unknown    Cancer Mother         breast- unknown age       Social History:  Social History     Tobacco Use    Smoking status: Never Smoker    Smokeless tobacco: Never Used   Substance Use Topics    Alcohol use: Yes     Comment: rarely    Drug use: No       Allergies: Allergies   Allergen Reactions    Vicodin [Hydrocodone-Acetaminophen] Anxiety         Review of Systems   Review of Systems   Constitutional: Negative for appetite change, chills, diaphoresis, fatigue and fever. HENT: Positive for congestion, facial swelling, postnasal drip, rhinorrhea, sneezing and sore throat. Negative for drooling, ear discharge, ear pain, hearing loss, nosebleeds, sinus pressure, sinus pain, tinnitus, trouble swallowing and voice change. Eyes: Negative for photophobia, pain and visual disturbance. Respiratory: Positive for cough and shortness of breath. Negative for choking, chest tightness, wheezing and stridor. Cardiovascular: Negative for chest pain and leg swelling. Gastrointestinal: Negative for abdominal pain, constipation, diarrhea, nausea and vomiting. Genitourinary: Negative for difficulty urinating, dysuria, enuresis, flank pain, frequency, hematuria and pelvic pain. Musculoskeletal: Negative for arthralgias, back pain, gait problem, joint swelling, neck pain and neck stiffness. Skin: Negative for color change, pallor and rash. Neurological: Positive for headaches.  Negative for dizziness, tremors, syncope, facial asymmetry, speech difficulty, weakness, light-headedness and numbness. Hematological: Positive for adenopathy. Submandibular LN   Psychiatric/Behavioral: Negative for confusion and suicidal ideas. All Other Systems Negative  Physical Exam     Vitals:    10/27/19 1030 10/27/19 1042 10/27/19 1230 10/27/19 1300   BP: 102/62  102/59 105/61   Pulse: 98 94 99 94   Resp: 18  17 18   Temp:       SpO2: 99%  100% 99%   Weight:       Height:         Physical Exam   Constitutional: She is oriented to person, place, and time. She appears well-developed and well-nourished. No distress. HENT:   Head: Normocephalic and atraumatic. Mouth/Throat: Oropharynx is clear and moist. No oropharyngeal exudate. Eyes: Pupils are equal, round, and reactive to light. Conjunctivae are normal. Right eye exhibits no discharge. Left eye exhibits no discharge. No scleral icterus. Neck: Normal range of motion. Neck supple. No JVD present. No tracheal deviation present. No thyromegaly present. Cardiovascular: Regular rhythm, normal heart sounds and normal pulses. Tachycardia present. No murmur heard. Pulmonary/Chest: Effort normal and breath sounds normal. No respiratory distress. She has no wheezes. She has no rales. She exhibits tenderness. CostochondralJxn TTP   Abdominal: Soft. Bowel sounds are normal. There is no tenderness. There is no rebound and no guarding. Musculoskeletal: Normal range of motion. She exhibits no edema, tenderness or deformity. Neurological: She is alert and oriented to person, place, and time. Skin: Skin is warm and dry. No rash noted. She is not diaphoretic. No erythema. No pallor. Psychiatric: She has a normal mood and affect.  Her behavior is normal. Thought content normal.          Diagnostic Study Results     Labs -     Recent Results (from the past 12 hour(s))   EKG, 12 LEAD, INITIAL    Collection Time: 10/27/19  9:06 AM   Result Value Ref Range    Ventricular Rate 128 BPM    Atrial Rate 128 BPM    P-R Interval 124 ms    QRS Duration 74 ms Q-T Interval 310 ms    QTC Calculation (Bezet) 452 ms    Calculated P Axis 43 degrees    Calculated R Axis 86 degrees    Calculated T Axis 25 degrees    Diagnosis       Sinus tachycardia  Otherwise normal ECG  When compared with ECG of 06-JUL-2017 00:37,  No significant change was found     CBC WITH AUTOMATED DIFF    Collection Time: 10/27/19  9:54 AM   Result Value Ref Range    WBC 9.9 4.6 - 13.2 K/uL    RBC 4.31 4.20 - 5.30 M/uL    HGB 12.1 12.0 - 16.0 g/dL    HCT 35.6 35.0 - 45.0 %    MCV 82.6 74.0 - 97.0 FL    MCH 28.1 24.0 - 34.0 PG    MCHC 34.0 31.0 - 37.0 g/dL    RDW 13.8 11.6 - 14.5 %    PLATELET 241 327 - 361 K/uL    MPV 8.7 (L) 9.2 - 11.8 FL    NEUTROPHILS 66 40 - 73 %    LYMPHOCYTES 26 21 - 52 %    MONOCYTES 6 3 - 10 %    EOSINOPHILS 2 0 - 5 %    BASOPHILS 0 0 - 2 %    ABS. NEUTROPHILS 6.5 1.8 - 8.0 K/UL    ABS. LYMPHOCYTES 2.6 0.9 - 3.6 K/UL    ABS. MONOCYTES 0.6 0.05 - 1.2 K/UL    ABS. EOSINOPHILS 0.2 0.0 - 0.4 K/UL    ABS. BASOPHILS 0.0 0.0 - 0.1 K/UL    DF AUTOMATED     METABOLIC PANEL, COMPREHENSIVE    Collection Time: 10/27/19  9:54 AM   Result Value Ref Range    Sodium 137 136 - 145 mmol/L    Potassium 3.6 3.5 - 5.5 mmol/L    Chloride 104 100 - 111 mmol/L    CO2 28 21 - 32 mmol/L    Anion gap 5 3.0 - 18 mmol/L    Glucose 141 (H) 74 - 99 mg/dL    BUN 11 7.0 - 18 MG/DL    Creatinine 0.61 0.6 - 1.3 MG/DL    BUN/Creatinine ratio 18 12 - 20      GFR est AA >60 >60 ml/min/1.73m2    GFR est non-AA >60 >60 ml/min/1.73m2    Calcium 8.4 (L) 8.5 - 10.1 MG/DL    Bilirubin, total 0.3 0.2 - 1.0 MG/DL    ALT (SGPT) 18 13 - 56 U/L    AST (SGOT) 11 10 - 38 U/L    Alk.  phosphatase 62 45 - 117 U/L    Protein, total 7.3 6.4 - 8.2 g/dL    Albumin 3.1 (L) 3.4 - 5.0 g/dL    Globulin 4.2 (H) 2.0 - 4.0 g/dL    A-G Ratio 0.7 (L) 0.8 - 1.7     TSH 3RD GENERATION    Collection Time: 10/27/19  9:54 AM   Result Value Ref Range    TSH 1.55 0.36 - 3.74 uIU/mL   T4, FREE    Collection Time: 10/27/19  9:54 AM   Result Value Ref Range    T4, Free 1.0 0.7 - 1.5 NG/DL   TROPONIN I    Collection Time: 10/27/19  9:54 AM   Result Value Ref Range    Troponin-I, QT <0.02 0.0 - 0.045 NG/ML   HCG QL SERUM    Collection Time: 10/27/19  9:54 AM   Result Value Ref Range    HCG, Ql. POSITIVE (A) NEG     BETA HCG, QT    Collection Time: 10/27/19  9:54 AM   Result Value Ref Range    Beta HCG, QT 51 (H) 0 - 10 MIU/ML   URINALYSIS W/ RFLX MICROSCOPIC    Collection Time: 10/27/19 12:10 PM   Result Value Ref Range    Color YELLOW      Appearance CLEAR      Specific gravity 1.014 1.005 - 1.030      pH (UA) 8.0 5.0 - 8.0      Protein NEGATIVE  NEG mg/dL    Glucose NEGATIVE  NEG mg/dL    Ketone NEGATIVE  NEG mg/dL    Bilirubin NEGATIVE  NEG      Blood NEGATIVE  NEG      Urobilinogen 0.2 0.2 - 1.0 EU/dL    Nitrites NEGATIVE  NEG      Leukocyte Esterase TRACE (A) NEG     URINE MICROSCOPIC ONLY    Collection Time: 10/27/19 12:10 PM   Result Value Ref Range    WBC 0 to 3 0 - 4 /hpf    Bacteria FEW (A) NEG /hpf   WET PREP    Collection Time: 10/27/19 12:20 PM   Result Value Ref Range    Special Requests: NO SPECIAL REQUESTS      Wet prep RARE  CLUE CELLS PRESENT        Wet prep NO TRICHOMONAS SEEN      Wet prep NO YEAST SEEN         Radiologic Studies -   US TRANSVAGINAL W DOPPLER   Final Result   IMPRESSION:      No identifiable intrauterine gestation, nor would be expected given beta hCG   levels. This may represent an early pregnancy, missed  or ectopic. Left corpus luteum. CT Results  (Last 48 hours)    None        CXR Results  (Last 48 hours)    None            Medical Decision Making   I am the first provider for this patient. I reviewed the vital signs, available nursing notes, past medical history, past surgical history, family history and social history. Alerted pt. Of pelvic exam and US results. Pelvic exam showed results consistent with Bacterial Vaginosis. Pt. Advised to take flagyl in its entirety to cure this infection.  Pt. Agreed. Pt. Was also informed that d/t to early pregnancy detection, her US results were as follows: No identifiable intrauterine gestation, nor would be expected given beta hCG  levels. This may represent an early pregnancy, missed  or ectopic. D/t her RF of tachycardia, recent weight loss attempt/medications, she is considered a high risk pregnancy. I reviewed this with her and encouraged her to follow up w/ her provider. Patient has a history of anxiety. She initially presented with tachycardia but with IV fluids and a time to be calm, her heart rate came down and was maintained below 100 and do not suspect any kind of PE associated with her URI symptoms of shortness of breath and coughing. This will treat with just symptomatic relief medications considering her pregnancy diagnosis. She was advised to obtain new blood test and pelvic ultrasound to confirm healthy fetal development. Pt. understood and agreed with Verbal recommendations made. She also was presented with pelvic rest precautions including fluids no intercourse avoiding heavy lifting patient verbalized she understood. Return precautions also reviewed with patient including pain not controlled with Tylenol fever bleeding greater than a pad an hour. Vital Signs-Reviewed the patient's vital signs. Procedures:  Pelvic Exam  Date/Time: 10/27/2019 12:28 PM  Performed by: student  Procedure duration:  5 minutes. Documented by:  HYACINTH Gibbons. Exam assisted by:  Pedro Goff PA-C. Type of exam performed: bimanual and speculum. External genitalia appearance: normal.    Vaginal exam:  normal and odor. Cervical exam:  normal and discharge from cervix. Specimen(s) collected:  chlamydia and GC. Bimanual exam:  normal.    Patient tolerance: Patient tolerated the procedure well with no immediate complications          Provider Notes (Medical Decision Making): Patient's blood type is Rh O+.     MED RECONCILIATION:  No current facility-administered medications for this encounter. Current Outpatient Medications   Medication Sig    prenatal multivit-ca-min-fe-fa (PRENATAL VITAMIN) tab Take 1 Tab by mouth daily.  metoclopramide HCl (REGLAN) 10 mg tablet Take 1 Tab by mouth every six (6) hours as needed for Nausea for up to 10 days.  Camphor-Eucalyptus Oil-Menthol (VICKS VAPORUB) 4.8-1.2-2.6 % oint 1 Actuation(s) by Apply Externally route three (3) times daily as needed for Cough or Other (congestion).  camphor-eucalyptus-menthol (VICKS VAPOSTEAM) liqd 1 Actuation(s) by Does Not Apply route three (3) times daily as needed for Cough or Other (congestion).  Vaporizers ([employer] WARM STEAM VAPORIZER) misc 1 Actuation(s) by Does Not Apply route three (3) times daily as needed for Cough or Other (congestion).  metroNIDAZOLE (FLAGYL) 500 mg tablet Take 1 Tab by mouth two (2) times a day for 7 days.  diphenhydrAMINE (BENADRYL) 25 mg capsule Take 50 mg by mouth every six (6) hours as needed. Disposition:  home    DISCHARGE NOTE:   1:43 PM    Pt has been reexamined. Patient has no new complaints, changes, or physical findings. Care plan outlined and precautions discussed. Results of labs, US were reviewed with the patient. All medications were reviewed with the patient; will d/c home with PNV, flagyl, vicks. All of pt's questions and concerns were addressed. Patient was instructed and agrees to follow up with OB, as well as to return to the ED upon further deterioration. Patient is ready to go home.     Follow-up Information     Follow up With Specialties Details Why Contact Info    Renu Purcell MD Obstetrics & Gynecology Schedule an appointment as soon as possible for a visit in 1 day  [address] Matthias Ramos NP Nurse Practitioner Schedule an appointment as soon as possible for a visit in 1 day  [address] [facility] [address] [facility] [address] [place]      [facility] EMERGENCY DEPT Emergency Medicine  If symptoms worsen return immediately [address]          Current Discharge Medication List      START taking these medications    Details   prenatal multivit-ca-min-fe-fa (PRENATAL VITAMIN) tab Take 1 Tab by mouth daily. Qty: 30 Tab, Refills: 0      metoclopramide HCl (REGLAN) 10 mg tablet Take 1 Tab by mouth every six (6) hours as needed for Nausea for up to 10 days. Qty: 12 Tab, Refills: 0      Camphor-Eucalyptus Oil-Menthol (VICKS VAPORUB) 4.8-1.2-2.6 % oint 1 Actuation(s) by Apply Externally route three (3) times daily as needed for Cough or Other (congestion). Qty: 50 g, Refills: 0      camphor-eucalyptus-menthol (VICKS VAPOSTEAM) liqd 1 Actuation(s) by Does Not Apply route three (3) times daily as needed for Cough or Other (congestion). Qty: 1 Bottle, Refills: 0      Vaporizers ([employer] WARM STEAM VAPORIZER) misc 1 Actuation(s) by Does Not Apply route three (3) times daily as needed for Cough or Other (congestion). Qty: 1 Each, Refills: 0      metroNIDAZOLE (FLAGYL) 500 mg tablet Take 1 Tab by mouth two (2) times a day for 7 days. Qty: 14 Tab, Refills: 0             Diagnosis     Clinical Impression:   1. Pregnancy test performed, pregnancy confirmed    2. Threatened miscarriage in early pregnancy    3. Acute upper respiratory infection    4.  BV (bacterial vaginosis)

## 2019-10-28 LAB
ATRIAL RATE: 128 BPM
C TRACH RRNA SPEC QL NAA+PROBE: NEGATIVE
CALCULATED P AXIS, ECG09: 43 DEGREES
CALCULATED R AXIS, ECG10: 86 DEGREES
CALCULATED T AXIS, ECG11: 25 DEGREES
DIAGNOSIS, 93000: NORMAL
N GONORRHOEA RRNA SPEC QL NAA+PROBE: NEGATIVE
P-R INTERVAL, ECG05: 124 MS
Q-T INTERVAL, ECG07: 310 MS
QRS DURATION, ECG06: 74 MS
QTC CALCULATION (BEZET), ECG08: 452 MS
SPECIMEN SOURCE: NORMAL
VENTRICULAR RATE, ECG03: 128 BPM

## 2019-10-29 LAB
BACTERIA SPEC CULT: NORMAL
SERVICE CMNT-IMP: NORMAL

## 2019-11-10 ENCOUNTER — HOSPITAL ENCOUNTER (EMERGENCY)
Age: 29
Discharge: HOME | End: 2019-11-10
Attending: EMERGENCY MEDICINE
Payer: MEDICAID

## 2019-11-10 ENCOUNTER — HOSPITAL ENCOUNTER (EMERGENCY)
Dept: ULTRASOUND IMAGING | Age: 29
Discharge: HOME | End: 2019-11-10
Attending: EMERGENCY MEDICINE
Payer: MEDICAID

## 2019-11-10 VITALS
SYSTOLIC BLOOD PRESSURE: 133 MMHG | OXYGEN SATURATION: 98 % | WEIGHT: 230 LBS | RESPIRATION RATE: 18 BRPM | TEMPERATURE: 97.8 F | HEIGHT: 63 IN | HEART RATE: 95 BPM | BODY MASS INDEX: 40.75 KG/M2 | DIASTOLIC BLOOD PRESSURE: 95 MMHG

## 2019-11-10 DIAGNOSIS — O03.9 SAB (SPONTANEOUS ABORTION): Primary | ICD-10-CM

## 2019-11-10 LAB
ALBUMIN SERPL-MCNC: 3.3 G/DL (ref 3.4–5)
ALBUMIN/GLOB SERPL: 0.8 {RATIO} (ref 0.8–1.7)
ALP SERPL-CCNC: 59 U/L (ref 45–117)
ALT SERPL-CCNC: 26 U/L (ref 13–56)
ANION GAP SERPL CALC-SCNC: 7 MMOL/L (ref 3–18)
APPEARANCE UR: CLEAR
APTT PPP: 28.8 SEC (ref 23–36.4)
AST SERPL-CCNC: 16 U/L (ref 10–38)
BASOPHILS # BLD: 0 K/UL (ref 0–0.1)
BASOPHILS NFR BLD: 0 % (ref 0–2)
BILIRUB SERPL-MCNC: 0.2 MG/DL (ref 0.2–1)
BILIRUB UR QL: NEGATIVE
BUN SERPL-MCNC: 5 MG/DL (ref 7–18)
BUN/CREAT SERPL: 9 (ref 12–20)
CALCIUM SERPL-MCNC: 8.4 MG/DL (ref 8.5–10.1)
CHLORIDE SERPL-SCNC: 106 MMOL/L (ref 100–111)
CO2 SERPL-SCNC: 27 MMOL/L (ref 21–32)
COLOR UR: YELLOW
CREAT SERPL-MCNC: 0.55 MG/DL (ref 0.6–1.3)
DIFFERENTIAL METHOD BLD: ABNORMAL
EOSINOPHIL # BLD: 0.2 K/UL (ref 0–0.4)
EOSINOPHIL NFR BLD: 2 % (ref 0–5)
EPITH CASTS URNS QL MICRO: NORMAL /LPF (ref 0–5)
ERYTHROCYTE [DISTWIDTH] IN BLOOD BY AUTOMATED COUNT: 14 % (ref 11.6–14.5)
GLOBULIN SER CALC-MCNC: 4.2 G/DL (ref 2–4)
GLUCOSE SERPL-MCNC: 113 MG/DL (ref 74–99)
GLUCOSE UR STRIP.AUTO-MCNC: NEGATIVE MG/DL
HCG SERPL-ACNC: 8002 MIU/ML (ref 0–10)
HCT VFR BLD AUTO: 36.4 % (ref 35–45)
HGB BLD-MCNC: 12.2 G/DL (ref 12–16)
HGB UR QL STRIP: ABNORMAL
INR PPP: 1 (ref 0.8–1.2)
KETONES UR QL STRIP.AUTO: NEGATIVE MG/DL
LEUKOCYTE ESTERASE UR QL STRIP.AUTO: NEGATIVE
LYMPHOCYTES # BLD: 2.5 K/UL (ref 0.9–3.6)
LYMPHOCYTES NFR BLD: 31 % (ref 21–52)
MCH RBC QN AUTO: 28 PG (ref 24–34)
MCHC RBC AUTO-ENTMCNC: 33.5 G/DL (ref 31–37)
MCV RBC AUTO: 83.7 FL (ref 74–97)
MONOCYTES # BLD: 0.4 K/UL (ref 0.05–1.2)
MONOCYTES NFR BLD: 5 % (ref 3–10)
NEUTS SEG # BLD: 4.9 K/UL (ref 1.8–8)
NEUTS SEG NFR BLD: 62 % (ref 40–73)
NITRITE UR QL STRIP.AUTO: NEGATIVE
PH UR STRIP: 6.5 [PH] (ref 5–8)
PLATELET # BLD AUTO: 320 K/UL (ref 135–420)
PMV BLD AUTO: 9 FL (ref 9.2–11.8)
POTASSIUM SERPL-SCNC: 4.2 MMOL/L (ref 3.5–5.5)
PROT SERPL-MCNC: 7.5 G/DL (ref 6.4–8.2)
PROT UR STRIP-MCNC: NEGATIVE MG/DL
PROTHROMBIN TIME: 13.4 SEC (ref 11.5–15.2)
RBC # BLD AUTO: 4.35 M/UL (ref 4.2–5.3)
RBC #/AREA URNS HPF: NORMAL /HPF (ref 0–5)
SERVICE CMNT-IMP: NORMAL
SODIUM SERPL-SCNC: 140 MMOL/L (ref 136–145)
SP GR UR REFRACTOMETRY: <1.005 (ref 1–1.03)
UROBILINOGEN UR QL STRIP.AUTO: 0.2 EU/DL (ref 0.2–1)
WBC # BLD AUTO: 8.1 K/UL (ref 4.6–13.2)
WBC URNS QL MICRO: NORMAL /HPF (ref 0–4)
WET PREP GENITAL: NORMAL

## 2019-11-10 PROCEDURE — 85610 PROTHROMBIN TIME: CPT

## 2019-11-10 PROCEDURE — 85025 COMPLETE CBC W/AUTO DIFF WBC: CPT

## 2019-11-10 PROCEDURE — 87491 CHLMYD TRACH DNA AMP PROBE: CPT

## 2019-11-10 PROCEDURE — 76817 TRANSVAGINAL US OBSTETRIC: CPT

## 2019-11-10 PROCEDURE — 99284 EMERGENCY DEPT VISIT MOD MDM: CPT

## 2019-11-10 PROCEDURE — 80053 COMPREHEN METABOLIC PANEL: CPT

## 2019-11-10 PROCEDURE — 81001 URINALYSIS AUTO W/SCOPE: CPT

## 2019-11-10 PROCEDURE — 87210 SMEAR WET MOUNT SALINE/INK: CPT

## 2019-11-10 PROCEDURE — 85730 THROMBOPLASTIN TIME PARTIAL: CPT

## 2019-11-10 PROCEDURE — 84702 CHORIONIC GONADOTROPIN TEST: CPT

## 2019-11-10 PROCEDURE — 74011250637 HC RX REV CODE- 250/637: Performed by: EMERGENCY MEDICINE

## 2019-11-10 RX ORDER — ACETAMINOPHEN 325 MG/1
650 TABLET ORAL
Qty: 20 TAB | Refills: 0 | Status: SHIPPED | OUTPATIENT
Start: 2019-11-10 | End: 2021-07-20 | Stop reason: ALTCHOICE

## 2019-11-10 RX ORDER — ACETAMINOPHEN 325 MG/1
650 TABLET ORAL
Status: COMPLETED | OUTPATIENT
Start: 2019-11-10 | End: 2019-11-10

## 2019-11-10 RX ADMIN — ACETAMINOPHEN 650 MG: 325 TABLET ORAL at 14:01

## 2019-11-10 NOTE — ED PROVIDER NOTES
EMERGENCY DEPARTMENT HISTORY AND PHYSICAL EXAM    2:09 PM      Date: 11/10/2019  Patient Name: Anny Browning    History of Presenting Illness     Chief Complaint   Patient presents with    Pregnancy Problem         History Provided By: Patient  Location/Duration/Severity/Modifying factors   Patient is a 42-year-old female [address] [id number] the presents at 8 weeks by dates with vaginal bleeding that began this morning. Patient know she went to the bathroom had some cramping had some clots and since then has been having oozing vaginal bleeding. Patient admits that she has seen her OB/GYN as well as went to a family-planning clinic however has not made a decision about her pregnancy. Patient has not had a full OB/GYN evaluation however has had ultrasounds that had been noting that the baby is in the right place. Patient denies any fevers or chills and denies any medications that may have change the course of the pregnancy. Patient denies any trauma and denies any smoking or drinking. Patient has had miscarriages in the past but denies a history of an ectopic pregnancy. Patient denies any other aggravating or alleviating factors. Patient has been seen by her OB/GYN and noted that her hCG levels were rising reports that were given to her. PCP: Sulma Howard NP    Current Outpatient Medications   Medication Sig Dispense Refill    prenatal multivit-ca-min-fe-fa (PRENATAL VITAMIN) tab Take 1 Tab by mouth daily. 30 Tab 0    Camphor-Eucalyptus Oil-Menthol (VICKS VAPORUB) 4.8-1.2-2.6 % oint 1 Actuation(s) by Apply Externally route three (3) times daily as needed for Cough or Other (congestion). 50 g 0    camphor-eucalyptus-menthol (VICKS VAPOSTEAM) liqd 1 Actuation(s) by Does Not Apply route three (3) times daily as needed for Cough or Other (congestion).  1 Bottle 0    Vaporizers ([place] WARM STEAM VAPORIZER) misc 1 Actuation(s) by Does Not Apply route three (3) times daily as needed for Cough or Other (congestion). 1 Each 0    diphenhydrAMINE (BENADRYL) 25 mg capsule Take 50 mg by mouth every six (6) hours as needed. Past History     Past Medical History:  Past Medical History:   Diagnosis Date    Anxiety     Gestational diabetes     History of PCOS        Past Surgical History:  Past Surgical History:   Procedure Laterality Date    [de-identified]  SECTION  , ,        Family History:  Family History   Problem Relation Age of Onset    Cancer Paternal Grandfather         lung- smoker    Cancer Paternal Grandmother         unknown    Cancer Mother         breast- unknown age       Social History:  Social History     Tobacco Use    Smoking status: Never Smoker    Smokeless tobacco: Never Used   Substance Use Topics    Alcohol use: Yes     Comment: rarely    Drug use: No       Allergies: Allergies   Allergen Reactions    Vicodin [Hydrocodone-Acetaminophen] Anxiety         Review of Systems       Review of Systems   Constitutional: Negative for chills, fatigue, fever and unexpected weight change. HENT: Negative for congestion and rhinorrhea. Respiratory: Negative for chest tightness and shortness of breath. Cardiovascular: Negative for chest pain, palpitations and leg swelling. Gastrointestinal: Positive for abdominal pain. Negative for nausea and vomiting. Genitourinary: Positive for pelvic pain, vaginal bleeding and vaginal pain. Negative for dysuria. Musculoskeletal: Negative for back pain. Skin: Negative for rash. Neurological: Negative for dizziness and weakness. Psychiatric/Behavioral: The patient is not nervous/anxious. Physical Exam     Visit Vitals  BP (!) 152/101 (BP 1 Location: Left arm, BP Patient Position: At rest)   Pulse (!) 116   Temp 97.8 °F (36.6 °C)   Resp 12   Ht 5' 3\" (1.6 m)   Wt 104.3 kg (230 lb)   SpO2 100%   BMI 40.74 kg/m²         Physical Exam   Constitutional: She is oriented to person, place, and time.  She appears well-developed and well-nourished. No distress. HENT:   Head: Normocephalic and atraumatic. Right Ear: External ear normal.   Left Ear: External ear normal.   Nose: Nose normal.   Mouth/Throat: Oropharynx is clear and moist.   Eyes: Pupils are equal, round, and reactive to light. Conjunctivae and EOM are normal. No scleral icterus. Neck: Normal range of motion. Neck supple. No JVD present. No tracheal deviation present. No thyromegaly present. Cardiovascular: Normal rate, regular rhythm, normal heart sounds and intact distal pulses. Exam reveals no gallop and no friction rub. No murmur heard. Pulmonary/Chest: Effort normal and breath sounds normal. She exhibits no tenderness. Abdominal: Soft. Bowel sounds are normal. She exhibits no distension. There is tenderness. There is no rebound and no guarding. Mild suprapubic pain   Genitourinary:   Genitourinary Comments: Bleeding noted, loss closed, no tissue in the vaginal vault  Exam was chaperoned by Giorgi Almendarez RN   Musculoskeletal: Normal range of motion. She exhibits no edema or tenderness. Lymphadenopathy:     She has no cervical adenopathy. Neurological: She is alert and oriented to person, place, and time. No cranial nerve deficit. Coordination normal.   No sensory loss, Gait normal, Motor 5/5   Skin: Skin is warm and dry. Psychiatric: She has a normal mood and affect. Her behavior is normal. Judgment and thought content normal.   Good insight to care   Nursing note and vitals reviewed.         Diagnostic Study Results     Labs -  Recent Results (from the past 12 hour(s))   CBC WITH AUTOMATED DIFF    Collection Time: 11/10/19 11:45 AM   Result Value Ref Range    WBC 8.1 4.6 - 13.2 K/uL    RBC 4.35 4.20 - 5.30 M/uL    HGB 12.2 12.0 - 16.0 g/dL    HCT 36.4 35.0 - 45.0 %    MCV 83.7 74.0 - 97.0 FL    MCH 28.0 24.0 - 34.0 PG    MCHC 33.5 31.0 - 37.0 g/dL    RDW 14.0 11.6 - 14.5 %    PLATELET 598 759 - 249 K/uL    MPV 9.0 (L) 9.2 - 11.8 FL    NEUTROPHILS 62 40 - 73 % LYMPHOCYTES 31 21 - 52 %    MONOCYTES 5 3 - 10 %    EOSINOPHILS 2 0 - 5 %    BASOPHILS 0 0 - 2 %    ABS. NEUTROPHILS 4.9 1.8 - 8.0 K/UL    ABS. LYMPHOCYTES 2.5 0.9 - 3.6 K/UL    ABS. MONOCYTES 0.4 0.05 - 1.2 K/UL    ABS. EOSINOPHILS 0.2 0.0 - 0.4 K/UL    ABS. BASOPHILS 0.0 0.0 - 0.1 K/UL    DF AUTOMATED     METABOLIC PANEL, COMPREHENSIVE    Collection Time: 11/10/19 11:45 AM   Result Value Ref Range    Sodium 140 136 - 145 mmol/L    Potassium 4.2 3.5 - 5.5 mmol/L    Chloride 106 100 - 111 mmol/L    CO2 27 21 - 32 mmol/L    Anion gap 7 3.0 - 18 mmol/L    Glucose 113 (H) 74 - 99 mg/dL    BUN 5 (L) 7.0 - 18 MG/DL    Creatinine 0.55 (L) 0.6 - 1.3 MG/DL    BUN/Creatinine ratio 9 (L) 12 - 20      GFR est AA >60 >60 ml/min/1.73m2    GFR est non-AA >60 >60 ml/min/1.73m2    Calcium 8.4 (L) 8.5 - 10.1 MG/DL    Bilirubin, total 0.2 0.2 - 1.0 MG/DL    ALT (SGPT) 26 13 - 56 U/L    AST (SGOT) 16 10 - 38 U/L    Alk.  phosphatase 59 45 - 117 U/L    Protein, total 7.5 6.4 - 8.2 g/dL    Albumin 3.3 (L) 3.4 - 5.0 g/dL    Globulin 4.2 (H) 2.0 - 4.0 g/dL    A-G Ratio 0.8 0.8 - 1.7     PROTHROMBIN TIME + INR    Collection Time: 11/10/19 11:45 AM   Result Value Ref Range    Prothrombin time 13.4 11.5 - 15.2 sec    INR 1.0 0.8 - 1.2     PTT    Collection Time: 11/10/19 11:45 AM   Result Value Ref Range    aPTT 28.8 23.0 - 36.4 SEC   URINALYSIS W/ RFLX MICROSCOPIC    Collection Time: 11/10/19 11:45 AM   Result Value Ref Range    Color YELLOW      Appearance CLEAR      Specific gravity <1.005 (L) 1.005 - 1.030    pH (UA) 6.5 5.0 - 8.0      Protein NEGATIVE  NEG mg/dL    Glucose NEGATIVE  NEG mg/dL    Ketone NEGATIVE  NEG mg/dL    Bilirubin NEGATIVE  NEG      Blood LARGE (A) NEG      Urobilinogen 0.2 0.2 - 1.0 EU/dL    Nitrites NEGATIVE  NEG      Leukocyte Esterase NEGATIVE  NEG     BETA HCG, QT    Collection Time: 11/10/19 11:45 AM   Result Value Ref Range    Beta HCG, QT 8,002 (H) 0 - 10 MIU/ML   URINE MICROSCOPIC ONLY    Collection Time: 11/10/19 11:45 AM   Result Value Ref Range    WBC 1 to 2 0 - 4 /hpf    RBC 1 to 2 0 - 5 /hpf    Epithelial cells 2+ 0 - 5 /lpf   WET PREP    Collection Time: 11/10/19  1:28 PM   Result Value Ref Range    Special Requests: NO SPECIAL REQUESTS      Wet prep NO YEAST,TRICHOMONAS OR CLUE CELLS NOTED         Radiologic Studies -   US PREG UTS < 14 WKS SNGL   Final Result   IMPRESSION:      Early intrauterine pregnancy with a gestational sac and yolk sac, dating 5 weeks   0 days. Small adjacent subchorionic hemorrhage. Right ovary negative. Possible small cyst left ovary. Trace fluid in the   cul-de-sac. Medical Decision Making   I am the first provider for this patient. I reviewed the vital signs, available nursing notes, past medical history, past surgical history, family history and social history. Vital Signs-Reviewed the patient's vital signs. Records Reviewed: Nursing Notes and Old Medical Records (Time of Review: 2:09 PM)    ED Course: Progress Notes, Reevaluation, and Consults:     Patient is insisting on leaving so she can  her child. Patient hemodynamically remained stable and the ultrasound is not officially read yet but appears to have a subchorionic hemorrhage with gestational sac however no fetal pole or fetal heart rate seen. No adnexal masses were noted based on the preliminary read. Suspect this is an  in progress. Will initiate supportive care and have the patient follow with her OB in the next 24 hours and have educated her that she may have cramping and more pain to follow as she progresses through this process. She understands and will return if at all worsened or concerned. I have also informed the patient I would like to continue to watch her while I get the formal ultrasound report back but needs to go. Because I do not have the formal report we will give her ectopic precautions as well.   Nanette Duarte, DO 5:18 PM      Provider Notes (Medical Decision Making):   MDM  Number of Diagnoses or Management Options  SAB (spontaneous ):   Diagnosis management comments: Patient is a 50-year-old female  the presents at approximately 8 weeks with complaint of vaginal bleeding and cramping. Patient was able to show me a picture of clots that were in around the toilet. Patient's Nolene Blazing has trended upwards we will proceed with a transvaginal ultrasound to evaluate the progress of the pregnancy. Patient is Rh+ based on previous records reviewed in the system. Fortunato Schreibero, DO 2:13 PM        Procedures        Diagnosis     Clinical Impression:   1. SAB (spontaneous )        Disposition: DC    Follow-up Information    None          Patient's Medications   Start Taking    No medications on file   Continue Taking    CAMPHOR-EUCALYPTUS OIL-MENTHOL (VICKS VAPORUB) 4.8-1.2-2.6 % OINT    1 Actuation(s) by Apply Externally route three (3) times daily as needed for Cough or Other (congestion). CAMPHOR-EUCALYPTUS-MENTHOL (VICKS VAPOSTEAM) LIQD    1 Actuation(s) by Does Not Apply route three (3) times daily as needed for Cough or Other (congestion). DIPHENHYDRAMINE (BENADRYL) 25 MG CAPSULE    Take 50 mg by mouth every six (6) hours as needed. PRENATAL MULTIVIT-CA-MIN-FE-FA (PRENATAL VITAMIN) TAB    Take 1 Tab by mouth daily. VAPORIZERS ([employer] WARM STEAM VAPORIZER) MISC    1 Actuation(s) by Does Not Apply route three (3) times daily as needed for Cough or Other (congestion). These Medications have changed    No medications on file   Stop Taking    No medications on file     Disclaimer: Sections of this note are dictated using utilizing voice recognition software. Minor typographical errors may be present. If questions arise, please do not hesitate to contact me or call our department.

## 2019-11-10 NOTE — DISCHARGE INSTRUCTIONS
Patient Education        Miscarriage: Care Instructions  Your Care Instructions    The loss of a pregnancy can be very hard. You may wonder why it happened or blame yourself. Miscarriages are common and are not caused by exercise, stress, or sex. Most happen because the fertilized egg in the uterus does not develop normally. There is no treatment that can stop a miscarriage. As long as you do not have heavy blood loss, fever, weakness, or other signs of infection, you can let a miscarriage follow its own course. This can take several days. Your body will recover over the next several weeks. Having a miscarriage does not mean you cannot have a normal pregnancy in the future. The doctor has checked you carefully, but problems can develop later. If you notice any problems or new symptoms, get medical treatment right away. Follow-up care is a key part of your treatment and safety. Be sure to make and go to all appointments, and call your doctor if you are having problems. It's also a good idea to know your test results and keep a list of the medicines you take. How can you care for yourself at home? · You will probably have some vaginal bleeding for 1 to 2 weeks. It may be similar to or slightly heavier than a normal period. The bleeding should get lighter after a week. Use pads instead of tampons. You may use tampons during your next period, which should start in 3 to 6 weeks. · Take an over-the-counter pain medicine, such as acetaminophen (Tylenol), ibuprofen (Advil, Motrin), or naproxen (Aleve) for cramps. Read and follow all instructions on the label. You may have cramps for several days after the miscarriage. · Do not take two or more pain medicines at the same time unless the doctor told you to. Many pain medicines have acetaminophen, which is Tylenol. Too much acetaminophen (Tylenol) can be harmful. · Use a clear container to save any tissue that you pass.  Take it to your doctor's office as soon as you can.  · Do not have sex until the bleeding stops. · You may return to your normal activities if you feel well enough to do so. But you should avoid heavy exercise until the bleeding stops. · If you would like to try to get pregnant again, it is usually safe whenever you feel ready. Talk with your doctor about any future pregnancy plans. · If you do not want to get pregnant, ask your doctor about birth control. You can get pregnant again before your next period starts if you are not using birth control. · You may be low in iron because of blood loss. Eat a balanced diet that is high in iron and vitamin C. Foods rich in iron include red meat, shellfish, eggs, beans, and leafy green vegetables. Foods high in vitamin C include citrus fruits, tomatoes, and broccoli. Talk to your doctor about whether you need to take iron pills or a multivitamin. · The loss of a pregnancy can be very hard. You may wonder why it happened and blame yourself. Talking to family members, friends, a counselor, or your doctor may help you cope with your loss. When should you call for help? Call 911 anytime you think you may need emergency care. For example, call if:    · You passed out (lost consciousness).    Call your doctor now or seek immediate medical care if:    · You have severe vaginal bleeding.     · You are dizzy or lightheaded, or you feel like you may faint.     · You have new or worse pain in your belly or pelvis.     · You have a fever.     · You have vaginal discharge that smells bad.    Watch closely for changes in your health, and be sure to contact your doctor if:    · You do not get better as expected. Where can you learn more? Go to http://matt-rashida.info/. Enter E802 in the search box to learn more about \"Miscarriage: Care Instructions. \"  Current as of: May 29, 2019  Content Version: 12.2  © 9217-8733 [employer].  Care instructions adapted under license by [employer] (which disclaims liability or warranty for this information). If you have questions about a medical condition or this instruction, always ask your healthcare professional. [address] any warranty or liability for your use of this information.

## 2019-11-10 NOTE — ED TRIAGE NOTES
The patient is currently approximately seven weeks pregnant. She presents for evaluation of vaginal bleeding with clots that began this morning. G-7, P-3, AB-3. LMP 10/1/2019.

## 2019-11-11 LAB
C TRACH RRNA SPEC QL NAA+PROBE: NEGATIVE
N GONORRHOEA RRNA SPEC QL NAA+PROBE: NEGATIVE
SPECIMEN SOURCE: NORMAL

## 2020-02-08 PROCEDURE — 99282 EMERGENCY DEPT VISIT SF MDM: CPT

## 2020-02-08 PROCEDURE — 96374 THER/PROPH/DIAG INJ IV PUSH: CPT

## 2020-02-09 ENCOUNTER — HOSPITAL ENCOUNTER (EMERGENCY)
Age: 30
Discharge: HOME | End: 2020-02-09
Attending: EMERGENCY MEDICINE
Payer: MEDICAID

## 2020-02-09 VITALS
SYSTOLIC BLOOD PRESSURE: 123 MMHG | HEART RATE: 105 BPM | OXYGEN SATURATION: 100 % | WEIGHT: 230 LBS | TEMPERATURE: 97.3 F | BODY MASS INDEX: 40.75 KG/M2 | HEIGHT: 63 IN | DIASTOLIC BLOOD PRESSURE: 81 MMHG | RESPIRATION RATE: 18 BRPM

## 2020-02-09 DIAGNOSIS — L23.9 ALLERGIC CONTACT DERMATITIS, UNSPECIFIED TRIGGER: Primary | ICD-10-CM

## 2020-02-09 PROCEDURE — 96374 THER/PROPH/DIAG INJ IV PUSH: CPT

## 2020-02-09 PROCEDURE — 74011250636 HC RX REV CODE- 250/636: Performed by: PHYSICIAN ASSISTANT

## 2020-02-09 RX ORDER — PREDNISONE 20 MG/1
40 TABLET ORAL DAILY
Qty: 8 TAB | Refills: 0 | Status: SHIPPED | OUTPATIENT
Start: 2020-02-09 | End: 2020-02-13

## 2020-02-09 RX ADMIN — METHYLPREDNISOLONE SODIUM SUCCINATE 125 MG: 125 INJECTION, POWDER, FOR SOLUTION INTRAMUSCULAR; INTRAVENOUS at 01:29

## 2020-02-09 NOTE — DISCHARGE INSTRUCTIONS
Patient Education        Dermatitis: Care Instructions  Your Care Instructions  Dermatitis is the general name used for any rash or inflammation of the skin. Different kinds of dermatitis cause different kinds of rashes. Common causes of a rash include new medicines, plants (such as poison oak or poison ivy), heat, and stress. Certain illnesses can also cause a rash. An allergic reaction to something that touches your skin, such as latex, nickel, or poison ivy, is called contact dermatitis. Contact dermatitis may also be caused by something that irritates the skin, such as bleach, a chemical, or soap. These types of rashes cannot be spread from person to person. How long your rash will last depends on what caused it. Rashes may last a few days or months. Follow-up care is a key part of your treatment and safety. Be sure to make and go to all appointments, and call your doctor if you are having problems. It's also a good idea to know your test results and keep a list of the medicines you take. How can you care for yourself at home? · Do not scratch the rash. Cut your nails short, and file them smooth. Or wear gloves if this helps keep you from scratching. · Wash the area with water only. Pat dry. · Put cold, wet cloths on the rash to reduce itching. · Keep cool, and stay out of the sun. · Leave the rash open to the air as much as possible. · If the rash itches, use hydrocortisone cream. Follow the directions on the label. Calamine lotion may help for plant rashes. · Take an over-the-counter antihistamine, such as diphenhydramine (Benadryl) or loratadine (Claritin), to help calm the itching. Read and follow all instructions on the label. · If your doctor prescribed a cream, use it as directed. If your doctor prescribed medicine, take it exactly as directed. When should you call for help?   Call your doctor now or seek immediate medical care if:    · You have symptoms of infection, such as:  ? Increased pain, swelling, warmth, or redness. ? Red streaks leading from the area. ? Pus draining from the area. ? A fever.     · You have joint pain along with the rash.    Watch closely for changes in your health, and be sure to contact your doctor if:    · Your rash is changing or getting worse.     · You are not getting better as expected. Where can you learn more? Go to http://matt-rashida.info/. Enter [id number] in the search box to learn more about \"Dermatitis: Care Instructions. \"  Current as of: April 1, 2019  Content Version: 12.2  © 9120-6358 [employer]. Care instructions adapted under license by [employer] (which disclaims liability or warranty for this information). If you have questions about a medical condition or this instruction, always ask your healthcare professional. [address] any warranty or liability for your use of this information.

## 2020-02-09 NOTE — ED TRIAGE NOTES
Patient complaints of rash, hives and possible insect bites throughout her entire body off/on for several weeks.

## 2020-02-09 NOTE — ED PROVIDER NOTES
EMERGENCY DEPARTMENT HISTORY AND PHYSICAL EXAM    Date: 2020  Patient Name: Guillermo Smith    History of Presenting Illness     Chief Complaint   Patient presents with    Skin Problem         History Provided By: Patient        Additional History (Context): Guillermo Smith is a 26 y.o. female with No significant past medical history who presents with generalized rash x2 weeks. Patient states she is unsure what is causing the rash. She was seen for similar on  and diagnosed with urticaria due to allergic reaction. She denies any new soaps, detergents, foods, medications or lotions. She does report seeing bed bugs in her apartment, but unsure if these are the culprit. States she is very itchy and has tried Benadryl, Cortisone and oatmeal baths w/o relief. No other acute complaints. Patient denies known contacts in the household with similar symptoms. PCP: Richie Prince NP    Current Outpatient Medications   Medication Sig Dispense Refill    predniSONE (DELTASONE) 20 mg tablet Take 40 mg by mouth daily for 4 days. With Breakfast 8 Tab 0    acetaminophen (TYLENOL) 325 mg tablet Take 2 Tabs by mouth every four (4) hours as needed for Pain. 20 Tab 0    prenatal multivit-ca-min-fe-fa (PRENATAL VITAMIN) tab Take 1 Tab by mouth daily.  30 Tab 0       Past History     Past Medical History:  Past Medical History:   Diagnosis Date    Anxiety     Gestational diabetes     History of PCOS        Past Surgical History:  Past Surgical History:   Procedure Laterality Date     Rd  SECTION  , ,        Family History:  Family History   Problem Relation Age of Onset    Cancer Paternal Grandfather         lung- smoker    Cancer Paternal Grandmother         unknown    Cancer Mother         breast- unknown age       Social History:  Social History     Tobacco Use    Smoking status: Never Smoker    Smokeless tobacco: Never Used   Substance Use Topics    Alcohol use: Yes     Comment: rarely    Drug use: No       Allergies: Allergies   Allergen Reactions    Vicodin [Hydrocodone-Acetaminophen] Anxiety         Review of Systems   Review of Systems   Constitutional: Negative for chills and fever. HENT: Negative for congestion and sore throat. Eyes: Negative for visual disturbance. Respiratory: Negative for shortness of breath. Cardiovascular: Negative for chest pain. Gastrointestinal: Negative for abdominal pain and nausea. Genitourinary: Negative for dysuria and hematuria. Musculoskeletal: Negative for back pain. Skin: Positive for rash (generalized). Neurological: Negative for headaches. Psychiatric/Behavioral: Negative for confusion. All Other Systems Negative  Physical Exam     Vitals:    02/09/20 0004   BP: 123/81   Pulse: (!) 105   Resp: 18   Temp: 97.3 °F (36.3 °C)   SpO2: 100%   Weight: 104.3 kg (230 lb)   Height: 5' 3\" (1.6 m)     Physical Exam  Vitals signs and nursing note reviewed. Constitutional:       Appearance: Normal appearance. HENT:      Head: Normocephalic and atraumatic. Nose: Nose normal.      Mouth/Throat:      Pharynx: Oropharynx is clear. Comments: No edema of the mouth, tongue or lips. Uvula is midline. Eyes:      Pupils: Pupils are equal, round, and reactive to light. Neck:      Musculoskeletal: Neck supple. Cardiovascular:      Rate and Rhythm: Normal rate and regular rhythm. Heart sounds: Normal heart sounds. No murmur. No gallop. Pulmonary:      Effort: Pulmonary effort is normal. No respiratory distress. Breath sounds: Normal breath sounds. Abdominal:      Palpations: Abdomen is soft. Tenderness: There is no abdominal tenderness. Skin:     General: Skin is warm. Findings: Rash present. Comments: Fine raised urticaria of the trunk groin and neck are present. No infestations are seen, no nits,   no burrowing in the webspaces of the hands. Neurological:      Mental Status: She is alert. Mental status is at baseline. Gait: Gait normal.   Psychiatric:         Mood and Affect: Mood normal.         Behavior: Behavior normal.           Diagnostic Study Results     Labs -   No results found for this or any previous visit (from the past 12 hour(s)). Radiologic Studies -   No orders to display     CT Results  (Last 48 hours)    None        CXR Results  (Last 48 hours)    None            Medical Decision Making   I am the first provider for this patient. I reviewed the vital signs, available nursing notes, past medical history, past surgical history, family history and social history. Vital Signs-Reviewed the patient's vital signs. Comparison:    Records Reviewed: Nursing Notes and Old Medical Records    Procedures:  Procedures    Provider Notes (Medical Decision Making):     Patient here for generalized rash x2 weeks. No new soaps, detergents, foods, medications or lotions at onset. She has now started purchasing new soaps and detergents since developing rash. Reports rash is very itchy and no relief with Benadryl or Hydrocortisone. On exam, there are several areas of erythematous raised papules and macules on trunk, back, bilat upper extremities and inner thighs consistent with urticaria. Rash not consistent with scabies or bed bug bites. I suspect this rash is caused by an allergic reaction to something she is coming in contact with. Will give patient Solumedrol injection here tonight and will send prescription of prednisone to pharmacy. She agrees with plan. MED RECONCILIATION:  No current facility-administered medications for this encounter. Current Outpatient Medications   Medication Sig    predniSONE (DELTASONE) 20 mg tablet Take 40 mg by mouth daily for 4 days. With Breakfast    acetaminophen (TYLENOL) 325 mg tablet Take 2 Tabs by mouth every four (4) hours as needed for Pain.  prenatal multivit-ca-min-fe-fa (PRENATAL VITAMIN) tab Take 1 Tab by mouth daily. Disposition:  Home    DISCHARGE NOTE:     Patient seen, examined and discharged from triage. Patient has no other complaints, changes, or physical findings. Care plan outlined and precautions discussed. Results of exam were reviewed with the patient. All medications were reviewed with the patient; will d/c home with follow up instructions. All of pt's questions and concerns were addressed. Patient was instructed and agrees to follow up with primary care, as well as to return to the ED upon further deterioration. Patient is ready to go home. Follow-up Information     Follow up With Specialties Details Why Contact Info    Lorena Morris NP Nurse Practitioner Schedule an appointment as soon as possible for a visit As needed [address] [place] [address] [address] [facility] EMERGENCY DEPT Emergency Medicine  If symptoms worsen [address] [place] [id number]  [phone]          Discharge Medication List as of 2/9/2020  1:37 AM      START taking these medications    Details   predniSONE (DELTASONE) 20 mg tablet Take 40 mg by mouth daily for 4 days. With Breakfast, Normal, Disp-8 Tab, R-0         CONTINUE these medications which have NOT CHANGED    Details   acetaminophen (TYLENOL) 325 mg tablet Take 2 Tabs by mouth every four (4) hours as needed for Pain., Normal, Disp-20 Tab, R-0      prenatal multivit-ca-min-fe-fa (PRENATAL VITAMIN) tab Take 1 Tab by mouth daily. , Print, Disp-30 Tab, R-0                 Diagnosis     Clinical Impression:   1.  Allergic contact dermatitis, unspecified trigger

## 2021-06-20 ENCOUNTER — HOSPITAL ENCOUNTER (INPATIENT)
Age: 31
LOS: 7 days | Discharge: HOME | DRG: 724 | End: 2021-06-27
Attending: EMERGENCY MEDICINE | Admitting: HOSPITALIST
Payer: MEDICAID

## 2021-06-20 ENCOUNTER — APPOINTMENT (OUTPATIENT)
Dept: CT IMAGING | Age: 31
DRG: 724 | End: 2021-06-20
Attending: PHYSICIAN ASSISTANT
Payer: MEDICAID

## 2021-06-20 ENCOUNTER — APPOINTMENT (OUTPATIENT)
Dept: GENERAL RADIOLOGY | Age: 31
DRG: 724 | End: 2021-06-20
Attending: PHYSICIAN ASSISTANT
Payer: MEDICAID

## 2021-06-20 DIAGNOSIS — R31.9 URINARY TRACT INFECTION WITH HEMATURIA, SITE UNSPECIFIED: ICD-10-CM

## 2021-06-20 DIAGNOSIS — R00.0 TACHYCARDIA: ICD-10-CM

## 2021-06-20 DIAGNOSIS — N39.0 URINARY TRACT INFECTION WITH HEMATURIA, SITE UNSPECIFIED: ICD-10-CM

## 2021-06-20 DIAGNOSIS — D72.829 LEUKOCYTOSIS, UNSPECIFIED TYPE: ICD-10-CM

## 2021-06-20 DIAGNOSIS — R50.9 FEVER, UNSPECIFIED FEVER CAUSE: Primary | ICD-10-CM

## 2021-06-20 LAB
ALBUMIN SERPL-MCNC: 4 G/DL (ref 3.4–5)
ALBUMIN/GLOB SERPL: 0.9 {RATIO} (ref 0.8–1.7)
ALP SERPL-CCNC: 69 U/L (ref 45–117)
ALT SERPL-CCNC: 35 U/L (ref 13–56)
ANION GAP SERPL CALC-SCNC: 7 MMOL/L (ref 3–18)
APPEARANCE UR: ABNORMAL
AST SERPL-CCNC: 14 U/L (ref 10–38)
BACTERIA URNS QL MICRO: ABNORMAL /HPF
BASOPHILS # BLD: 0 K/UL (ref 0–0.1)
BASOPHILS NFR BLD: 0 % (ref 0–2)
BILIRUB SERPL-MCNC: 0.5 MG/DL (ref 0.2–1)
BILIRUB UR QL: NEGATIVE
BUN SERPL-MCNC: 5 MG/DL (ref 7–18)
BUN/CREAT SERPL: 8 (ref 12–20)
CALCIUM SERPL-MCNC: 9.3 MG/DL (ref 8.5–10.1)
CHLORIDE SERPL-SCNC: 99 MMOL/L (ref 100–111)
CO2 SERPL-SCNC: 28 MMOL/L (ref 21–32)
COLOR UR: YELLOW
CREAT SERPL-MCNC: 0.64 MG/DL (ref 0.6–1.3)
D DIMER PPP FEU-MCNC: 0.56 UG/ML(FEU)
DIFFERENTIAL METHOD BLD: ABNORMAL
EOSINOPHIL # BLD: 0 K/UL (ref 0–0.4)
EOSINOPHIL NFR BLD: 0 % (ref 0–5)
EPITH CASTS URNS QL MICRO: ABNORMAL /LPF (ref 0–5)
ERYTHROCYTE [DISTWIDTH] IN BLOOD BY AUTOMATED COUNT: 12.9 % (ref 11.6–14.5)
GLOBULIN SER CALC-MCNC: 4.5 G/DL (ref 2–4)
GLUCOSE SERPL-MCNC: 115 MG/DL (ref 74–99)
GLUCOSE UR STRIP.AUTO-MCNC: NEGATIVE MG/DL
HCG SERPL QL: NEGATIVE
HCT VFR BLD AUTO: 38.8 % (ref 35–45)
HGB BLD-MCNC: 13.2 G/DL (ref 12–16)
HGB UR QL STRIP: ABNORMAL
KETONES UR QL STRIP.AUTO: NEGATIVE MG/DL
LACTATE BLD-SCNC: 1.91 MMOL/L (ref 0.4–2)
LEUKOCYTE ESTERASE UR QL STRIP.AUTO: ABNORMAL
LYMPHOCYTES # BLD: 2.1 K/UL (ref 0.9–3.6)
LYMPHOCYTES NFR BLD: 14 % (ref 21–52)
MCH RBC QN AUTO: 28.6 PG (ref 24–34)
MCHC RBC AUTO-ENTMCNC: 34 G/DL (ref 31–37)
MCV RBC AUTO: 84.2 FL (ref 74–97)
MONOCYTES # BLD: 1 K/UL (ref 0.05–1.2)
MONOCYTES NFR BLD: 6 % (ref 3–10)
NEUTS SEG # BLD: 12.2 K/UL (ref 1.8–8)
NEUTS SEG NFR BLD: 79 % (ref 40–73)
NITRITE UR QL STRIP.AUTO: NEGATIVE
PH UR STRIP: >8.5 [PH] (ref 5–8)
PLATELET # BLD AUTO: 399 K/UL (ref 135–420)
PMV BLD AUTO: 8.8 FL (ref 9.2–11.8)
POTASSIUM SERPL-SCNC: 4.1 MMOL/L (ref 3.5–5.5)
PROT SERPL-MCNC: 8.5 G/DL (ref 6.4–8.2)
PROT UR STRIP-MCNC: NEGATIVE MG/DL
RBC # BLD AUTO: 4.61 M/UL (ref 4.2–5.3)
RBC #/AREA URNS HPF: ABNORMAL /HPF (ref 0–5)
SODIUM SERPL-SCNC: 134 MMOL/L (ref 136–145)
SP GR UR REFRACTOMETRY: 1.01 (ref 1–1.03)
TRICHOMONAS UR QL MICRO: ABNORMAL
UROBILINOGEN UR QL STRIP.AUTO: 0.2 EU/DL (ref 0.2–1)
WBC # BLD AUTO: 15.4 K/UL (ref 4.6–13.2)
WBC URNS QL MICRO: ABNORMAL /HPF (ref 0–5)

## 2021-06-20 PROCEDURE — 96361 HYDRATE IV INFUSION ADD-ON: CPT

## 2021-06-20 PROCEDURE — 99285 EMERGENCY DEPT VISIT HI MDM: CPT

## 2021-06-20 PROCEDURE — 65660000000 HC RM CCU STEPDOWN

## 2021-06-20 PROCEDURE — 96360 HYDRATION IV INFUSION INIT: CPT

## 2021-06-20 PROCEDURE — 87040 BLOOD CULTURE FOR BACTERIA: CPT

## 2021-06-20 PROCEDURE — 74011000636 HC RX REV CODE- 636: Performed by: EMERGENCY MEDICINE

## 2021-06-20 PROCEDURE — 87086 URINE CULTURE/COLONY COUNT: CPT

## 2021-06-20 PROCEDURE — 85025 COMPLETE CBC W/AUTO DIFF WBC: CPT

## 2021-06-20 PROCEDURE — 99223 1ST HOSP IP/OBS HIGH 75: CPT | Performed by: HOSPITALIST

## 2021-06-20 PROCEDURE — 74011250636 HC RX REV CODE- 250/636: Performed by: HOSPITALIST

## 2021-06-20 PROCEDURE — 83605 ASSAY OF LACTIC ACID: CPT

## 2021-06-20 PROCEDURE — 93005 ELECTROCARDIOGRAM TRACING: CPT

## 2021-06-20 PROCEDURE — 81025 URINE PREGNANCY TEST: CPT

## 2021-06-20 PROCEDURE — 74011000250 HC RX REV CODE- 250: Performed by: PHYSICIAN ASSISTANT

## 2021-06-20 PROCEDURE — 74011250636 HC RX REV CODE- 250/636: Performed by: PHYSICIAN ASSISTANT

## 2021-06-20 PROCEDURE — 71045 X-RAY EXAM CHEST 1 VIEW: CPT

## 2021-06-20 PROCEDURE — 81001 URINALYSIS AUTO W/SCOPE: CPT

## 2021-06-20 PROCEDURE — 84703 CHORIONIC GONADOTROPIN ASSAY: CPT

## 2021-06-20 PROCEDURE — 74011250637 HC RX REV CODE- 250/637: Performed by: PHYSICIAN ASSISTANT

## 2021-06-20 PROCEDURE — 74177 CT ABD & PELVIS W/CONTRAST: CPT

## 2021-06-20 PROCEDURE — 96375 TX/PRO/DX INJ NEW DRUG ADDON: CPT

## 2021-06-20 PROCEDURE — 80053 COMPREHEN METABOLIC PANEL: CPT

## 2021-06-20 PROCEDURE — 96365 THER/PROPH/DIAG IV INF INIT: CPT

## 2021-06-20 PROCEDURE — 85379 FIBRIN DEGRADATION QUANT: CPT

## 2021-06-20 RX ORDER — ONDANSETRON 2 MG/ML
4 INJECTION INTRAMUSCULAR; INTRAVENOUS
Status: COMPLETED | OUTPATIENT
Start: 2021-06-20 | End: 2021-06-20

## 2021-06-20 RX ORDER — ENOXAPARIN SODIUM 100 MG/ML
40 INJECTION SUBCUTANEOUS EVERY 24 HOURS
Status: DISCONTINUED | OUTPATIENT
Start: 2021-06-21 | End: 2021-06-27 | Stop reason: HOSPADM

## 2021-06-20 RX ORDER — POLYETHYLENE GLYCOL 3350 17 G/17G
17 POWDER, FOR SOLUTION ORAL DAILY PRN
Status: DISCONTINUED | OUTPATIENT
Start: 2021-06-20 | End: 2021-06-27 | Stop reason: HOSPADM

## 2021-06-20 RX ORDER — OXYBUTYNIN CHLORIDE 5 MG/1
10 TABLET, EXTENDED RELEASE ORAL DAILY
Status: DISCONTINUED | OUTPATIENT
Start: 2021-06-21 | End: 2021-06-27 | Stop reason: HOSPADM

## 2021-06-20 RX ORDER — ACETAMINOPHEN 650 MG/1
650 SUPPOSITORY RECTAL
Status: DISCONTINUED | OUTPATIENT
Start: 2021-06-20 | End: 2021-06-27 | Stop reason: HOSPADM

## 2021-06-20 RX ORDER — HYDROMORPHONE HYDROCHLORIDE 1 MG/ML
1 INJECTION, SOLUTION INTRAMUSCULAR; INTRAVENOUS; SUBCUTANEOUS
Status: COMPLETED | OUTPATIENT
Start: 2021-06-20 | End: 2021-06-20

## 2021-06-20 RX ORDER — LEVOFLOXACIN 5 MG/ML
750 INJECTION, SOLUTION INTRAVENOUS EVERY 24 HOURS
Status: DISCONTINUED | OUTPATIENT
Start: 2021-06-20 | End: 2021-06-22

## 2021-06-20 RX ORDER — ONDANSETRON 4 MG/1
4 TABLET, ORALLY DISINTEGRATING ORAL
Status: DISCONTINUED | OUTPATIENT
Start: 2021-06-20 | End: 2021-06-27 | Stop reason: HOSPADM

## 2021-06-20 RX ORDER — ACETAMINOPHEN 325 MG/1
650 TABLET ORAL
Status: DISCONTINUED | OUTPATIENT
Start: 2021-06-20 | End: 2021-06-27 | Stop reason: HOSPADM

## 2021-06-20 RX ORDER — SODIUM CHLORIDE 0.9 % (FLUSH) 0.9 %
5-10 SYRINGE (ML) INJECTION AS NEEDED
Status: DISCONTINUED | OUTPATIENT
Start: 2021-06-20 | End: 2021-06-27 | Stop reason: HOSPADM

## 2021-06-20 RX ORDER — ENOXAPARIN SODIUM 150 MG/ML
1 INJECTION SUBCUTANEOUS
Status: COMPLETED | OUTPATIENT
Start: 2021-06-20 | End: 2021-06-20

## 2021-06-20 RX ORDER — SODIUM CHLORIDE 0.9 % (FLUSH) 0.9 %
5-40 SYRINGE (ML) INJECTION EVERY 8 HOURS
Status: DISCONTINUED | OUTPATIENT
Start: 2021-06-20 | End: 2021-06-27 | Stop reason: HOSPADM

## 2021-06-20 RX ORDER — ACETAMINOPHEN 500 MG
1000 TABLET ORAL
Status: COMPLETED | OUTPATIENT
Start: 2021-06-20 | End: 2021-06-20

## 2021-06-20 RX ORDER — ONDANSETRON 2 MG/ML
4 INJECTION INTRAMUSCULAR; INTRAVENOUS
Status: DISCONTINUED | OUTPATIENT
Start: 2021-06-20 | End: 2021-06-27 | Stop reason: HOSPADM

## 2021-06-20 RX ORDER — METRONIDAZOLE 500 MG/1
2000 TABLET ORAL
Status: COMPLETED | OUTPATIENT
Start: 2021-06-20 | End: 2021-06-20

## 2021-06-20 RX ORDER — SODIUM CHLORIDE 0.9 % (FLUSH) 0.9 %
5-40 SYRINGE (ML) INJECTION AS NEEDED
Status: DISCONTINUED | OUTPATIENT
Start: 2021-06-20 | End: 2021-06-27 | Stop reason: HOSPADM

## 2021-06-20 RX ADMIN — ONDANSETRON 4 MG: 2 INJECTION INTRAMUSCULAR; INTRAVENOUS at 17:53

## 2021-06-20 RX ADMIN — Medication 10 ML: at 23:15

## 2021-06-20 RX ADMIN — ACETAMINOPHEN 1000 MG: 500 TABLET ORAL at 17:42

## 2021-06-20 RX ADMIN — SODIUM CHLORIDE 1000 ML: 900 INJECTION, SOLUTION INTRAVENOUS at 18:40

## 2021-06-20 RX ADMIN — LEVOFLOXACIN 750 MG: 5 INJECTION, SOLUTION INTRAVENOUS at 18:04

## 2021-06-20 RX ADMIN — HYDROMORPHONE HYDROCHLORIDE 1 MG: 1 INJECTION, SOLUTION INTRAMUSCULAR; INTRAVENOUS; SUBCUTANEOUS at 23:11

## 2021-06-20 RX ADMIN — SODIUM CHLORIDE 1000 ML: 900 INJECTION, SOLUTION INTRAVENOUS at 17:42

## 2021-06-20 RX ADMIN — IOPAMIDOL 100 ML: 612 INJECTION, SOLUTION INTRAVENOUS at 18:58

## 2021-06-20 RX ADMIN — METRONIDAZOLE 2000 MG: 500 TABLET ORAL at 20:48

## 2021-06-20 RX ADMIN — HYDROMORPHONE HYDROCHLORIDE 1 MG: 1 INJECTION, SOLUTION INTRAMUSCULAR; INTRAVENOUS; SUBCUTANEOUS at 17:56

## 2021-06-20 RX ADMIN — SODIUM CHLORIDE 1000 ML: 900 INJECTION, SOLUTION INTRAVENOUS at 17:41

## 2021-06-20 RX ADMIN — ENOXAPARIN SODIUM 110 MG: 120 INJECTION SUBCUTANEOUS at 23:10

## 2021-06-20 RX ADMIN — CEFEPIME HYDROCHLORIDE 2 G: 2 INJECTION, POWDER, FOR SOLUTION INTRAVENOUS at 17:47

## 2021-06-20 NOTE — ED NOTES
Received shift report from [facility]. Patient alert and oriented x 4. Patient complaining of left flank pain. Patient denies any chest pain, shortness of breath, nausea, vomiting. Patient resting on the stretcher with side rails up x 2. Call bell within reach. Bolus infusing, antibiotic infusing. No obvious signs of distress noted.

## 2021-06-20 NOTE — Clinical Note
Status[de-identified] INPATIENT [101]   Type of Bed: Telemetry [19]   Cardiac Monitoring Required?: Yes   Inpatient Hospitalization Certified Necessary for the Following Reasons: 1.  Patient Failed outpatient treatment (further clarification in H&P documentation)   Admitting Diagnosis: UTI (urinary tract infection) [407174]   Admitting Diagnosis: Tachycardia [950593]   Admitting Diagnosis: Fever [6451238]   Admitting Physician: Ismael Smith [[id number]]   Attending Physician: Ismael Smith [[id number]]   Estimated Length of Stay: 2 Midnights   Discharge Plan[de-identified] Home with Office Follow-up

## 2021-06-20 NOTE — ED PROVIDER NOTES
EMERGENCY DEPARTMENT HISTORY AND PHYSICAL EXAM    Date: 6/20/2021  Patient Name: Klaudia Barrios    History of Presenting Illness     Chief Complaint   Patient presents with    Flank Pain         History Provided By: patient     Chief Complaint: fever, flank pain (post op)  Duration: 2 days  Timing:  Acute   Location: L flank   Quality: sharp pain   Severity severe  Modifying Factors: none   Associated Symptoms: flank pain, fever, chills, N/V       Additional History (Context): Klaudia Barrios is a 31 y.o. female with PMH anxiety, PCOS, and gestational diabetes who presents with complaints of left flank pain, N/V, and fever/chills for the past 24 hours. Patient reportedly had a lithotripsy and stent placement done through urology [facility] on Wednesday. She states that she was advised to remove her stent at home yesterday. Patient states she successfully removed the stent but has since experienced increasing flank pain and fever/chills. She states she attempted to call the urology [facility] office but was advised to report to the emergency department for further evaluation. No known sick exposures. No changes to her urine output. No other complaints are reported at this time.     PCP: Toney Stokes NP    Current Facility-Administered Medications   Medication Dose Route Frequency Provider Last Rate Last Admin    sodium chloride (NS) flush 5-10 mL  5-10 mL IntraVENous PRN Ramona Lamb PA-C        cefepime (MAXIPIME) 2 g in sterile water (preservative free) 10 mL IV syringe  2 g IntraVENous Q8H Ramona Lamb PA-C   IV Completed at 06/20/21 1750    levoFLOXacin (LEVAQUIN) 750 mg in D5W IVPB  750 mg IntraVENous Q24H Ramona Lamb PA-C   IV Completed at 06/20/21 1934    sodium chloride 0.9 % bolus infusion 267 mL  267 mL IntraVENous ONCE Ramona Lamb PA-C        enoxaparin (LOVENOX) injection 110 mg  1 mg/kg SubCUTAneous NOW Ovidio Russell MD        HYDROmorphone (DILAUDID) injection 1 mg  1 mg IntraVENous NOW Giuliana Russell MD         Current Outpatient Medications   Medication Sig Dispense Refill    oxybutynin chloride XL (DITROPAN XL) 10 mg CR tablet Take 1 Tablet by mouth daily. 30 Tablet 1    acetaminophen (TYLENOL) 325 mg tablet Take 2 Tabs by mouth every four (4) hours as needed for Pain. 20 Tab 0    prenatal multivit-ca-min-fe-fa (PRENATAL VITAMIN) tab Take 1 Tab by mouth daily. 30 Tab 0       Past History     Past Medical History:  Past Medical History:   Diagnosis Date    Anxiety     Gestational diabetes     History of PCOS        Past Surgical History:  Past Surgical History:   Procedure Laterality Date    HX  SECTION  , ,     HX UROLOGICAL  2021    CYSTOSCOPY,LEFT URETEROSCOPY, LASER,LEFT STENT EXCHANGE retrograde pyleogram; Dr. Helen Petersen       Family History:  Family History   Problem Relation Age of Onset    Cancer Paternal Grandfather         lung- smoker    Cancer Paternal Grandmother         unknown    Cancer Mother         breast- unknown age       Social History:  Social History     Tobacco Use    Smoking status: Never Smoker    Smokeless tobacco: Never Used   Substance Use Topics    Alcohol use: Yes     Comment: rarely    Drug use: No       Allergies: Allergies   Allergen Reactions    Vicodin [Hydrocodone-Acetaminophen] Anxiety         Review of Systems   Review of Systems   Constitutional: Positive for chills and fever. HENT: Negative. Negative for congestion, ear pain and rhinorrhea. Eyes: Negative. Negative for pain and redness. Respiratory: Negative. Negative for cough, shortness of breath, wheezing and stridor. Cardiovascular: Negative. Negative for chest pain and leg swelling. Gastrointestinal: Positive for nausea and vomiting. Negative for abdominal pain, constipation and diarrhea. Genitourinary: Positive for flank pain. Negative for dysuria and frequency.    Musculoskeletal: Negative for back pain and neck pain. Skin: Negative. Negative for rash and wound. Neurological: Negative. Negative for dizziness, seizures, syncope and headaches. All other systems reviewed and are negative. All Other Systems Negative  Physical Exam     Vitals:    06/20/21 2145 06/20/21 2200 06/20/21 2215 06/20/21 2230   BP: 112/65 111/68 113/84 112/63   Pulse: (!) 121 (!) 126 (!) 136 (!) 125   Resp: 26 25 17 23   Temp:   98.1 °F (36.7 °C)    SpO2: 99% 99% 97% 100%   Weight:       Height:         Physical Exam  Vitals and nursing note reviewed. Constitutional:       General: She is in acute distress. Appearance: She is well-developed. She is obese. She is ill-appearing and diaphoretic. Comments: Patient is diaphoretic upon entering the exam room. She appears very distressed and uncomfortable. HENT:      Head: Normocephalic and atraumatic. Eyes:      General: No scleral icterus. Right eye: No discharge. Left eye: No discharge. Conjunctiva/sclera: Conjunctivae normal.   Cardiovascular:      Rate and Rhythm: Regular rhythm. Tachycardia present. Heart sounds: Normal heart sounds. No murmur heard. No friction rub. No gallop. Comments: Tachycardic with a heart rate in the 150-160s. Pulmonary:      Effort: Pulmonary effort is normal. No respiratory distress. Breath sounds: Normal breath sounds. No stridor. No wheezing, rhonchi or rales. Abdominal:      General: Bowel sounds are normal. There is no distension. Palpations: Abdomen is soft. Tenderness: There is no abdominal tenderness. There is left CVA tenderness. There is no right CVA tenderness or guarding. Comments: Left CVA tenderness palpation noted. Musculoskeletal:         General: Normal range of motion. Cervical back: Normal range of motion and neck supple. Skin:     General: Skin is warm. Findings: No erythema or rash.    Neurological:      Mental Status: She is alert and oriented to person, place, and time. Coordination: Coordination normal.      Comments: Gait is steady and patient exhibits no evidence of ataxia. Patient is able to ambulate without difficulty. No focal neurological deficit noted. No facial droop, slurred speech, or evidence of altered mentation noted on exam.     Psychiatric:         Behavior: Behavior normal.         Thought Content: Thought content normal.                Diagnostic Study Results     Labs -     Recent Results (from the past 12 hour(s))   POC LACTIC ACID    Collection Time: 06/20/21  5:17 PM   Result Value Ref Range    Lactic Acid (POC) 1.91 0.40 - 3.43 mmol/L   METABOLIC PANEL, COMPREHENSIVE    Collection Time: 06/20/21  5:20 PM   Result Value Ref Range    Sodium 134 (L) 136 - 145 mmol/L    Potassium 4.1 3.5 - 5.5 mmol/L    Chloride 99 (L) 100 - 111 mmol/L    CO2 28 21 - 32 mmol/L    Anion gap 7 3.0 - 18 mmol/L    Glucose 115 (H) 74 - 99 mg/dL    BUN 5 (L) 7.0 - 18 MG/DL    Creatinine 0.64 0.6 - 1.3 MG/DL    BUN/Creatinine ratio 8 (L) 12 - 20      GFR est AA >60 >60 ml/min/1.73m2    GFR est non-AA >60 >60 ml/min/1.73m2    Calcium 9.3 8.5 - 10.1 MG/DL    Bilirubin, total 0.5 0.2 - 1.0 MG/DL    ALT (SGPT) 35 13 - 56 U/L    AST (SGOT) 14 10 - 38 U/L    Alk. phosphatase 69 45 - 117 U/L    Protein, total 8.5 (H) 6.4 - 8.2 g/dL    Albumin 4.0 3.4 - 5.0 g/dL    Globulin 4.5 (H) 2.0 - 4.0 g/dL    A-G Ratio 0.9 0.8 - 1.7     CBC WITH AUTOMATED DIFF    Collection Time: 06/20/21  5:20 PM   Result Value Ref Range    WBC 15.4 (H) 4.6 - 13.2 K/uL    RBC 4.61 4.20 - 5.30 M/uL    HGB 13.2 12.0 - 16.0 g/dL    HCT 38.8 35.0 - 45.0 %    MCV 84.2 74.0 - 97.0 FL    MCH 28.6 24.0 - 34.0 PG    MCHC 34.0 31.0 - 37.0 g/dL    RDW 12.9 11.6 - 14.5 %    PLATELET 610 206 - 877 K/uL    MPV 8.8 (L) 9.2 - 11.8 FL    NEUTROPHILS 79 (H) 40 - 73 %    LYMPHOCYTES 14 (L) 21 - 52 %    MONOCYTES 6 3 - 10 %    EOSINOPHILS 0 0 - 5 %    BASOPHILS 0 0 - 2 %    ABS.  NEUTROPHILS 12.2 (H) 1.8 - 8.0 K/UL ABS. LYMPHOCYTES 2.1 0.9 - 3.6 K/UL    ABS. MONOCYTES 1.0 0.05 - 1.2 K/UL    ABS. EOSINOPHILS 0.0 0.0 - 0.4 K/UL    ABS. BASOPHILS 0.0 0.0 - 0.1 K/UL    DF AUTOMATED     HCG QL SERUM    Collection Time: 06/20/21  5:20 PM   Result Value Ref Range    HCG, Ql. Negative NEG     D DIMER    Collection Time: 06/20/21  5:20 PM   Result Value Ref Range    D DIMER 0.56 (H) <0.46 ug/ml(FEU)   EKG, 12 LEAD, INITIAL    Collection Time: 06/20/21  5:24 PM   Result Value Ref Range    Ventricular Rate 158 BPM    Atrial Rate 158 BPM    P-R Interval 128 ms    QRS Duration 56 ms    Q-T Interval 302 ms    QTC Calculation (Bezet) 489 ms    Calculated P Axis 33 degrees    Calculated R Axis 96 degrees    Calculated T Axis 46 degrees    Diagnosis       Sinus tachycardia  Rightward axis  Borderline ECG  When compared with ECG of 27-OCT-2019 09:06,  No significant change was found     URINALYSIS W/ RFLX MICROSCOPIC    Collection Time: 06/20/21  6:04 PM   Result Value Ref Range    Color YELLOW      Appearance CLOUDY      Specific gravity 1.013 1.005 - 1.030      pH (UA) >8.5 (H) 5.0 - 8.0    Protein Negative NEG mg/dL    Glucose Negative NEG mg/dL    Ketone Negative NEG mg/dL    Bilirubin Negative NEG      Blood TRACE (A) NEG      Urobilinogen 0.2 0.2 - 1.0 EU/dL    Nitrites Negative NEG      Leukocyte Esterase SMALL (A) NEG     URINE MICROSCOPIC ONLY    Collection Time: 06/20/21  6:04 PM   Result Value Ref Range    WBC 36 to 50 0 - 5 /hpf    RBC 4 to 10 0 - 5 /hpf    Epithelial cells 4+ 0 - 5 /lpf    Bacteria 1+ (A) NEG /hpf    Trichomonas FEW (A) NEG         Radiologic Studies -   CT ABD PELV W CONT   Final Result   1. Trace left hydronephrosis and hydroureter, without obstructing stone. There   is ureteral wall thickening and enhancement, which may be reactive from the   recent reported stent placement/removal versus infection.   -No definite evidence for pyelonephritis      2. Hepatic steatosis and hepatomegaly. 3. Mild splenomegaly. 4. Colonic diverticulosis. XR CHEST PORT   Final Result   1. No acute cardiopulmonary process. CT Results  (Last 48 hours)               06/20/21 1859  CT ABD PELV W CONT Final result    Impression:  1. Trace left hydronephrosis and hydroureter, without obstructing stone. There   is ureteral wall thickening and enhancement, which may be reactive from the   recent reported stent placement/removal versus infection.   -No definite evidence for pyelonephritis       2. Hepatic steatosis and hepatomegaly. 3. Mild splenomegaly. 4. Colonic diverticulosis. Narrative:  EXAM: CT ABDOMEN AND PELVIS WITH CONTRAST       CLINICAL INDICATION/HISTORY: recent stent placement for kidney stones, stent   removed yesterday, fever and flank pain. Left flank pain. COMPARISON: None       TECHNIQUE:  CT abdomen and pelvis with 100 cc of Isovue IV contrast.   All CT scans at this facility are performed using dose optimization technique as   appropriate to the performed examination, to include automated exposure control,   adjustment of the mA and/or kV according to patient's size (including   appropriate matching for site-specific examinations), or use of an iterative   reconstruction technique. FINDINGS:        Lower chest: Unremarkable. Liver: Diffuse low-attenuation of the liver likely reflects steatosis. Liver is   enlarged, measuring 22 cm. Biliary: Unremarkable. Pancreas: Unremarkable. Spleen: Mild splenomegaly, measuring 13 cm. Adrenal glands: Unremarkable. Kidneys: Kidneys enhance symmetrically. Trace left hydronephrosis and   hydroureter. There is mild thickening and enhancement of the left ureteral wall,   with mild surrounding stranding. No renal or ureteral stones are seen. Reproductive organs: Unremarkable. Bladder: Unremarkable. Bowel: Colonic diverticulosis. Normal appendix.  No evidence for bowel   obstruction or inflammation. Lymph nodes: No pathologically enlarged lymph nodes. Vasculature: Unremarkable. Other: No free fluid or free air. Body wall: Unremarkable. Bones: No acute osseous abnormality. CXR Results  (Last 48 hours)               06/20/21 1742  XR CHEST PORT Final result    Impression:  1. No acute cardiopulmonary process. Narrative:  EXAM: Chest Radiograph       INDICATION:  Sepsis       TECHNIQUE: AP view of the chest       COMPARISON: 5/12/2019, 7/6/2017, 2/25/2017       FINDINGS: No pneumothorax identified. The lungs are clear. No infiltrates   appreciated. No effusions identified. The cardiomediastinal silhouette is   unremarkable. The pulmonary vasculature is unremarkable. The osseous   structures are unremarkable. Medical Decision Making   I am the first provider for this patient. I reviewed the vital signs, available nursing notes, past medical history, past surgical history, family history and social history. Vital Signs-Reviewed the patient's vital signs.       Records Reviewed: Ramona Dominguez PA-C     Procedures:  Critical Care  Performed by: Julieth Benitez  Authorized by: Ramona Yuen, [place]     Critical care provider statement:     Critical care time (minutes):  35    Critical care was necessary to treat or prevent imminent or life-threatening deterioration of the following conditions:  Sepsis    Critical care was time spent personally by me on the following activities:  Blood draw for specimens, development of treatment plan with patient or surrogate, discussions with consultants, evaluation of patient's response to treatment, examination of patient, ordering and review of laboratory studies, ordering and review of radiographic studies, re-evaluation of patient's condition, review of old charts and obtaining history from patient or surrogate    I assumed direction of critical care for this patient from another provider in my specialty: no          Provider Notes (Medical Decision Making): Impression:  Flank pain, UTI, tachycardia, fever, trichomonas     IV inserted, sepsis bundle initiated from triage  IV fluids, tylenol and abx given, dilaudid and zofran given as well    UA: consistent with UTI, sent for culture. Trichomonas noted as well. Patient made aware and treated with Flagyl in the ED.  hcg negative  Labs: WBC 15.4, chloride 99, sodium 134, normal lactic acid  Blood culture sent out  CT scan negative for evidence of pyelonephritis or postoperative infection. Progress: Patient is still persistently tachycardic in the 130s to 140s despite resolution of her fever and fluid resuscitation with IV fluids in the ED. I have consulted with ED attending Dr. Jose Fall who recommends obtaining a D-dimer at this time. D-dimer was slightly elevated at 0.56. Because the patient received a CT abdomen pelvis with contrast she cannot undergo another CT with contrast for least 24 hours. I have consulted with the hospitalist on-call Dr. Bina Cronin. She agrees to accept the patient for admission at this time. Recommendation for getting a single dose of Lovenox in the ED. This has been ordered for the patient. Patient is stable for admission.  Ramona Lamb PA-C       MED RECONCILIATION:  Current Facility-Administered Medications   Medication Dose Route Frequency    sodium chloride (NS) flush 5-10 mL  5-10 mL IntraVENous PRN    cefepime (MAXIPIME) 2 g in sterile water (preservative free) 10 mL IV syringe  2 g IntraVENous Q8H    levoFLOXacin (LEVAQUIN) 750 mg in D5W IVPB  750 mg IntraVENous Q24H    sodium chloride 0.9 % bolus infusion 267 mL  267 mL IntraVENous ONCE    enoxaparin (LOVENOX) injection 110 mg  1 mg/kg SubCUTAneous NOW    HYDROmorphone (DILAUDID) injection 1 mg  1 mg IntraVENous NOW     Current Outpatient Medications   Medication Sig    oxybutynin chloride XL (DITROPAN XL) 10 mg CR tablet Take 1 Tablet by mouth daily.  acetaminophen (TYLENOL) 325 mg tablet Take 2 Tabs by mouth every four (4) hours as needed for Pain.  prenatal multivit-ca-min-fe-fa (PRENATAL VITAMIN) tab Take 1 Tab by mouth daily. Disposition:  admitted      Core Measures:    Critical Care Time:   Critical Care Time:   I have spent 35 minutes of critical care time involved in lab review, consultations with specialist, family decision-making, and documentation. During this entire length of time I was immediately available to the patient.     Critical Care: The reason for providing this level of medical care for this critically ill patient was due a critical illness that impaired one or more vital organ systems such that there was a high probability of imminent or life threatening deterioration in the patients condition. This care involved high complexity decision making to assess, manipulate, and support vital system functions, to treat this degreee vital organ system failure and to prevent further life threatening deterioration of the patients condition. For Hospitalized Patients:    1. Hospitalization Decision Time:  The decision to hospitalize the patient was made by Dr. Tian Vaughan at 10:48 PM  on 6/20/2021    2. Aspirin: not given     Diagnosis     Clinical Impression:   1. Fever, unspecified fever cause    2. Tachycardia    3. Urinary tract infection with hematuria, site unspecified    4.  Leukocytosis, unspecified type

## 2021-06-20 NOTE — ED TRIAGE NOTES
Pt reports history of kidney stones and states she had a kidney stent placed on Wednesday.   Pt states she removed the stent yesterday and has been having left flank pain and chills

## 2021-06-21 LAB
ANION GAP SERPL CALC-SCNC: 5 MMOL/L (ref 3–18)
ATRIAL RATE: 158 BPM
B PERT DNA SPEC QL NAA+PROBE: NOT DETECTED
BASOPHILS # BLD: 0 K/UL (ref 0–0.1)
BASOPHILS NFR BLD: 0 % (ref 0–2)
BORDETELLA PARAPERTUSSIS PCR, BORPAR: NOT DETECTED
BUN SERPL-MCNC: 4 MG/DL (ref 7–18)
BUN/CREAT SERPL: 7 (ref 12–20)
C PNEUM DNA SPEC QL NAA+PROBE: NOT DETECTED
CALCIUM SERPL-MCNC: 8.4 MG/DL (ref 8.5–10.1)
CALCULATED P AXIS, ECG09: 33 DEGREES
CALCULATED R AXIS, ECG10: 96 DEGREES
CALCULATED T AXIS, ECG11: 46 DEGREES
CHLORIDE SERPL-SCNC: 104 MMOL/L (ref 100–111)
CO2 SERPL-SCNC: 27 MMOL/L (ref 21–32)
CREAT SERPL-MCNC: 0.55 MG/DL (ref 0.6–1.3)
DIAGNOSIS, 93000: NORMAL
DIFFERENTIAL METHOD BLD: ABNORMAL
EOSINOPHIL # BLD: 0 K/UL (ref 0–0.4)
EOSINOPHIL NFR BLD: 0 % (ref 0–5)
ERYTHROCYTE [DISTWIDTH] IN BLOOD BY AUTOMATED COUNT: 13.1 % (ref 11.6–14.5)
FERRITIN SERPL-MCNC: 76 NG/ML (ref 8–388)
FLUAV H1 2009 PAND RNA SPEC QL NAA+PROBE: NOT DETECTED
FLUAV H1 RNA SPEC QL NAA+PROBE: NOT DETECTED
FLUAV H3 RNA SPEC QL NAA+PROBE: NOT DETECTED
FLUAV SUBTYP SPEC NAA+PROBE: NOT DETECTED
FLUBV RNA SPEC QL NAA+PROBE: NOT DETECTED
FOLATE SERPL-MCNC: 6.4 NG/ML (ref 3.1–17.5)
GLUCOSE SERPL-MCNC: 124 MG/DL (ref 74–99)
HADV DNA SPEC QL NAA+PROBE: NOT DETECTED
HCG UR QL: NEGATIVE
HCOV 229E RNA SPEC QL NAA+PROBE: NOT DETECTED
HCOV HKU1 RNA SPEC QL NAA+PROBE: NOT DETECTED
HCOV NL63 RNA SPEC QL NAA+PROBE: NOT DETECTED
HCOV OC43 RNA SPEC QL NAA+PROBE: NOT DETECTED
HCT VFR BLD AUTO: 31.5 % (ref 35–45)
HCT VFR BLD AUTO: 32.1 % (ref 35–45)
HGB BLD-MCNC: 10.4 G/DL (ref 12–16)
HGB BLD-MCNC: 10.9 G/DL (ref 12–16)
HMPV RNA SPEC QL NAA+PROBE: NOT DETECTED
HPIV1 RNA SPEC QL NAA+PROBE: NOT DETECTED
HPIV2 RNA SPEC QL NAA+PROBE: NOT DETECTED
HPIV3 RNA SPEC QL NAA+PROBE: NOT DETECTED
HPIV4 RNA SPEC QL NAA+PROBE: NOT DETECTED
IRON SATN MFR SERPL: 7 % (ref 20–50)
IRON SERPL-MCNC: 23 UG/DL (ref 50–175)
LACTATE BLD-SCNC: 1.74 MMOL/L (ref 0.4–2)
LYMPHOCYTES # BLD: 2.3 K/UL (ref 0.9–3.6)
LYMPHOCYTES NFR BLD: 16 % (ref 21–52)
M PNEUMO DNA SPEC QL NAA+PROBE: NOT DETECTED
MAGNESIUM SERPL-MCNC: 1.9 MG/DL (ref 1.6–2.6)
MCH RBC QN AUTO: 28.7 PG (ref 24–34)
MCHC RBC AUTO-ENTMCNC: 34 G/DL (ref 31–37)
MCV RBC AUTO: 84.5 FL (ref 74–97)
MONOCYTES # BLD: 1.2 K/UL (ref 0.05–1.2)
MONOCYTES NFR BLD: 9 % (ref 3–10)
NEUTS SEG # BLD: 10.6 K/UL (ref 1.8–8)
NEUTS SEG NFR BLD: 74 % (ref 40–73)
P-R INTERVAL, ECG05: 128 MS
PLATELET # BLD AUTO: 342 K/UL (ref 135–420)
PMV BLD AUTO: 8.6 FL (ref 9.2–11.8)
POTASSIUM SERPL-SCNC: 3.7 MMOL/L (ref 3.5–5.5)
Q-T INTERVAL, ECG07: 302 MS
QRS DURATION, ECG06: 56 MS
QTC CALCULATION (BEZET), ECG08: 489 MS
RBC # BLD AUTO: 3.8 M/UL (ref 4.2–5.3)
RSV RNA SPEC QL NAA+PROBE: NOT DETECTED
RV+EV RNA SPEC QL NAA+PROBE: NOT DETECTED
SARS-COV-2 PCR, COVPCR: NOT DETECTED
SODIUM SERPL-SCNC: 136 MMOL/L (ref 136–145)
TIBC SERPL-MCNC: 315 UG/DL (ref 250–450)
VENTRICULAR RATE, ECG03: 158 BPM
VIT B12 SERPL-MCNC: 255 PG/ML (ref 211–911)
WBC # BLD AUTO: 14.3 K/UL (ref 4.6–13.2)

## 2021-06-21 PROCEDURE — 74011000250 HC RX REV CODE- 250: Performed by: INTERNAL MEDICINE

## 2021-06-21 PROCEDURE — 74011250636 HC RX REV CODE- 250/636: Performed by: HOSPITALIST

## 2021-06-21 PROCEDURE — 83605 ASSAY OF LACTIC ACID: CPT

## 2021-06-21 PROCEDURE — 74011250637 HC RX REV CODE- 250/637: Performed by: NURSE PRACTITIONER

## 2021-06-21 PROCEDURE — 85025 COMPLETE CBC W/AUTO DIFF WBC: CPT

## 2021-06-21 PROCEDURE — 74011250637 HC RX REV CODE- 250/637: Performed by: INTERNAL MEDICINE

## 2021-06-21 PROCEDURE — 80048 BASIC METABOLIC PNL TOTAL CA: CPT

## 2021-06-21 PROCEDURE — 87661 TRICHOMONAS VAGINALIS AMPLIF: CPT

## 2021-06-21 PROCEDURE — 65270000029 HC RM PRIVATE

## 2021-06-21 PROCEDURE — 82607 VITAMIN B-12: CPT

## 2021-06-21 PROCEDURE — 87491 CHLMYD TRACH DNA AMP PROBE: CPT

## 2021-06-21 PROCEDURE — 85018 HEMOGLOBIN: CPT

## 2021-06-21 PROCEDURE — 74011250636 HC RX REV CODE- 250/636: Performed by: INTERNAL MEDICINE

## 2021-06-21 PROCEDURE — 0202U NFCT DS 22 TRGT SARS-COV-2: CPT

## 2021-06-21 PROCEDURE — 83540 ASSAY OF IRON: CPT

## 2021-06-21 PROCEDURE — 2709999900 HC NON-CHARGEABLE SUPPLY

## 2021-06-21 PROCEDURE — 36415 COLL VENOUS BLD VENIPUNCTURE: CPT

## 2021-06-21 PROCEDURE — 74011250637 HC RX REV CODE- 250/637: Performed by: HOSPITALIST

## 2021-06-21 PROCEDURE — 77030027138 HC INCENT SPIROMETER -A

## 2021-06-21 PROCEDURE — 99233 SBSQ HOSP IP/OBS HIGH 50: CPT | Performed by: INTERNAL MEDICINE

## 2021-06-21 PROCEDURE — 82728 ASSAY OF FERRITIN: CPT

## 2021-06-21 PROCEDURE — 83735 ASSAY OF MAGNESIUM: CPT

## 2021-06-21 PROCEDURE — 77030009814 HC LDWRE ECG PHIL -B

## 2021-06-21 RX ORDER — SODIUM CHLORIDE, SODIUM LACTATE, POTASSIUM CHLORIDE, CALCIUM CHLORIDE 600; 310; 30; 20 MG/100ML; MG/100ML; MG/100ML; MG/100ML
125 INJECTION, SOLUTION INTRAVENOUS CONTINUOUS
Status: DISCONTINUED | OUTPATIENT
Start: 2021-06-21 | End: 2021-06-25

## 2021-06-21 RX ORDER — ATROPA BELLADONNA AND OPIUM 16.2; 3 MG/1; MG/1
1 SUPPOSITORY RECTAL
Status: DISCONTINUED | OUTPATIENT
Start: 2021-06-21 | End: 2021-06-21

## 2021-06-21 RX ORDER — PHENAZOPYRIDINE HYDROCHLORIDE 200 MG/1
200 TABLET, FILM COATED ORAL
Status: COMPLETED | OUTPATIENT
Start: 2021-06-21 | End: 2021-06-24

## 2021-06-21 RX ORDER — FAMOTIDINE 20 MG/1
20 TABLET, FILM COATED ORAL DAILY
Status: DISCONTINUED | OUTPATIENT
Start: 2021-06-21 | End: 2021-06-27 | Stop reason: HOSPADM

## 2021-06-21 RX ORDER — HYDROMORPHONE HYDROCHLORIDE 2 MG/ML
2 INJECTION, SOLUTION INTRAMUSCULAR; INTRAVENOUS; SUBCUTANEOUS
Status: DISCONTINUED | OUTPATIENT
Start: 2021-06-21 | End: 2021-06-27 | Stop reason: HOSPADM

## 2021-06-21 RX ORDER — VANCOMYCIN HYDROCHLORIDE
1250 EVERY 8 HOURS
Status: DISCONTINUED | OUTPATIENT
Start: 2021-06-22 | End: 2021-06-22

## 2021-06-21 RX ORDER — VANCOMYCIN 2 GRAM/500 ML IN 0.9 % SODIUM CHLORIDE INTRAVENOUS
2000 ONCE
Status: COMPLETED | OUTPATIENT
Start: 2021-06-21 | End: 2021-06-21

## 2021-06-21 RX ADMIN — ACETAMINOPHEN 650 MG: 325 TABLET ORAL at 16:02

## 2021-06-21 RX ADMIN — Medication 10 ML: at 14:00

## 2021-06-21 RX ADMIN — SODIUM CHLORIDE, SODIUM LACTATE, POTASSIUM CHLORIDE, AND CALCIUM CHLORIDE 125 ML/HR: 600; 310; 30; 20 INJECTION, SOLUTION INTRAVENOUS at 11:28

## 2021-06-21 RX ADMIN — HYDROMORPHONE HYDROCHLORIDE 2 MG: 2 INJECTION, SOLUTION INTRAMUSCULAR; INTRAVENOUS; SUBCUTANEOUS at 11:21

## 2021-06-21 RX ADMIN — ENOXAPARIN SODIUM 40 MG: 40 INJECTION SUBCUTANEOUS at 21:58

## 2021-06-21 RX ADMIN — Medication 10 ML: at 06:19

## 2021-06-21 RX ADMIN — Medication 10 ML: at 21:59

## 2021-06-21 RX ADMIN — PHENAZOPYRIDINE HYDROCHLORIDE 200 MG: 200 TABLET ORAL at 18:34

## 2021-06-21 RX ADMIN — FAMOTIDINE 20 MG: 20 TABLET, FILM COATED ORAL at 11:21

## 2021-06-21 RX ADMIN — VANCOMYCIN HYDROCHLORIDE 2000 MG: 10 INJECTION, POWDER, LYOPHILIZED, FOR SOLUTION INTRAVENOUS at 18:31

## 2021-06-21 RX ADMIN — SODIUM CHLORIDE, SODIUM LACTATE, POTASSIUM CHLORIDE, AND CALCIUM CHLORIDE 125 ML/HR: 600; 310; 30; 20 INJECTION, SOLUTION INTRAVENOUS at 21:57

## 2021-06-21 RX ADMIN — Medication 1 CAPSULE: at 11:21

## 2021-06-21 RX ADMIN — ACETAMINOPHEN 650 MG: 325 TABLET ORAL at 08:09

## 2021-06-21 RX ADMIN — HYDROMORPHONE HYDROCHLORIDE 2 MG: 2 INJECTION, SOLUTION INTRAMUSCULAR; INTRAVENOUS; SUBCUTANEOUS at 16:03

## 2021-06-21 RX ADMIN — OXYBUTYNIN CHLORIDE 10 MG: 5 TABLET, EXTENDED RELEASE ORAL at 08:09

## 2021-06-21 RX ADMIN — ACETAMINOPHEN 650 MG: 325 TABLET ORAL at 00:34

## 2021-06-21 RX ADMIN — LEVOFLOXACIN 750 MG: 5 INJECTION, SOLUTION INTRAVENOUS at 16:42

## 2021-06-21 RX ADMIN — HYDROMORPHONE HYDROCHLORIDE 2 MG: 2 INJECTION, SOLUTION INTRAMUSCULAR; INTRAVENOUS; SUBCUTANEOUS at 21:58

## 2021-06-21 RX ADMIN — CEFEPIME HYDROCHLORIDE 2 G: 2 INJECTION, POWDER, FOR SOLUTION INTRAVENOUS at 18:26

## 2021-06-21 NOTE — ED NOTES
TRANSFER - OUT REPORT:    Verbal report given to Sarabjit Blair RN(name) on Concepcion Ahumada  being transferred to CVT stepdown(unit) for routine progression of care       Report consisted of patients Situation, Background, Assessment and   Recommendations(SBAR). Information from the following report(s) SBAR, Kardex, ED Summary, Intake/Output, MAR, Accordion and Recent Results was reviewed with the receiving nurse. Lines:   Peripheral IV 06/20/21 Right Wrist (Active)   Site Assessment Clean, dry, & intact 06/20/21 1725   Phlebitis Assessment 0 06/20/21 1725   Infiltration Assessment 0 06/20/21 1725   Dressing Status Clean, dry, & intact 06/20/21 1725   Hub Color/Line Status Green 06/20/21 1725       Peripheral IV 06/20/21 Right Antecubital (Active)   Site Assessment Clean, dry, & intact 06/20/21 1715   Phlebitis Assessment 0 06/20/21 1715   Infiltration Assessment 0 06/20/21 1715   Dressing Status Clean, dry, & intact 06/20/21 1715   Hub Color/Line Status Green 06/20/21 1715        Opportunity for questions and clarification was provided.       Patient transported with:  Registered Nurse

## 2021-06-21 NOTE — CONSULTS
[unfilled]       ASSESSMENT:     UTI/Sepsis   S/p 6/16/21 Left ureteroscopy with laser lithotripsy with Dr. Quentin Magdaleno   S/p stent self removed 6/20   CT 6/21:  Trace left hydronephrosis and hydroureter, no obstruction    UA Neg Nitrites, small LE, 1+ bacteria, few trichomonas    Ucx pending 6/20 pending    WBC 14.3<15.4   Creat 0.55   Tmax 100.6 and tachycardic     Anxiety  PCOS    PLAN:    Appreciate overall management per primary   CT shows trace L hydro and no obstruction   Patient continues to c/o discomfort, refuses Toradol, ordered Pyridium   Urine culture pending, currently on Levaquin  Manage fevers and tachycardia, trend labs  Following    Follow up arranged? Pending course       YOEL Ruiz NP-BC  [facility], [facility]   Pager [phone]    I have seen and examined this patient independently, I reviewed pertinent labs and imaging, and I agree with the assessing provider's assessment and plan as outlined above, with ammendments as follows:     Sepsis after stent pull  Needs Abx + time   Reviewed CT, some mild ureterectasis, does not appear obstructive, would observe     Rehana Mendiola MD  [facility], [facility] 32  Pager [phone]    June 21, 2021 1:11 PM        Chief Complaint   Patient presents with    Flank Pain       HISTORY OF PRESENT ILLNESS:  Zurdo Miranda is a 31 y.o. female who is seen in consultation as referred by Dr. Mamie Morton  for UTI/Sepsis following Urology procedure. Patient presented to the ED 5/20 with complaints of left flank pain, n/v/f/c for the past 24 hours. Patient had a lithotripsy and stent placement done with Dr Quentin Magdaleno on 6/16. She was advised to remove her stent at home 6/19 and after successfully removing the stent, she has since experienced increasing flank pain and fever/chills. No known sick exposures. No changes to her urine output. No other complaints are reported at this time.   Patient is being followed by [facility] for kidney/ureteral stones but no other  history. Patient denies any previous urinary symptoms, hematuria, problems with emptying or any other symptoms. Patient continues to complain of left flank pain and poor appetite due to nausea. Denies any vomiting today.           Past Medical History:   Diagnosis Date    Anxiety     Gestational diabetes     History of PCOS        Past Surgical History:   Procedure Laterality Date    HX  SECTION  , ,     HX UROLOGICAL  2021    CYSTOSCOPY,LEFT URETEROSCOPY, LASER,LEFT STENT EXCHANGE retrograde pyleogram; Dr. Diaz Smith History     Tobacco Use    Smoking status: Never Smoker    Smokeless tobacco: Never Used   Substance Use Topics    Alcohol use: Yes     Comment: rarely    Drug use: No       Allergies   Allergen Reactions    Vicodin [Hydrocodone-Acetaminophen] Anxiety       Family History   Problem Relation Age of Onset    Cancer Paternal Grandfather         lung- smoker    Cancer Paternal Grandmother         unknown    Cancer Mother         breast- unknown age       Current Facility-Administered Medications   Medication Dose Route Frequency Provider Last Rate Last Admin    lactated Ringers infusion  125 mL/hr IntraVENous CONTINUOUS Eda Boone  mL/hr at 21 1128 125 mL/hr at 21 1128    HYDROmorphone (DILAUDID) injection 2 mg  2 mg IntraVENous Q4H PRN Eda Boone MD   2 mg at 21 1121    L. acidophilus,casei,rhamnosus (BIO-K PLUS) capsule 1 Capsule  1 Capsule Oral DAILY Eda Boone MD   1 Capsule at 21 1121    famotidine (PEPCID) tablet 20 mg  20 mg Oral DAILY Eda Boone MD   20 mg at 21 1121    sodium chloride (NS) flush 5-10 mL  5-10 mL IntraVENous PRN Neil Irizarry MD        levoFLOXacin (LEVAQUIN) 750 mg in D5W IVPB  750 mg IntraVENous Q24H Neil Irizarry MD   IV Completed at 21 193    oxybutynin chloride XL (DITROPAN XL) tablet 10 mg  10 mg Oral DAILY Kimo Solis MD   10 mg at 06/21/21 0809    sodium chloride (NS) flush 5-40 mL  5-40 mL IntraVENous Q8H John Russell MD   10 mL at 06/21/21 5268    sodium chloride (NS) flush 5-40 mL  5-40 mL IntraVENous PRN Kimo Solis MD        acetaminophen (TYLENOL) tablet 650 mg  650 mg Oral Q6H PRN Kimo Solis MD   650 mg at 06/21/21 2601    Or    acetaminophen (TYLENOL) suppository 650 mg  650 mg Rectal Q6H PRN Kimo Solis MD        polyethylene glycol (MIRALAX) packet 17 g  17 g Oral DAILY PRN Kimo Solis MD        ondansetron (ZOFRAN ODT) tablet 4 mg  4 mg Oral Q8H PRN Kimo Solis MD        Or    ondansetron (ZOFRAN) injection 4 mg  4 mg IntraVENous Q6H PRN John Ha MD        enoxaparin (LOVENOX) injection 40 mg  40 mg SubCUTAneous Q24H Kimo Solis MD             Review of Systems:  ROS is:    Negative for: Ophthalmologic issues, ENT issues, Cardiovascular issues, respiratory issues, GI issues, neurologic issues, hematoogic issues, skin lesions, musculoskeletal issues, psychiatric issues  Exceptions: yes    Positive for:    Left flank pain, nausea           PHYSICAL EXAMINATION:     Visit Vitals  /83   Pulse (!) 128   Temp 99.5 °F (37.5 °C)   Resp 20   Ht 5' 3\" (1.6 m)   Wt 115.5 kg (254 lb 10.1 oz)   SpO2 94%   BMI 45.11 kg/m²     Constitutional: Well developed, well-nourished female in no acute distress. HEENT: Normocephalic, Atraumatic   CV:   no edema   Respiratory: No respiratory distress or difficulties breathing   Abdomen:  Soft and nontender.  Female:  CVA tenderness: left; no suprapubic tenderness   No goins in place   Skin:  Normal color. No evidence of jaundice. Neuro/Psych:  Patient with appropriate affect. Alert and oriented. CT A/P 6/20    IMPRESSION  1. Trace left hydronephrosis and hydroureter, without obstructing stone.  There  is ureteral wall thickening and enhancement, which may be reactive from the  recent reported stent placement/removal versus infection.  -No definite evidence for pyelonephritis     2. Hepatic steatosis and hepatomegaly.     3. Mild splenomegaly.     4. Colonic diverticulosis.       REVIEW OF LABS AND IMAGING:         Labs: Results:   Chemistry    Recent Labs     06/21/21 0350 06/20/21 1720   * 115*    134*   K 3.7 4.1    99*   CO2 27 28   BUN 4* 5*   CREA 0.55* 0.64   CA 8.4* 9.3   AGAP 5 7   BUCR 7* 8*   AP  --  69   TP  --  8.5*   ALB  --  4.0   GLOB  --  4.5*   AGRAT  --  0.9      CBC w/Diff Recent Labs     06/21/21  0350 06/20/21 1720   WBC 14.3* 15.4*   RBC 3.80* 4.61   HGB 10.9* 13.2   HCT 32.1* 38.8    399   GRANS 74* 79*   LYMPH 16* 14*   EOS 0 0      Cultures Recent Labs     06/20/21 1720 06/20/21 1715   CULT NO GROWTH AFTER 13 HOURS NO GROWTH AFTER 13 HOURS     All Micro Results     Procedure Component Value Units Date/Time    CULTURE, BLOOD [[id number]] Collected: 06/20/21 1715    Order Status: Completed Specimen: Blood Updated: 06/21/21 0656     Special Requests: NO SPECIAL REQUESTS        Culture result: NO GROWTH AFTER 13 HOURS       CULTURE, BLOOD [[id number]] Collected: 06/20/21 1720    Order Status: Completed Specimen: Blood Updated: 06/21/21 0656     Special Requests: NO SPECIAL REQUESTS        Culture result: NO GROWTH AFTER 13 HOURS       CULTURE, URINE [[id number]] Collected: 06/20/21 1804    Order Status: Completed Specimen: Urine from Clean catch Updated: 06/20/21 1816            Urinalysis Color   Date Value Ref Range Status   06/20/2021 YELLOW   Final     Appearance   Date Value Ref Range Status   06/20/2021 CLOUDY   Final     Specific gravity   Date Value Ref Range Status   06/20/2021 1.013 1.005 - 1.030   Final     pH (UA)   Date Value Ref Range Status   06/20/2021 >8.5 (H) 5.0 - 8.0 Final     Protein   Date Value Ref Range Status   06/20/2021 Negative NEG mg/dL Final     Ketone   Date Value Ref Range Status   06/20/2021 Negative NEG mg/dL Final     Bilirubin Date Value Ref Range Status   06/20/2021 Negative NEG   Final     Blood   Date Value Ref Range Status   06/20/2021 TRACE (A) NEG   Final     Urobilinogen   Date Value Ref Range Status   06/20/2021 0.2 0.2 - 1.0 EU/dL Final     Nitrites   Date Value Ref Range Status   06/20/2021 Negative NEG   Final     Leukocyte Esterase   Date Value Ref Range Status   06/20/2021 SMALL (A) NEG   Final     Potassium   Date Value Ref Range Status   06/21/2021 3.7 3.5 - 5.5 mmol/L Final     Creatinine   Date Value Ref Range Status   06/21/2021 0.55 (L) 0.6 - 1.3 MG/DL Final     BUN   Date Value Ref Range Status   06/21/2021 4 (L) 7.0 - 18 MG/DL Final      Coagulation Lab Results   Component Value Date/Time    Prothrombin time 13.4 11/10/2019 11:45 AM    INR 1.0 11/10/2019 11:45 AM    aPTT 28.8 11/10/2019 11:45 AM

## 2021-06-21 NOTE — ROUTINE PROCESS
Bedside and Verbal shift change report given to Lauren Hernandez, [facility] (oncoming nurse) by Alesia Hermosillo RN, BSN (offgoing nurse). Report included the following information SBAR, Kardex, ED Summary, Intake/Output, MAR, Recent Results and Cardiac Rhythm Sinus Tach.     Alesia Hermosillo RN, BSN

## 2021-06-21 NOTE — ROUTINE PROCESS
TRANSFER - IN REPORT:    Verbal report received from [place], RN(name) on Precilla Clunes  being received from Emergency department (unit) for routine progression of care      Report consisted of patients Situation, Background, Assessment and   Recommendations(SBAR). Information from the following report(s) SBAR, Kardex, ED Summary, Intake/Output, MAR, Recent Results and Cardiac Rhythm Sinus Tach was reviewed with the receiving nurse. Opportunity for questions and clarification was provided. Assessment completed upon patients arrival to unit and care assumed. No noted open areas. Noted tattoos over various area of the body. Weight 115.2 kg upon admission. Ambulated well from stretcher to bed. Alert and oriented times four. Denies pain and discomfort at this time. Lungs clear to ausculation. Oriented to call bell system and room. Verbalized 100% understanding. Currently in low bed with wheels locked, side rails times two, and call bell within reach for safety.     Ferdinand Montez RN, BSN

## 2021-06-21 NOTE — PROGRESS NOTES
conducted an initial consultation and Spiritual Assessment for Aditya Patel, who is a 31 y.o.,female. Patients Primary Language is: [place]. According to the patients EMR Mandaeism Affiliation is: No preference. The reason the Patient came to the hospital is:   Patient Active Problem List    Diagnosis Date Noted    UTI (urinary tract infection) 06/20/2021    Tachycardia 06/20/2021    Fever 06/20/2021        The  provided the following Interventions:  Initiated a relationship of care and support with patient in room 2314 today. Listened empathically as patient shared that she was tired but doing ok with everything else. Patient does not have an advance directive at this time. Provided information about Spiritual Care Services. Offered prayer and assurance of continued prayers on patients behalf. The following outcomes were achieved:  Patient shared limited information about her medical narrative and spiritual journey/beliefs. Patient processed feeling about current hospitalization. Patient expressed gratitude for pastoral care visit. Assessment:  Patient does not have any Roman Catholic/cultural needs that will affect patients preferences in health care. There are no further spiritual or Roman Catholic issues which require Spiritual Care Services interventions at this time. Plan:  Chaplains will continue to follow and will provide pastoral care on an as needed/requested basis    . Bubba Rogers   Spiritual Care   [phone]

## 2021-06-21 NOTE — PROGRESS NOTES
Problem: Pain  Goal: *Control of Pain  Outcome: Progressing Towards Goal     Problem: Falls - Risk of  Goal: *Absence of Falls  Description: Document Mark Fall Risk and appropriate interventions in the flowsheet.   Outcome: Progressing Towards Goal  Note: Fall Risk Interventions:                 Elimination Interventions: Call light in reach, Toilet paper/wipes in reach, Toileting schedule/hourly rounds              Problem: Urinary Tract Infection - Adult  Goal: *Absence of infection signs and symptoms  Outcome: Progressing Towards Goal     Problem: Patient Education: Go to Patient Education Activity  Goal: Patient/Family Education  Outcome: Progressing Towards Goal

## 2021-06-21 NOTE — PROGRESS NOTES
Reason for Admission:  UTI (urinary tract infection) [N39.0]  Tachycardia [R00.0]  Fever [R50.9]                 RUR Score:    9%            Plan for utilizing home health:    Not at this time. Pt does not qualify as she is actively working. Likelihood of Readmission:   LOW                         Transition of Care Plan:              Initial assessment completed with patient. Cognitive status of patient: oriented to time, place, person and situation. Face sheet information confirmed:  yes. The patient designates her sister Marcela Guevara to participate in her discharge plan and to receive any needed information. This patient lives in a single family home with her children. Patient is able to navigate steps as needed. Prior to hospitalization, patient was considered to be independent with ADLs/IADLS : yes . Patient has a current ACP document on file: no      Healthcare Decision Maker:     Click here to complete [address] including selection of the Healthcare Decision Maker Relationship (ie \"Primary\")    The pt will transport herself home upon discharge. The patient already has no medical equipment available in the home. Patient is not currently active with home health. Patient has not stayed in a skilled nursing facility or rehab. This patient is on dialysis :no    Currently, the discharge plan is home. The patient states that she can obtain her medications from the pharmacy, and take her medications as directed. Patient's current insurance is [employer]. Care Management Interventions  PCP Verified by CM: Yes  Mode of Transport at Discharge: Self  Transition of Care Consult (CM Consult): Discharge Planning  Physical Therapy Consult: No  Occupational Therapy Consult: No  Current Support Network:  Other (lives with her children)  Confirm Follow Up Transport: Self  Discharge Location  Discharge Placement: [address] [facility] Manager  [phone]

## 2021-06-21 NOTE — ED NOTES
TRANSFER - OUT REPORT:    Verbal report given to Belia SMALL(name) on Maria G Magallanes  being transferred to (unit) for routine progression of care       Report consisted of patients Situation, Background, Assessment and   Recommendations(SBAR). Information from the following report(s) SBAR, Kardex, ED Summary, Intake/Output, MAR, Accordion and Recent Results was reviewed with the receiving nurse. Lines:   Peripheral IV 06/20/21 Right Wrist (Active)   Site Assessment Clean, dry, & intact 06/20/21 1725   Phlebitis Assessment 0 06/20/21 1725   Infiltration Assessment 0 06/20/21 1725   Dressing Status Clean, dry, & intact 06/20/21 1725   Hub Color/Line Status Green 06/20/21 1725       Peripheral IV 06/20/21 Right Antecubital (Active)   Site Assessment Clean, dry, & intact 06/20/21 1715   Phlebitis Assessment 0 06/20/21 1715   Infiltration Assessment 0 06/20/21 1715   Dressing Status Clean, dry, & intact 06/20/21 1715   Hub Color/Line Status Green 06/20/21 1715        Opportunity for questions and clarification was provided.       Patient transported with:  Registered Nurse

## 2021-06-21 NOTE — CONSULTS
Infectious Disease Consultation Note        Reason: Sepsis    Current abx Prior abx   Levofloxacin since 6/20/2021      Lines:       Assessment :    31 y.o. female with past medical history of anxiety, PCOS, gestational diabetes admitted to [facility] on 6/20/2021 with flank pain, fever. S/p cystoscopy, retrograde pyelogram with interpretation and JJ ureteral stent placement on 5/21/21    Urine culture 5/22-mixed culture  Urine culture 5/28/2021-10,000 colonies of more than 2 different organisms. left ureteroscopy with laser lithotripsy on  6/16/2021    CT abd/pelvis 6/20- Trace left hydronephrosis and hydroureter, without obstructing stone      Clinical presentation consistent with sepsis-President admission likely secondary to complicated UTI, left ureteritis    Multiple outpatient antibiotics have likely interfered with the urine culture results. Multiple recent urine culture positive for mixed organisms as noted above. Increased vaginal discharge concerning for vaginitis-  Recommend to rule out GC/chlamydia coinfection. Trichomoniasis- s/p metronidazole 2 g on 6/20/21    Looking at patient's history, she is at high risk for infection with resistant gram-negative/gram-positive pathogens. We will broaden antibiotic coverage to include this possibility since patient is having high-grade fever, clinical worsening on levofloxacin monotherapy    Recent hospitalization puts patient at risk of COVID-19 infection. Agree with ruling this out. Currently no hypoxia or pulmonary infiltrates to suggest active COVID-19 pneumonia      Recommendations:    1. Continue levofloxacin. Add cefepime, vancomycin  2. Follow-up results of blood culture, urine culture 6/20  3. Will need clinical/radiographic monitoring to rule out evolving left kidney abscess  4. Follow-up the results of COVID-19 test  5.  Send urine GC/chlamydia PCR, vaginal swab for GC/chlamydia      Thank you for consultation request. Above plan was discussed in details with patient,  and dr Nuris Fisher. Please call me if any further questions or concerns. Will continue to participate in the care of this patient. HPI:    31 y.o. female with past medical history of anxiety, PCOS, gestational diabetes admitted to [facility] on 6/20/2021 with flank pain, fever. I Obtained history by talking to patient, review of records. on 5/5/2021 experienced abdominal pain, and presented to [facility]. CT scan revealed left upper ureteric calculus with mild proximal ureteral pelvocaliectasis. Patient was discharged to home with Norco, Zofran, Flomax. .  She presented back to [facility] 5/20/2021 where she was found to have obstructing renal stone with hydronephrosis and UTI symptoms, for which she was transferred to [facility].  5/21/2021, she underwent cystoscopy, retrograde pyelogram with interpretation and JJ ureteral stent placement. She was discharged to home with Cipro, Percocet, MiraLAX, Sanctura, vitamin D3 supplements. She followed up with [facility] [place] 6/2/21, noted poorly tolerating JJ stent, restarted Flomax, started on oxybutynin. Patient was scheduled for left ureteroscopy with laser lithotripsy, which she underwent 6/16/2021 per Dr. Hermann Olivier. During this procedure, stent with a string was placed which she was instructed to remove yesterday, which she did without issue. on 6/19 evening she began to feel sick, staying in bed. Yesterday morning she awoke with fevers sweats chills, she took her temperature was 104. She experienced associated nausea, generalized weakness, and left lower back pain. She drove herself to the ED, where she is noted to have pyuria on urinalysis, temperature 102.7, leukocytosis with neutrophil predominance, and pulse as high as 168.   CT abdomen and pelvis revealed trace left hydronephrosis and hydroureter without obstructing stone; ureteral wall thickening and enhancement, no definite evidence for pyelonephritis.      Patient endorses back pain is \"like a knife stabbing, and throbbing. \"  8-9 out of 10 at its worse, better with medication she received in the ED now a 6 out of 10. She denies aggravating factors. Heating pad was no help. Pain nonradiating. She states bladder spasms she was experiencing in the past have now resolved. She does have a headache. In the ED, she received cefepime 2 g IV x1, Levaquin 750 mg IV x1, and 3 L normal saline with improvement of heart rate to 137. Patient also noted to have slightly elevated D-dimer, although she was not tachypneic or short of breath, no hypoxia. Given concern for PE, 1 dose treatment dose Lovenox was given.    She was continued on levofloxacin. Currently she is noted to have temperature of 102.9. COVID-19 test was sent. I have been consulted for further recommendations. Patient complains of left flank pain. She has also noted increased vaginal discharge. Denies increasing chest pain, shortness of breath. Was tested negative for Covid 2 days prior to her recent urological procedure. Has not taken Covid vaccine. Past Medical History:   Diagnosis Date    Anxiety     Gestational diabetes     History of PCOS        Past Surgical History:   Procedure Laterality Date    HX  SECTION  , ,     HX UROLOGICAL  2021    CYSTOSCOPY,LEFT URETEROSCOPY, LASER,LEFT STENT EXCHANGE retrograde pyleogram; Dr. Rodriguez        home Medication List    Details   oxybutynin chloride XL (DITROPAN XL) 10 mg CR tablet Take 1 Tablet by mouth daily. Qty: 30 Tablet, Refills: 1      acetaminophen (TYLENOL) 325 mg tablet Take 2 Tabs by mouth every four (4) hours as needed for Pain. Qty: 20 Tab, Refills: 0      prenatal multivit-ca-min-fe-fa (PRENATAL VITAMIN) tab Take 1 Tab by mouth daily.   Qty: 30 Tab, Refills: 0             Current Facility-Administered Medications   Medication Dose Route Frequency    lactated Ringers infusion  125 mL/hr IntraVENous CONTINUOUS    HYDROmorphone (DILAUDID) injection 2 mg  2 mg IntraVENous Q4H PRN    L. acidophilus,casei,rhamnosus (BIO-K PLUS) capsule 1 Capsule  1 Capsule Oral DAILY    famotidine (PEPCID) tablet 20 mg  20 mg Oral DAILY    phenazopyridine (PYRIDIUM) tablet 200 mg  200 mg Oral TIDPC    sodium chloride (NS) flush 5-10 mL  5-10 mL IntraVENous PRN    levoFLOXacin (LEVAQUIN) 750 mg in D5W IVPB  750 mg IntraVENous Q24H    oxybutynin chloride XL (DITROPAN XL) tablet 10 mg  10 mg Oral DAILY    sodium chloride (NS) flush 5-40 mL  5-40 mL IntraVENous Q8H    sodium chloride (NS) flush 5-40 mL  5-40 mL IntraVENous PRN    acetaminophen (TYLENOL) tablet 650 mg  650 mg Oral Q6H PRN    Or    acetaminophen (TYLENOL) suppository 650 mg  650 mg Rectal Q6H PRN    polyethylene glycol (MIRALAX) packet 17 g  17 g Oral DAILY PRN    ondansetron (ZOFRAN ODT) tablet 4 mg  4 mg Oral Q8H PRN    Or    ondansetron (ZOFRAN) injection 4 mg  4 mg IntraVENous Q6H PRN    enoxaparin (LOVENOX) injection 40 mg  40 mg SubCUTAneous Q24H       Allergies: Vicodin [hydrocodone-acetaminophen]    Family History   Problem Relation Age of Onset    Cancer Paternal Grandfather         lung- smoker    Cancer Paternal Grandmother         unknown    Cancer Mother         breast- unknown age     Social History     Socioeconomic History    Marital status: SINGLE     Spouse name: Not on file    Number of children: Not on file    Years of education: Not on file    Highest education level: Not on file   Occupational History    Not on file   Tobacco Use    Smoking status: Never Smoker    Smokeless tobacco: Never Used   Substance and Sexual Activity    Alcohol use: Yes     Comment: rarely    Drug use: No    Sexual activity: Yes     Partners: Male     Birth control/protection: Condom   Other Topics Concern    Not on file   Social History Narrative    Not on file     Social Determinants of Health     Financial Resource Strain:     Difficulty of Paying Living Expenses:    Food Insecurity:     Worried About Running Out of Food in the Last Year:     [address] in the Last Year:    Transportation Needs:     Lack of Transportation (Medical):  Lack of Transportation (Non-Medical):    Physical Activity:     Days of Exercise per Week:     Minutes of Exercise per Session:    Stress:     Feeling of Stress :    Social Connections:     Frequency of Communication with Friends and Family:     Frequency of Social Gatherings with Friends and Family:     Attends Adventism Services:     Active Member of Clubs or Organizations:     Attends Club or Organization Meetings:     Marital Status:    Intimate Partner Violence:     Fear of Current or Ex-Partner:     Emotionally Abused:     Physically Abused:     Sexually Abused:      Social History     Tobacco Use   Smoking Status Never Smoker   Smokeless Tobacco Never Used        Temp (24hrs), Av °F (37.8 °C), Min:98.1 °F (36.7 °C), Max:102.9 °F (39.4 °C)    Visit Vitals  /85   Pulse (!) 141   Temp (!) 102.9 °F (39.4 °C)   Resp 19   Ht 5' 3\" (1.6 m)   Wt 115.5 kg (254 lb 10.1 oz)   SpO2 (!) 89%   BMI 45.11 kg/m²       ROS: 12 point ROS obtained in details. Pertinent positives as mentioned in HPI,   otherwise negative    Physical Exam:    Vitals signs and nursing note reviewed. Constitutional:       General: laying on bed, AAOx3, NAD       HENT:      Head: Normocephalic. Eyes:      Conjunctiva/sclera: Conjunctivae normal.      Neck:      Musculoskeletal: Normal range of motion and neck supple. Cardiovascular:      Rate and Rhythm: Normal rate and regular rhythm on monitor  Chest:      Bilateral chest movements equal.    Abdominal:      General: There is no distension. Palpations: Abdomen is soft. Tenderness: There is no abdominal tenderness. There is no rebound. Musculoskeletal: Normal range of motion. General: No tenderness. Skin:     General: Skin is warm and dry.       Findings: No rash.   Neurological:      Mental Status: He is alert and oriented to person, place, and time. Cranial Nerves: No cranial nerve deficit. Motor: No abnormal muscle tone. Coordination: Coordination normal.   Psychiatric:         Behavior: Behavior normal.         Thought Content: Thought content normal.         Judgment: Judgment normal.   Labs: Results:   Chemistry Recent Labs     06/21/21  0350 06/20/21  1720   * 115*    134*   K 3.7 4.1    99*   CO2 27 28   BUN 4* 5*   CREA 0.55* 0.64   CA 8.4* 9.3   AGAP 5 7   BUCR 7* 8*   AP  --  69   TP  --  8.5*   ALB  --  4.0   GLOB  --  4.5*   AGRAT  --  0.9      CBC w/Diff Recent Labs     06/21/21  0350 06/20/21  1720   WBC 14.3* 15.4*   RBC 3.80* 4.61   HGB 10.9* 13.2   HCT 32.1* 38.8    399   GRANS 74* 79*   LYMPH 16* 14*   EOS 0 0      Microbiology Recent Labs     06/20/21  1720 06/20/21  1715   CULT NO GROWTH AFTER 13 HOURS NO GROWTH AFTER 13 HOURS          RADIOLOGY:    All available imaging studies/reports in [facility] for this admission were reviewed      Disclaimer: Sections of this note are dictated utilizing voice recognition software, which may have resulted in some phonetic based errors in grammar and contents. Even though attempts were made to correct all the mistakes, some may have been missed, and remained in the body of the document. If questions arise, please contact our department.     Dr. Ronny Mosley, Infectious Disease Specialist  [phone]  June 21, 2021  4:19 PM

## 2021-06-21 NOTE — H&P
History & Physical    Patient: Ida Estes MRN: [id number]  CSN: [id number]    YOB: 1990  Age: 31 y.o. Sex: female      DOA: 6/20/2021    Chief Complaint   Patient presents with    Flank Pain          HPI:     Ida Estes is a 31 y.o. female with past medical history of anxiety, PCOS, gestational diabetes, who on 5/5/2021 experienced abdominal pain, and presented to [facility]. CT scan revealed left upper ureteric calculus with mild proximal ureteral pelvocaliectasis. Patient was discharged to home with Norco, Zofran, Flomax. .  She presented back to [facility] 5/20/2021 where she was found to have obstructing renal stone with hydronephrosis and UTI symptoms, for which she was transferred to [facility].  5/21/2021, she underwent cystoscopy, retrograde pyelogram with interpretation and JJ ureteral stent placement. She was discharged to home with Cipro, Percocet, MiraLAX, Sanctura, vitamin D3 supplements. She followed up with [facility] [place] 6/2/21, noted poorly tolerating JJ stent, restarted Flomax, started on oxybutynin. Patient was scheduled for left ureteroscopy with laser lithotripsy, which she underwent 6/16/2021 per Dr. Verner Stager. During this procedure, stent with a string was placed which she was instructed to remove yesterday, which she did without issue. Yesterday evening she began to feel sick, staying in bed. This morning she awoke with fevers sweats chills, she took her temperature was 104. She experienced associated nausea, generalized weakness, and left lower back pain. She drove herself to the ED, where she is noted to have pyuria on urinalysis, temperature 102.7, leukocytosis with neutrophil predominance, and pulse as high as 168. CT abdomen and pelvis revealed trace left hydronephrosis and hydroureter without obstructing stone; ureteral wall thickening and enhancement, no definite evidence for pyelonephritis.      Patient endorses back pain is \"like a knife stabbing, and throbbing. \"  8-9 out of 10 at its worse, better with medication she received in the ED now a 6 out of 10. She denies aggravating factors. Heating pad was no help. Pain nonradiating. She states bladder spasms she was experiencing in the past have now resolved. She does have a headache. She did tolerate p.o. fluids in the ED. She states that while in the hospital her heart rate tends to be high, she feels anxious and has also had some high blood pressures. Also she states her mom  here at [place]. In the ED, she received cefepime 2 g IV x1, Levaquin 750 mg IV x1, and 3 L normal saline with improvement of heart rate to 137. Patient also noted to have slightly elevated D-dimer, although she is not tachypneic or short of breath, no hypoxia. Given concern for PE, 1 dose treatment dose Lovenox was given. She also received Tylenol, Dilaudid.         Past Medical History:   Diagnosis Date    Anxiety     Gestational diabetes     History of PCOS        Past Surgical History:   Procedure Laterality Date    HX  SECTION  , ,     HX UROLOGICAL  2021    CYSTOSCOPY,LEFT URETEROSCOPY, LASER,LEFT STENT EXCHANGE retrograde pyleogram; Dr. Helen Petersen       Family History   Problem Relation Age of Onset    Cancer Paternal Grandfather         lung- smoker    Cancer Paternal Grandmother         unknown    Cancer Mother         breast- unknown age       Social History     Socioeconomic History    Marital status: SINGLE     Spouse name: Not on file    Number of children: Not on file    Years of education: Not on file    Highest education level: Not on file   Tobacco Use    Smoking status: Never Smoker    Smokeless tobacco: Never Used   Substance and Sexual Activity    Alcohol use: Yes     Comment: rarely    Drug use: No    Sexual activity: Yes     Partners: Male     Birth control/protection: Condom     Social Determinants of Health     Financial Resource Strain:  Difficulty of Paying Living Expenses:    Food Insecurity:     Worried About Running Out of Food in the Last Year:     Ran Out of Food in the Last Year:    Transportation Needs:     Lack of Transportation (Medical):  Lack of Transportation (Non-Medical):    Physical Activity:     Days of Exercise per Week:     Minutes of Exercise per Session:    Stress:     Feeling of Stress :    Social Connections:     Frequency of Communication with Friends and Family:     Frequency of Social Gatherings with Friends and Family:     Attends Restorationist Services:     Active Member of Clubs or Organizations:     Attends Club or Organization Meetings:     Marital Status:        Prior to Admission medications    Medication Sig Start Date End Date Taking? Authorizing Provider   oxybutynin chloride XL (DITROPAN XL) 10 mg CR tablet Take 1 Tablet by mouth daily. 6/3/21   Ludwin Delgado MD   acetaminophen (TYLENOL) 325 mg tablet Take 2 Tabs by mouth every four (4) hours as needed for Pain. 11/10/19   Ashleigh Cuevas MD   prenatal multivit-ca-min-fe-fa (PRENATAL VITAMIN) tab Take 1 Tab by mouth daily. 10/27/19   ROGELIO Mae       Allergies   Allergen Reactions    Vicodin [Hydrocodone-Acetaminophen] Anxiety       Review of Systems  GENERAL: + fevers + chills. + Sweats  HEENT: No change in vision, no earache, sore throat or sinus congestion. NECK: No pain or stiffness. CARDIOVASCULAR: No chest pain or pressure. No palpitations. PULMONARY: No shortness of breath, cough or wheeze. GASTROINTESTINAL: No abdominal pain +nausea. No vomiting or diarrhea, melena or       bright red blood per rectum. GENITOURINARY: +urinary frequency. no urgency, hesitancy or dysuria. MUSCULOSKELETAL: No joint or muscle pain. + back pain. no recent trauma. DERMATOLOGIC: No rash, no itching, no lesions. ENDOCRINE: No polyuria, polydipsia, no heat or cold intolerance. No recent change in    weight.    HEMATOLOGICAL: No anemia or easy bruising or bleeding. NEUROLOGIC: + headache. no seizures, numbness, tingling or weakness. PSYCHIATRIC: No depression, anxiety, mood disorder, no loss of interest in normal       activity or change in sleep pattern. Physical Exam:     Physical Exam:  Visit Vitals  /63   Pulse (!) 125   Temp 98.1 °F (36.7 °C)   Resp 23   Ht 5' 3\" (1.6 m)   Wt 108.9 kg (240 lb)   SpO2 100%   BMI 42.51 kg/m²      O2 Device: None (Room air)    Temp (24hrs), Av.8 °F (37.7 °C), Min:98.1 °F (36.7 °C), Max:102.7 °F (39.3 °C)       1901 -  07  In: -   Out: 300 [Urine:300]     701 - 1900  In:  [I.V.:]  Out: -     Sitting up in bed speaking in full sentences on room air, no acute distress. HEENT normocephalic atraumatic pupils equally round reactive to light. Nasal piercing. Oropharynx clear. Mucous membranes slightly dry  Tachycardic with regular rhythm  Lungs clear to auscultation bilaterally  Abdomen obese soft nontender nondistended normoactive bowel sounds  left lower back tenderness to palpation  Extremities no edema. DP 2+ bilaterally. No calf tenderness. Vernestine Abler' sign negative bilaterally  No focal deficit  No rash to visible skin.     Labs Reviewed:    Recent Results (from the past 24 hour(s))   POC LACTIC ACID    Collection Time: 21  5:17 PM   Result Value Ref Range    Lactic Acid (POC) 1.91 0.40 - 4.71 mmol/L   METABOLIC PANEL, COMPREHENSIVE    Collection Time: 21  5:20 PM   Result Value Ref Range    Sodium 134 (L) 136 - 145 mmol/L    Potassium 4.1 3.5 - 5.5 mmol/L    Chloride 99 (L) 100 - 111 mmol/L    CO2 28 21 - 32 mmol/L    Anion gap 7 3.0 - 18 mmol/L    Glucose 115 (H) 74 - 99 mg/dL    BUN 5 (L) 7.0 - 18 MG/DL    Creatinine 0.64 0.6 - 1.3 MG/DL    BUN/Creatinine ratio 8 (L) 12 - 20      GFR est AA >60 >60 ml/min/1.73m2    GFR est non-AA >60 >60 ml/min/1.73m2    Calcium 9.3 8.5 - 10.1 MG/DL    Bilirubin, total 0.5 0.2 - 1.0 MG/DL    ALT (SGPT) 35 13 - 56 U/L    AST (SGOT) 14 10 - 38 U/L    Alk. phosphatase 69 45 - 117 U/L    Protein, total 8.5 (H) 6.4 - 8.2 g/dL    Albumin 4.0 3.4 - 5.0 g/dL    Globulin 4.5 (H) 2.0 - 4.0 g/dL    A-G Ratio 0.9 0.8 - 1.7     CBC WITH AUTOMATED DIFF    Collection Time: 06/20/21  5:20 PM   Result Value Ref Range    WBC 15.4 (H) 4.6 - 13.2 K/uL    RBC 4.61 4.20 - 5.30 M/uL    HGB 13.2 12.0 - 16.0 g/dL    HCT 38.8 35.0 - 45.0 %    MCV 84.2 74.0 - 97.0 FL    MCH 28.6 24.0 - 34.0 PG    MCHC 34.0 31.0 - 37.0 g/dL    RDW 12.9 11.6 - 14.5 %    PLATELET 306 943 - 221 K/uL    MPV 8.8 (L) 9.2 - 11.8 FL    NEUTROPHILS 79 (H) 40 - 73 %    LYMPHOCYTES 14 (L) 21 - 52 %    MONOCYTES 6 3 - 10 %    EOSINOPHILS 0 0 - 5 %    BASOPHILS 0 0 - 2 %    ABS. NEUTROPHILS 12.2 (H) 1.8 - 8.0 K/UL    ABS. LYMPHOCYTES 2.1 0.9 - 3.6 K/UL    ABS. MONOCYTES 1.0 0.05 - 1.2 K/UL    ABS. EOSINOPHILS 0.0 0.0 - 0.4 K/UL    ABS.  BASOPHILS 0.0 0.0 - 0.1 K/UL    DF AUTOMATED     HCG QL SERUM    Collection Time: 06/20/21  5:20 PM   Result Value Ref Range    HCG, Ql. Negative NEG     D DIMER    Collection Time: 06/20/21  5:20 PM   Result Value Ref Range    D DIMER 0.56 (H) <0.46 ug/ml(FEU)   EKG, 12 LEAD, INITIAL    Collection Time: 06/20/21  5:24 PM   Result Value Ref Range    Ventricular Rate 158 BPM    Atrial Rate 158 BPM    P-R Interval 128 ms    QRS Duration 56 ms    Q-T Interval 302 ms    QTC Calculation (Bezet) 489 ms    Calculated P Axis 33 degrees    Calculated R Axis 96 degrees    Calculated T Axis 46 degrees    Diagnosis       Sinus tachycardia  Rightward axis  Borderline ECG  When compared with ECG of 27-OCT-2019 09:06,  No significant change was found     URINALYSIS W/ RFLX MICROSCOPIC    Collection Time: 06/20/21  6:04 PM   Result Value Ref Range    Color YELLOW      Appearance CLOUDY      Specific gravity 1.013 1.005 - 1.030      pH (UA) >8.5 (H) 5.0 - 8.0    Protein Negative NEG mg/dL    Glucose Negative NEG mg/dL    Ketone Negative NEG mg/dL    Bilirubin Negative NEG      Blood TRACE (A) NEG      Urobilinogen 0.2 0.2 - 1.0 EU/dL    Nitrites Negative NEG      Leukocyte Esterase SMALL (A) NEG     URINE MICROSCOPIC ONLY    Collection Time: 06/20/21  6:04 PM   Result Value Ref Range    WBC 36 to 50 0 - 5 /hpf    RBC 4 to 10 0 - 5 /hpf    Epithelial cells 4+ 0 - 5 /lpf    Bacteria 1+ (A) NEG /hpf    Trichomonas FEW (A) NEG         Procedures/imaging: see electronic medical records for all procedures/Xrays and details which were not copied into this note but were reviewed prior to creation of Plan        Assessment/Plan       1. Sepsis POA (fever, tachycardia, leukocytosis), sources include urine and lungs. Please follow blood and urine cultures. 2.  UTI complicated in the setting of recent stent for kidney stones, which patient removed yesterday at home. Trace left hydronephrosis and hydroureter on imaging today, no obstructing stone. Continue cefepime. Follow culture. 3.  Tachycardia, elevated D-dimer in the setting of recent procedure. Anxiety seems to be contributing to tachycardia. Unable to obtain CTA until Monday evening as she received IV contrast in the ED.  1 dose Lovenox treatment dose given in the ED. We will schedule CTA for tomorrow evening. 4.  Diverticulosis on imaging. No evidence of diverticulitis  5. Morbid obesity Body mass index is 42.51 kg/m². 6.  DVT prophylaxis  7. Full code. Admit to telemetry.     Time spent 70 minutes    Bruna Oakley MD  June 20, 2021

## 2021-06-21 NOTE — PROGRESS NOTES
6/21/2021 01:00- Nursing supervisor, Laly Liu RN called to report as a \"heads up\" that this patient is being admitted for an \"UTI\" and has a heart rate in the 120's-130's; that patient is \"very anxious\" and that Dr Marianela Tolbert is aware of it and that it is okay that patient be admitted to [address] [place] [address]. 01:45-- Report received from [facility], [address] [place] [address] on patient being admitted to [address] [place] [address], Room 218.    01:55- Nursing supervisor is notified after speaking to the charge nurse Josey Lane RN) regarding the MEWS score of Five and that the patient is reported to be \"very anxious\" -recommended that patient be held in ER until MEWS score is lower. Suggested anti anxiety med to the supervisor and she agreed to the suggestion and would speak to DR Marianela Tolbert. 02:00- This writer called RN [facility] and reported to hold the patient as the MEWS is a \"5\" which includes the resp rate of 28, heart rate of 132-126, Temp=100.6--It was reported to this writer that the patient is \"very anxious\" and may be a reason of high heart rate. Suggested possible anti anxiety medication. [facility], RN to speak to Dr Marianela Tolbert. 02:05- Dr Marianela Tolbert called and spoke to this writer about the patient being admitted to step down- that the patient is not being accepted (to [address] [place] [address])  Dr Marianela Tolbert reported that patient is anxious- this writer asked if the patient can have something for anxiety and Dr Marianela Tolbert reported that she did not want to \"sedate\" her patient-this writer responded that sedation is NOT what is being asked for but only something to mildly help the patient with anxiety.

## 2021-06-21 NOTE — ROUTINE PROCESS
Patient requesting narcotic pain medication. MD paged. Notified patient that there are no new orders at this time.     Robert Sanders RN, BSN

## 2021-06-21 NOTE — PROGRESS NOTES
Problem: Pain  Goal: *Control of Pain  Outcome: Progressing Towards Goal     Problem: Falls - Risk of  Goal: *Absence of Falls  Description: Document Mark Fall Risk and appropriate interventions in the flowsheet.   Outcome: Progressing Towards Goal  Note: Fall Risk Interventions:                                Problem: Urinary Tract Infection - Adult  Goal: *Absence of infection signs and symptoms  Outcome: Progressing Towards Goal     Problem: Patient Education: Go to Patient Education Activity  Goal: Patient/Family Education  Outcome: Progressing Towards Goal

## 2021-06-21 NOTE — PROGRESS NOTES
Bedside and Verbal shift change report given to STACI Aguillon (oncoming nurse) by Kevin Olvera RN (offgoing nurse). Report included the following information SBAR, Kardex, MAR, Recent Results and Cardiac Rhythm sinus tach . 0809: Pt c/o of 9/10 pain to left flank. Pt asking for pain medicine, tylenol is the only thing ordered at this time. Administered 650 mg of tylenol. Will continue to monitor. .    1121: Pt c/o 8/10 pain to left flank, Administered 2 mg dilaudid per orders. Will continue to monitor. 1605: Pt in bed c/o 9/10 pain to left flank. Temp 102.9, , /85, O2SAT 89% on room air. Administered 650 mg tylenol for fever, 2 mg dilaudid for pain. Pt now on 3 L NC, O2SAT recovery at 95%. Pt denies any SOB. MD paged to be made aware. Will continue to monitor. 1658: Educated pt on ICS. Pt tolerated and verbalized understanding. Will continue to monitor. 1805: Assisted MD at bedside w/ vaginal swab.     1920: Bedside and Verbal shift change report given to Kevin Olvera RN (oncoming nurse) by STACI Aguillon (offgoing nurse). Report included the following information SBAR, Kardex, MAR, Recent Results and Cardiac Rhythm sinus tach.

## 2021-06-21 NOTE — ROUTINE PROCESS
Bedside and Verbal shift change report given to Richelle Palma RN, BSN (oncoming nurse) by Gal Landa RN (offgoing nurse). Report included the following information SBAR, Kardex, ED Summary, Procedure Summary, Intake/Output, MAR, Recent Results and Cardiac Rhythm Sinus Tach.      Richelle Palma RN, BSN

## 2021-06-21 NOTE — PROGRESS NOTES
Hospitalist Progress Note    Patient: Logan Orta Age: 31 y.o. : 1990 MR#: [id number] SSN: [id number]  Date/Time: 2021 11:09 AM    DOA: 2021  PCP: Damion Singer NP    Subjective:     She continues to have recurrent fever. Still has left flank pain. Has Nausea and has not been eating. Dilaudid IV help. Has good urinary output   Otherwise, she has no respiratory distress, no shortness of  Breath, no hypoxia   Telemetry with sinus tachycardia at 120s. Lab with leukocytosis, Hgb/Hct lowered. No evidence of bleed     She stated that she has removed urinary stent on Saturday without any issue. She started to have fever and nausea associate with left flank pain on  morning. Otherwise, she has not taken her home medications since her renal stone was diagnosed         Interval Hospital Course:        ROS:  ++ current fever/chills, + headache, no dizziness, no facial pain, no sinus congestion,   No swallowing pain, No chest pain, no palpitation, no shortness of breath, + abd pain,+N/V  No diarrhea, + urinary complaint, no leg pain or swelling, +L flank pain      Assessment/Plan:   1. Sepsis POA (fever/leukocytosis/tachycardia/acute cystitis)  2. Recurrent fever  3. Sinus tachycardia due to pain and on-going sepsis, thought that she has PE  4. Acute cystitis with hematuria, not associate with urinary stent since she does not have urinary stent in her at this time. 5.   Left hydronephrosis and hydroureter on CT, no obstructing stone   6. Recent left ureteroscopy with laser lithotripsy 21, ureteral stent placed, she removed her stent on 21.   7.   Leukocytosis   8. Normocytic anemia   9. Hypovolemic hyponatremia   10. Morbid obesity   11. Hepatomegaly with known fatty liver     Will give IV fluid, and pain control with dilaudid.  Encourage ICS   Continue with cefepime and levofloxacin, urine culture follow up, blood culture follow up  Urology consult to follow for further care  Cont oxybutynin  Daily lab. Add bioK, pepcid. Check iron profile, stool sample   Hold off on CTA chest, low suspicion for PE at this time, can scan her leg first     ADDENDUM: she has fever recurrent. ID consulted. Check biofire to rule out covid-19 infection    Full code     Additional Notes:    Time spent >35 minutes    Case discussed with:  [x]Patient  []Family  [x]Nursing  [x]Case Management  DVT Prophylaxis:  [x]Lovenox  []Hep SQ  []SCDs  []Coumadin   []On Heparin gtt    Signed By: Cathy Antonio MD     2021 11:09 AM              Objective:   VS:   Visit Vitals  /81   Pulse (!) 136   Temp 99.4 °F (37.4 °C)   Resp 15   Ht 5' 3\" (1.6 m)   Wt 115.5 kg (254 lb 10.1 oz)   SpO2 94%   BMI 45.11 kg/m²      Tmax/24hrs: Temp (24hrs), Av.7 °F (37.6 °C), Min:98.1 °F (36.7 °C), Max:102.7 °F (39.3 °C)      Intake/Output Summary (Last 24 hours) at 2021 1109  Last data filed at 2021 1035  Gross per 24 hour   Intake 2010 ml   Output 2000 ml   Net 10 ml     Tele: sinus tachy  General:  Cooperative, Not in acute distress, speaks in full sentence while in bed  HEENT: PERRL, EOMI, supple neck, no JVD, dry oral mucosa  Cardiovascular: S1S2 tachycardia, no rub/gallop   Pulmonary: air entry bilaterally, no wheezing, no crackle  GI:  Soft, non tender, non distended, +bs, no guarding   Left flank pain  Extremities:  No pedal edema, +distal pulses appreciated   Neuro: AOx3, moving all extremities, no gross deficit.      Additional:       Current Facility-Administered Medications   Medication Dose Route Frequency    lactated Ringers infusion  125 mL/hr IntraVENous CONTINUOUS    HYDROmorphone (DILAUDID) injection 2 mg  2 mg IntraVENous Q4H PRN    L. acidophilus,casei,rhamnosus (BIO-K PLUS) capsule 1 Capsule  1 Capsule Oral DAILY    famotidine (PEPCID) tablet 20 mg  20 mg Oral DAILY    sodium chloride (NS) flush 5-10 mL  5-10 mL IntraVENous PRN    levoFLOXacin (LEVAQUIN) 750 mg in D5W IVPB 750 mg IntraVENous Q24H    oxybutynin chloride XL (DITROPAN XL) tablet 10 mg  10 mg Oral DAILY    sodium chloride (NS) flush 5-40 mL  5-40 mL IntraVENous Q8H    sodium chloride (NS) flush 5-40 mL  5-40 mL IntraVENous PRN    acetaminophen (TYLENOL) tablet 650 mg  650 mg Oral Q6H PRN    Or    acetaminophen (TYLENOL) suppository 650 mg  650 mg Rectal Q6H PRN    polyethylene glycol (MIRALAX) packet 17 g  17 g Oral DAILY PRN    ondansetron (ZOFRAN ODT) tablet 4 mg  4 mg Oral Q8H PRN    Or    ondansetron (ZOFRAN) injection 4 mg  4 mg IntraVENous Q6H PRN    enoxaparin (LOVENOX) injection 40 mg  40 mg SubCUTAneous Q24H            Lab/Data Review:  Labs: Results:       Chemistry Recent Labs     06/21/21  0350 06/20/21  1720   * 115*    134*   K 3.7 4.1    99*   CO2 27 28   BUN 4* 5*   CREA 0.55* 0.64   BUCR 7* 8*   AGAP 5 7   CA 8.4* 9.3     Recent Labs     06/20/21  1720   ALT 35   TP 8.5*   ALB 4.0   GLOB 4.5*   AGRAT 0.9      CBC w/Diff Recent Labs     06/21/21  0350 06/20/21  1720   WBC 14.3* 15.4*   RBC 3.80* 4.61   HGB 10.9* 13.2   HCT 32.1* 38.8   MCV 84.5 84.2   MCH 28.7 28.6   MCHC 34.0 34.0   RDW 13.1 12.9    399   GRANS 74* 79*   LYMPH 16* 14*   EOS 0 0      Coagulation No results for input(s): PTP, INR, APTT, INREXT in the last 72 hours.     Iron/Ferritin No results found for: IRON, FE, TIBC, IBCT, PSAT, FERR    BNP    Cardiac Enzymes Lab Results   Component Value Date/Time    CK 90 07/06/2017 02:55 AM    CK - MB <1.0 07/06/2017 02:55 AM    CK-MB Index  07/06/2017 02:55 AM     CALCULATION NOT PERFORMED WHEN RESULT IS BELOW LINEAR LIMIT    Troponin-I, QT <0.02 10/27/2019 09:54 AM        Lactic Acid    Thyroid Studies          All Micro Results     Procedure Component Value Units Date/Time    CULTURE, BLOOD [[id number]] Collected: 06/20/21 1715    Order Status: Completed Specimen: Blood Updated: 06/21/21 0656     Special Requests: NO SPECIAL REQUESTS        Culture result: NO GROWTH AFTER 13 HOURS       CULTURE, BLOOD [[id number]] Collected: 06/20/21 1720    Order Status: Completed Specimen: Blood Updated: 06/21/21 0656     Special Requests: NO SPECIAL REQUESTS        Culture result: NO GROWTH AFTER 13 HOURS       CULTURE, URINE [[id number]] Collected: 06/20/21 1804    Order Status: Completed Specimen: Urine from Clean catch Updated: 06/20/21 1816            Images:    CT (Most Recent). CT Results (most recent):  Results from Hospital Encounter encounter on 06/20/21    CT ABD PELV W CONT    Narrative  EXAM: CT ABDOMEN AND PELVIS WITH CONTRAST    CLINICAL INDICATION/HISTORY: recent stent placement for kidney stones, stent  removed yesterday, fever and flank pain. Left flank pain. COMPARISON: None    TECHNIQUE:  CT abdomen and pelvis with 100 cc of Isovue IV contrast.  All CT scans at this facility are performed using dose optimization technique as  appropriate to the performed examination, to include automated exposure control,  adjustment of the mA and/or kV according to patient's size (including  appropriate matching for site-specific examinations), or use of an iterative  reconstruction technique. FINDINGS:    Lower chest: Unremarkable. Liver: Diffuse low-attenuation of the liver likely reflects steatosis. Liver is  enlarged, measuring 22 cm. Biliary: Unremarkable. Pancreas: Unremarkable. Spleen: Mild splenomegaly, measuring 13 cm. Adrenal glands: Unremarkable. Kidneys: Kidneys enhance symmetrically. Trace left hydronephrosis and  hydroureter. There is mild thickening and enhancement of the left ureteral wall,  with mild surrounding stranding. No renal or ureteral stones are seen. Reproductive organs: Unremarkable. Bladder: Unremarkable. Bowel: Colonic diverticulosis. Normal appendix. No evidence for bowel  obstruction or inflammation. Lymph nodes: No pathologically enlarged lymph nodes. Vasculature: Unremarkable.     Other: No free fluid or free air.    Body wall: Unremarkable. Bones: No acute osseous abnormality. Impression  1. Trace left hydronephrosis and hydroureter, without obstructing stone. There  is ureteral wall thickening and enhancement, which may be reactive from the  recent reported stent placement/removal versus infection.  -No definite evidence for pyelonephritis    2. Hepatic steatosis and hepatomegaly. 3. Mild splenomegaly. 4. Colonic diverticulosis. XRAY (Most Recent)      EKG No results found for this or any previous visit.      2D ECHO

## 2021-06-21 NOTE — PROGRESS NOTES
Advance Care Planning     General Advance Care Planning (ACP) Conversation      Date of Conversation: 6/21/21  Conducted with: Patient with Decision Making Capacity        Content/Action Overview:   DECLINED ACP conversation - will revisit periodically     Silvia Reynolds RN BSN  Care Manager  [phone]

## 2021-06-22 LAB
ANION GAP SERPL CALC-SCNC: 5 MMOL/L (ref 3–18)
BACTERIA SPEC CULT: NORMAL
BUN SERPL-MCNC: 6 MG/DL (ref 7–18)
BUN/CREAT SERPL: 11 (ref 12–20)
CALCIUM SERPL-MCNC: 8.6 MG/DL (ref 8.5–10.1)
CHLORIDE SERPL-SCNC: 100 MMOL/L (ref 100–111)
CO2 SERPL-SCNC: 28 MMOL/L (ref 21–32)
CREAT SERPL-MCNC: 0.55 MG/DL (ref 0.6–1.3)
DATE LAST DOSE: ABNORMAL
GLUCOSE SERPL-MCNC: 122 MG/DL (ref 74–99)
POTASSIUM SERPL-SCNC: 3.7 MMOL/L (ref 3.5–5.5)
REPORTED DOSE,DOSE: ABNORMAL UNITS
REPORTED DOSE/TIME,TMG: 900
SERVICE CMNT-IMP: NORMAL
SODIUM SERPL-SCNC: 133 MMOL/L (ref 136–145)
VANCOMYCIN TROUGH SERPL-MCNC: 8.2 UG/ML (ref 10–20)

## 2021-06-22 PROCEDURE — 74011000250 HC RX REV CODE- 250: Performed by: INTERNAL MEDICINE

## 2021-06-22 PROCEDURE — 99232 SBSQ HOSP IP/OBS MODERATE 35: CPT | Performed by: INTERNAL MEDICINE

## 2021-06-22 PROCEDURE — 74011250637 HC RX REV CODE- 250/637: Performed by: HOSPITALIST

## 2021-06-22 PROCEDURE — 80202 ASSAY OF VANCOMYCIN: CPT

## 2021-06-22 PROCEDURE — 74011250637 HC RX REV CODE- 250/637: Performed by: NURSE PRACTITIONER

## 2021-06-22 PROCEDURE — 74011250636 HC RX REV CODE- 250/636: Performed by: INTERNAL MEDICINE

## 2021-06-22 PROCEDURE — 74011250636 HC RX REV CODE- 250/636: Performed by: HOSPITALIST

## 2021-06-22 PROCEDURE — 80048 BASIC METABOLIC PNL TOTAL CA: CPT

## 2021-06-22 PROCEDURE — 36415 COLL VENOUS BLD VENIPUNCTURE: CPT

## 2021-06-22 PROCEDURE — 65270000029 HC RM PRIVATE

## 2021-06-22 PROCEDURE — 74011250637 HC RX REV CODE- 250/637: Performed by: INTERNAL MEDICINE

## 2021-06-22 RX ORDER — VANCOMYCIN HYDROCHLORIDE
1250 EVERY 6 HOURS
Status: DISCONTINUED | OUTPATIENT
Start: 2021-06-23 | End: 2021-06-24

## 2021-06-22 RX ORDER — BUTALBITAL, ACETAMINOPHEN AND CAFFEINE 300; 40; 50 MG/1; MG/1; MG/1
2 CAPSULE ORAL ONCE
Status: COMPLETED | OUTPATIENT
Start: 2021-06-22 | End: 2021-06-22

## 2021-06-22 RX ORDER — LORATADINE 10 MG/1
10 TABLET ORAL DAILY
Status: DISCONTINUED | OUTPATIENT
Start: 2021-06-22 | End: 2021-06-27 | Stop reason: HOSPADM

## 2021-06-22 RX ORDER — LANOLIN ALCOHOL/MO/W.PET/CERES
2 CREAM (GRAM) TOPICAL EVERY OTHER DAY
Status: DISCONTINUED | OUTPATIENT
Start: 2021-06-22 | End: 2021-06-27 | Stop reason: HOSPADM

## 2021-06-22 RX ORDER — FLUTICASONE PROPIONATE 50 MCG
2 SPRAY, SUSPENSION (ML) NASAL DAILY
Status: DISCONTINUED | OUTPATIENT
Start: 2021-06-22 | End: 2021-06-27 | Stop reason: HOSPADM

## 2021-06-22 RX ADMIN — HYDROMORPHONE HYDROCHLORIDE 2 MG: 2 INJECTION, SOLUTION INTRAMUSCULAR; INTRAVENOUS; SUBCUTANEOUS at 12:41

## 2021-06-22 RX ADMIN — VANCOMYCIN HYDROCHLORIDE 1250 MG: 10 INJECTION, POWDER, LYOPHILIZED, FOR SOLUTION INTRAVENOUS at 18:54

## 2021-06-22 RX ADMIN — Medication 10 ML: at 22:14

## 2021-06-22 RX ADMIN — BUTALBITA,ACETAMINOPHEN AND CAFFEINE 2 CAPSULE: 50; 300; 40 CAPSULE ORAL at 12:37

## 2021-06-22 RX ADMIN — ACETAMINOPHEN 650 MG: 325 TABLET ORAL at 00:50

## 2021-06-22 RX ADMIN — CEFEPIME HYDROCHLORIDE 2 G: 2 INJECTION, POWDER, FOR SOLUTION INTRAVENOUS at 00:49

## 2021-06-22 RX ADMIN — OXYBUTYNIN CHLORIDE 10 MG: 5 TABLET, EXTENDED RELEASE ORAL at 08:37

## 2021-06-22 RX ADMIN — FLUTICASONE PROPIONATE 2 SPRAY: 50 SPRAY, METERED NASAL at 18:57

## 2021-06-22 RX ADMIN — CEFEPIME HYDROCHLORIDE 2 G: 2 INJECTION, POWDER, FOR SOLUTION INTRAVENOUS at 08:39

## 2021-06-22 RX ADMIN — ACETAMINOPHEN 650 MG: 325 TABLET ORAL at 19:56

## 2021-06-22 RX ADMIN — FAMOTIDINE 20 MG: 20 TABLET, FILM COATED ORAL at 08:36

## 2021-06-22 RX ADMIN — ONDANSETRON 4 MG: 4 TABLET, ORALLY DISINTEGRATING ORAL at 08:30

## 2021-06-22 RX ADMIN — Medication 10 ML: at 14:00

## 2021-06-22 RX ADMIN — HYDROMORPHONE HYDROCHLORIDE 2 MG: 2 INJECTION, SOLUTION INTRAMUSCULAR; INTRAVENOUS; SUBCUTANEOUS at 18:51

## 2021-06-22 RX ADMIN — PHENAZOPYRIDINE HYDROCHLORIDE 200 MG: 200 TABLET ORAL at 12:37

## 2021-06-22 RX ADMIN — Medication 1 CAPSULE: at 08:37

## 2021-06-22 RX ADMIN — LORATADINE 10 MG: 10 TABLET ORAL at 12:37

## 2021-06-22 RX ADMIN — HYDROMORPHONE HYDROCHLORIDE 2 MG: 2 INJECTION, SOLUTION INTRAMUSCULAR; INTRAVENOUS; SUBCUTANEOUS at 08:32

## 2021-06-22 RX ADMIN — PHENAZOPYRIDINE HYDROCHLORIDE 200 MG: 200 TABLET ORAL at 18:42

## 2021-06-22 RX ADMIN — HYDROMORPHONE HYDROCHLORIDE 2 MG: 2 INJECTION, SOLUTION INTRAMUSCULAR; INTRAVENOUS; SUBCUTANEOUS at 23:25

## 2021-06-22 RX ADMIN — PHENAZOPYRIDINE HYDROCHLORIDE 200 MG: 200 TABLET ORAL at 08:36

## 2021-06-22 RX ADMIN — POLYETHYLENE GLYCOL 3350 17 G: 17 POWDER, FOR SOLUTION ORAL at 12:39

## 2021-06-22 RX ADMIN — Medication 10 ML: at 05:56

## 2021-06-22 RX ADMIN — VANCOMYCIN HYDROCHLORIDE 1250 MG: 10 INJECTION, POWDER, LYOPHILIZED, FOR SOLUTION INTRAVENOUS at 08:45

## 2021-06-22 RX ADMIN — SODIUM CHLORIDE, SODIUM LACTATE, POTASSIUM CHLORIDE, AND CALCIUM CHLORIDE 125 ML/HR: 600; 310; 30; 20 INJECTION, SOLUTION INTRAVENOUS at 18:44

## 2021-06-22 RX ADMIN — HYDROMORPHONE HYDROCHLORIDE 2 MG: 2 INJECTION, SOLUTION INTRAMUSCULAR; INTRAVENOUS; SUBCUTANEOUS at 02:48

## 2021-06-22 RX ADMIN — ENOXAPARIN SODIUM 40 MG: 40 INJECTION SUBCUTANEOUS at 22:14

## 2021-06-22 RX ADMIN — SODIUM CHLORIDE, SODIUM LACTATE, POTASSIUM CHLORIDE, AND CALCIUM CHLORIDE 125 ML/HR: 600; 310; 30; 20 INJECTION, SOLUTION INTRAVENOUS at 08:28

## 2021-06-22 RX ADMIN — VANCOMYCIN HYDROCHLORIDE 1250 MG: 10 INJECTION, POWDER, LYOPHILIZED, FOR SOLUTION INTRAVENOUS at 00:20

## 2021-06-22 RX ADMIN — CEFEPIME HYDROCHLORIDE 2 G: 2 INJECTION, POWDER, FOR SOLUTION INTRAVENOUS at 18:45

## 2021-06-22 RX ADMIN — FERROUS SULFATE TAB 325 MG (65 MG ELEMENTAL FE) 650 MG: 325 (65 FE) TAB at 12:37

## 2021-06-22 NOTE — ROUTINE PROCESS
Bedside and Verbal shift change report given to Oly Borja RN, BSN (oncoming nurse) by Nixon Crespo RN (offgoing nurse). Report included the following information SBAR, Kardex, ED Summary, Intake/Output, MAR, Recent Results and Cardiac Rhythm Sinus Tach.     Oly Borja RN, BSN

## 2021-06-22 NOTE — PROGRESS NOTES
Problem: Pain  Goal: *Control of Pain  Outcome: Progressing Towards Goal     Problem: Falls - Risk of  Goal: *Absence of Falls  Description: Document Mark Fall Risk and appropriate interventions in the flowsheet.   Outcome: Progressing Towards Goal  Note: Fall Risk Interventions:            Medication Interventions: Patient to call before getting OOB, Teach patient to arise slowly    Elimination Interventions: Call light in reach, Patient to call for help with toileting needs, Stay With Me (per policy)              Problem: Urinary Tract Infection - Adult  Goal: *Absence of infection signs and symptoms  Outcome: Progressing Towards Goal     Problem: Patient Education: Go to Patient Education Activity  Goal: Patient/Family Education  Outcome: Progressing Towards Goal

## 2021-06-22 NOTE — ROUTINE PROCESS
Bedside and Verbal shift change report given to Malinda Ellis, [address] [place] [address] (oncoming nurse) by Leticia Alcaraz, RN, BSN (offgoing nurse). Report included the following information SBAR, Kardex, ED Summary, Intake/Output, MAR, Recent Results and Cardiac Rhythm Sinus Tach.      Leticia Alcaraz, RN, BSN

## 2021-06-22 NOTE — PROGRESS NOTES
Bedside and Verbal shift change report given to Karina Gonzalez RN (oncoming nurse) by Donette Sever, RN (offgoing nurse). Report included the following information SBAR, Kardex, Intake/Output, MAR, Recent Results and Cardiac Rhythm sinus tach . 0830: Pt c/o 8/10 pain to left flank, head and c/o nausea, no vomiting. Administered 2 mg dilaudid and 4 mg zofran. Pt currently on 3L NC O2SAT 95%. Will continue to monitor. 1930: Bedside and Verbal shift change report given to Donette Sever, RN (oncoming nurse) by Karina Gonzalez RN (offgoing nurse). Report included the following information SBAR, Kardex, MAR, Recent Results and Cardiac Rhythm NSR / sinus tach .

## 2021-06-22 NOTE — PROGRESS NOTES
Hospitalist Progress Note    Patient: Marcello Brought Age: 31 y.o. : 1990 MR#: [id number] SSN: [id number]  Date/Time: 2021 10:23 AM    DOA: 2021  PCP: Ramona Garnica NP    Subjective:     Feels tired overall. Still has fever. Still has left flank pain. Has headache, feels like sinus headache. No vomiting, has nausea. Still tachycardia on telemetry. Lab with improved leukocytosis, Hgb/Hct stable. No evidence of bleed   On IV fluid   Urine Cx without growth  Blood Cx without growth       Interval Hospital Course:        ROS:  ++ current fever/chills, + headache, no dizziness, no facial pain, no sinus congestion,   No swallowing pain, No chest pain, no palpitation, no shortness of breath, + abd pain,+N/V  No diarrhea, + urinary complaint, no leg pain or swelling, +L flank pain      Assessment/Plan:     1. Sepsis POA (fever/leukocytosis/tachycardia/acute cystitis)  2. Recurrent fever  3. Sinus tachycardia due to pain and on-going sepsis, thought that she has PE  4. Acute cystitis with hematuria, not associate with urinary stent since she does not have urinary stent in her at this time. 5.   Left hydronephrosis and hydroureter on CT, no obstructing stone   6. Recent left ureteroscopy with laser lithotripsy 21, ureteral stent placed, she removed her stent on 21.   7.   Leukocytosis   8. Normocytic anemia   9. Hypovolemic hyponatremia   10. Morbid obesity   11. Hepatomegaly with known fatty liver       Cont Cefepime+vancomycin, stop levofloxacin. Spoke with ID about false negative growth on her urine culture   Will likely need repeat imaging if persistent fever or pain. Urology consult appreciated  Cont oxybutynin  Cont IV fluid, and pain control with dilaudid.  Encourage ICS   Add flonase, claritin, fiorcet for headache   Iron supplement   Daily lab  Culture follow up     Full code     Additional Notes:    Time spent >30 minutes    Case discussed with:  [x]Patient []Family  [x]Nursing  [x]Case Management  DVT Prophylaxis:  [x]Lovenox  []Hep SQ  []SCDs  []Coumadin   []On Heparin gtt    Signed By: Parveen Lynch MD     2021 10:23 AM              Objective:   VS:   Visit Vitals  /74   Pulse (!) 136   Temp 100.3 °F (37.9 °C)   Resp 23   Ht 5' 3\" (1.6 m)   Wt 116.6 kg (257 lb)   SpO2 100%   BMI 45.53 kg/m²      Tmax/24hrs: Temp (24hrs), Av.5 °F (38.1 °C), Min:98.8 °F (37.1 °C), Max:103.1 °F (39.5 °C)      Intake/Output Summary (Last 24 hours) at 2021 1023  Last data filed at 2021 0848  Gross per 24 hour   Intake 2380.41 ml   Output 1550 ml   Net 830.41 ml     Tele: sinus tachy  General:  Cooperative, Not in acute distress, speaks in full sentence while in bed  HEENT: PERRL, EOMI, supple neck, no JVD, dry oral mucosa  Cardiovascular: S1S2 tachycardia, no rub/gallop   Pulmonary: air entry bilaterally, no wheezing, no crackle  GI:  Soft, non tender, non distended, +bs, no guarding   Left flank pain  Extremities:  No pedal edema, +distal pulses appreciated   Neuro: AOx3, moving all extremities, no gross deficit.      Additional:       Current Facility-Administered Medications   Medication Dose Route Frequency    Vancomycin Trough Prior to 1700 Dose on   1 Each Other ONCE    ferrous sulfate tablet 650 mg  2 Tablet Oral EVERY OTHER DAY    lactated Ringers infusion  125 mL/hr IntraVENous CONTINUOUS    HYDROmorphone (DILAUDID) injection 2 mg  2 mg IntraVENous Q4H PRN    L. acidophilus,casei,rhamnosus (BIO-K PLUS) capsule 1 Capsule  1 Capsule Oral DAILY    famotidine (PEPCID) tablet 20 mg  20 mg Oral DAILY    phenazopyridine (PYRIDIUM) tablet 200 mg  200 mg Oral TIDPC    cefepime (MAXIPIME) 2 g in sterile water (preservative free) 10 mL IV syringe  2 g IntraVENous Q8H    vancomycin (VANCOCIN) 1250 mg in  ml infusion  1,250 mg IntraVENous Q8H    sodium chloride (NS) flush 5-10 mL  5-10 mL IntraVENous PRN    levoFLOXacin (LEVAQUIN) 750 mg in D5W IVPB  750 mg IntraVENous Q24H    oxybutynin chloride XL (DITROPAN XL) tablet 10 mg  10 mg Oral DAILY    sodium chloride (NS) flush 5-40 mL  5-40 mL IntraVENous Q8H    sodium chloride (NS) flush 5-40 mL  5-40 mL IntraVENous PRN    acetaminophen (TYLENOL) tablet 650 mg  650 mg Oral Q6H PRN    Or    acetaminophen (TYLENOL) suppository 650 mg  650 mg Rectal Q6H PRN    polyethylene glycol (MIRALAX) packet 17 g  17 g Oral DAILY PRN    ondansetron (ZOFRAN ODT) tablet 4 mg  4 mg Oral Q8H PRN    Or    ondansetron (ZOFRAN) injection 4 mg  4 mg IntraVENous Q6H PRN    enoxaparin (LOVENOX) injection 40 mg  40 mg SubCUTAneous Q24H            Lab/Data Review:  Labs: Results:       Chemistry Recent Labs     06/22/21 0434 06/21/21  0350 06/20/21  1720   * 124* 115*   * 136 134*   K 3.7 3.7 4.1    104 99*   CO2 28 27 28   BUN 6* 4* 5*   CREA 0.55* 0.55* 0.64   BUCR 11* 7* 8*   AGAP 5 5 7   CA 8.6 8.4* 9.3     Recent Labs     06/20/21  1720   ALT 35   TP 8.5*   ALB 4.0   GLOB 4.5*   AGRAT 0.9      CBC w/Diff Recent Labs     06/21/21 2016 06/21/21  0350 06/20/21  1720   WBC  --  14.3* 15.4*   RBC  --  3.80* 4.61   HGB 10.4* 10.9* 13.2   HCT 31.5* 32.1* 38.8   MCV  --  84.5 84.2   MCH  --  28.7 28.6   MCHC  --  34.0 34.0   RDW  --  13.1 12.9   PLT  --  342 399   GRANS  --  74* 79*   LYMPH  --  16* 14*   EOS  --  0 0      Coagulation No results for input(s): PTP, INR, APTT, INREXT, INREXT in the last 72 hours.     Iron/Ferritin Lab Results   Component Value Date/Time    Iron 23 (L) 06/21/2021 03:50 AM    TIBC 315 06/21/2021 03:50 AM    Iron % saturation 7 (L) 06/21/2021 03:50 AM    Ferritin 76 06/21/2021 03:50 AM       BNP    Cardiac Enzymes Lab Results   Component Value Date/Time    CK 90 07/06/2017 02:55 AM    CK - MB <1.0 07/06/2017 02:55 AM    CK-MB Index  07/06/2017 02:55 AM     CALCULATION NOT PERFORMED WHEN RESULT IS BELOW LINEAR LIMIT    Troponin-I, QT <0.02 10/27/2019 09:54 AM        Lactic Acid Thyroid Studies          All Micro Results     Procedure Component Value Units Date/Time    CULTURE, URINE [[id number]] Collected: 06/20/21 1804    Order Status: Completed Specimen: Urine from Clean catch Updated: 06/22/21 0752     Special Requests: NO SPECIAL REQUESTS        Culture result: No growth (<1,000 CFU/ML)       CULTURE, BLOOD [[id number]] Collected: 06/20/21 1715    Order Status: Completed Specimen: Blood Updated: 06/22/21 0700     Special Requests: NO SPECIAL REQUESTS        Culture result: NO GROWTH 2 DAYS       CULTURE, BLOOD [[id number]] Collected: 06/20/21 1720    Order Status: Completed Specimen: Blood Updated: 06/22/21 0700     Special Requests: NO SPECIAL REQUESTS        Culture result: NO GROWTH 2 DAYS       RESPIRATORY VIRUS PANEL W/COVID-19, PCR [[id number]] Collected: 06/21/21 1706    Order Status: Completed Specimen: Nasopharyngeal Updated: 06/21/21 1929     Adenovirus Not detected        Coronavirus 229E Not detected        Coronavirus HKU1 Not detected        Coronavirus CVNL63 Not detected        Coronavirus OC43 Not detected        SARS-CoV-2, PCR Not detected        Metapneumovirus Not detected        Rhinovirus and Enterovirus Not detected        Influenza A Not detected        Influenza A, subtype H1 Not detected        Influenza A, subtype H3 Not detected        INFLUENZA A H1N1 PCR Not detected        Influenza B Not detected        Parainfluenza 1 Not detected        Parainfluenza 2 Not detected        Parainfluenza 3 Not detected        Parainfluenza virus 4 Not detected        RSV by PCR Not detected        B. parapertussis, PCR Not detected        Bordetella pertussis - PCR Not detected        Chlamydophila pneumoniae DNA, QL, PCR Not detected        Mycoplasma pneumoniae DNA, QL, PCR Not detected               Images:    CT (Most Recent).  CT Results (most recent):  Results from Hospital Encounter encounter on 06/20/21    CT ABD PELV W CONT    Narrative  EXAM: CT ABDOMEN AND PELVIS WITH CONTRAST    CLINICAL INDICATION/HISTORY: recent stent placement for kidney stones, stent  removed yesterday, fever and flank pain. Left flank pain. COMPARISON: None    TECHNIQUE:  CT abdomen and pelvis with 100 cc of Isovue IV contrast.  All CT scans at this facility are performed using dose optimization technique as  appropriate to the performed examination, to include automated exposure control,  adjustment of the mA and/or kV according to patient's size (including  appropriate matching for site-specific examinations), or use of an iterative  reconstruction technique. FINDINGS:    Lower chest: Unremarkable. Liver: Diffuse low-attenuation of the liver likely reflects steatosis. Liver is  enlarged, measuring 22 cm. Biliary: Unremarkable. Pancreas: Unremarkable. Spleen: Mild splenomegaly, measuring 13 cm. Adrenal glands: Unremarkable. Kidneys: Kidneys enhance symmetrically. Trace left hydronephrosis and  hydroureter. There is mild thickening and enhancement of the left ureteral wall,  with mild surrounding stranding. No renal or ureteral stones are seen. Reproductive organs: Unremarkable. Bladder: Unremarkable. Bowel: Colonic diverticulosis. Normal appendix. No evidence for bowel  obstruction or inflammation. Lymph nodes: No pathologically enlarged lymph nodes. Vasculature: Unremarkable. Other: No free fluid or free air. Body wall: Unremarkable. Bones: No acute osseous abnormality. Impression  1. Trace left hydronephrosis and hydroureter, without obstructing stone. There  is ureteral wall thickening and enhancement, which may be reactive from the  recent reported stent placement/removal versus infection.  -No definite evidence for pyelonephritis    2. Hepatic steatosis and hepatomegaly. 3. Mild splenomegaly. 4. Colonic diverticulosis. XRAY (Most Recent)      EKG No results found for this or any previous visit.      2D ECHO

## 2021-06-22 NOTE — PROGRESS NOTES
Infectious Disease progress Note        Reason: Sepsis    Current abx Prior abx   Levofloxacin since 6/20/2021      Lines:       Assessment :    31 y.o. female with past medical history of anxiety, PCOS, gestational diabetes admitted to [facility] on 6/20/2021 with flank pain, fever. S/p cystoscopy, retrograde pyelogram with interpretation and JJ ureteral stent placement on 5/21/21    Urine culture 5/22-mixed culture  Urine culture 5/28/2021-10,000 colonies of more than 2 different organisms. left ureteroscopy with laser lithotripsy on  6/16/2021    CT abd/pelvis 6/20- Trace left hydronephrosis and hydroureter, without obstructing stone      Clinical presentation consistent with sepsis-President admission likely secondary to complicated UTI, left ureteritis    Multiple outpatient antibiotics have likely interfered with the urine culture results. Multiple recent urine culture positive for mixed organisms as noted above. Increased vaginal discharge concerning for vaginitis-  Recommend to rule out GC/chlamydia coinfection. Trichomoniasis- s/p metronidazole 2 g on 6/20/21    Looking at patient's history, she is at high risk for infection with resistant gram-negative/gram-positive pathogens. Hence currently on broad-spectrum antibiotics    Negative COVID-19 test 6/21/2021    Improved fevers. Persistent tachycardia could be secondary to significant left flank pain. Monitor for left renal/perinephric abscess    Recommendations:    1. Continue cefepime, vancomycin. Discontinue levofloxacin  2. Follow-up urology recommendations  3. Will need clinical/radiographic monitoring to rule out evolving left kidney abscess  4. Follow-up urine GC/chlamydia PCR, vaginal swab for GC/chlamydia       Above plan was discussed in details with patient,  and dr Lilli Lynch. Please call me if any further questions or concerns. Will continue to participate in the care of this patient. HPI:    Patient complains of left flank pain. Decreased vaginal discharge. Denies increasing chest pain, shortness of breath. home Medication List    Details   oxybutynin chloride XL (DITROPAN XL) 10 mg CR tablet Take 1 Tablet by mouth daily. Qty: 30 Tablet, Refills: 1      acetaminophen (TYLENOL) 325 mg tablet Take 2 Tabs by mouth every four (4) hours as needed for Pain. Qty: 20 Tab, Refills: 0      prenatal multivit-ca-min-fe-fa (PRENATAL VITAMIN) tab Take 1 Tab by mouth daily.   Qty: 30 Tab, Refills: 0             Current Facility-Administered Medications   Medication Dose Route Frequency    Vancomycin Trough Prior to 1700 Dose on 6/22  1 Each Other ONCE    lactated Ringers infusion  125 mL/hr IntraVENous CONTINUOUS    HYDROmorphone (DILAUDID) injection 2 mg  2 mg IntraVENous Q4H PRN    L. acidophilus,casei,rhamnosus (BIO-K PLUS) capsule 1 Capsule  1 Capsule Oral DAILY    famotidine (PEPCID) tablet 20 mg  20 mg Oral DAILY    phenazopyridine (PYRIDIUM) tablet 200 mg  200 mg Oral TIDPC    cefepime (MAXIPIME) 2 g in sterile water (preservative free) 10 mL IV syringe  2 g IntraVENous Q8H    vancomycin (VANCOCIN) 1250 mg in  ml infusion  1,250 mg IntraVENous Q8H    sodium chloride (NS) flush 5-10 mL  5-10 mL IntraVENous PRN    levoFLOXacin (LEVAQUIN) 750 mg in D5W IVPB  750 mg IntraVENous Q24H    oxybutynin chloride XL (DITROPAN XL) tablet 10 mg  10 mg Oral DAILY    sodium chloride (NS) flush 5-40 mL  5-40 mL IntraVENous Q8H    sodium chloride (NS) flush 5-40 mL  5-40 mL IntraVENous PRN    acetaminophen (TYLENOL) tablet 650 mg  650 mg Oral Q6H PRN    Or    acetaminophen (TYLENOL) suppository 650 mg  650 mg Rectal Q6H PRN    polyethylene glycol (MIRALAX) packet 17 g  17 g Oral DAILY PRN    ondansetron (ZOFRAN ODT) tablet 4 mg  4 mg Oral Q8H PRN    Or    ondansetron (ZOFRAN) injection 4 mg  4 mg IntraVENous Q6H PRN    enoxaparin (LOVENOX) injection 40 mg  40 mg SubCUTAneous Q24H       Allergies: Vicodin [hydrocodone-acetaminophen]    Temp (24hrs), Av.5 °F (38.1 °C), Min:98.8 °F (37.1 °C), Max:103.1 °F (39.5 °C)    Visit Vitals  /74   Pulse (!) 136   Temp 100.3 °F (37.9 °C)   Resp 23   Ht 5' 3\" (1.6 m)   Wt 116.6 kg (257 lb)   SpO2 100%   BMI 45.53 kg/m²       ROS: 12 point ROS obtained in details. Pertinent positives as mentioned in HPI,   otherwise negative    Physical Exam:    Vitals signs and nursing note reviewed. Constitutional:       General: laying on bed, AAOx3, NAD       HENT:      Head: Normocephalic. Eyes:      Conjunctiva/sclera: Conjunctivae normal.      Neck:      Musculoskeletal: Normal range of motion and neck supple. Cardiovascular:      Rate and Rhythm: Normal rate and regular rhythm on monitor  Chest:      Bilateral chest movements equal.    Abdominal:      General: There is no distension. Palpations: Abdomen is soft. Tenderness: There is no abdominal tenderness. There is no rebound. Musculoskeletal: Normal range of motion. Left CVA tenderness  Skin:     General: Skin is warm and dry. Findings: No rash. Neurological:      Mental Status: He is alert and oriented to person, place, and time. Cranial Nerves: No cranial nerve deficit. Motor: No abnormal muscle tone. Coordination: Coordination normal.   Psychiatric:         Behavior: Behavior normal.         Thought Content:  Thought content normal.         Judgment: Judgment normal.   Labs: Results:   Chemistry Recent Labs     21  1720   * 124* 115*   * 136 134*   K 3.7 3.7 4.1    104 99*   CO2 28 27 28   BUN 6* 4* 5*   CREA 0.55* 0.55* 0.64   CA 8.6 8.4* 9.3   AGAP 5 5 7   BUCR 11* 7* 8*   AP  --   --  69   TP  --   --  8.5*   ALB  --   --  4.0   GLOB  --   --  4.5*   AGRAT  --   --  0.9      CBC w/Diff Recent Labs     21  0350 21  1720   WBC  --  14.3* 15.4*   RBC  --  3.80* 4.61   HGB 10.4* 10.9* 13.2   HCT 31.5* 32.1* 38.8   PLT  --  342 399   GRANS  --  74* 79*   LYMPH  --  16* 14*   EOS  --  0 0      Microbiology Recent Labs     06/20/21  1804 06/20/21  1720 06/20/21  1715   CULT No growth (<1,000 CFU/ML) NO GROWTH 2 DAYS NO GROWTH 2 DAYS          RADIOLOGY:    All available imaging studies/reports in [facility] for this admission were reviewed    High complexity decision making was performed during the evaluation of this patient at high risk for decompensation      Disclaimer: Sections of this note are dictated utilizing voice recognition software, which may have resulted in some phonetic based errors in grammar and contents. Even though attempts were made to correct all the mistakes, some may have been missed, and remained in the body of the document. If questions arise, please contact our department.     Dr. Gerardo Coronel, Infectious Disease Specialist  [phone]  June 22, 2021  4:19 PM

## 2021-06-22 NOTE — PROGRESS NOTES
Physician Progress Note      Kym Stubbs  CSN #:                  [id number]  :                       1990  ADMIT DATE:       2021 5:14 PM  [address] [place] DATE:  RESPONDING  PROVIDER #:        LESLY Issa MD          QUERY TEXT:      Dear rounding hospitalist ;    Patient admitted with   sepsis  with  UTI . Documentation reflects  \" likely stent-associated UTI\"   by  ER   MD    If possible, please document in the progress notes and discharge summary if :    The medical record reflects the following:    Risk Factors: - lithotripsy with stent placed on    and removed   by patient on      Clinical Indicators: per  H&P- \" During this procedure, stent with a string was placed which she was instructed to remove yesterday, which she did without issue. Yesterday evening she began to feel sick, staying in bed. This morning she awoke with fevers sweats chills, she took her temperature was 104. She experienced associated nausea, generalized weakness, and left lower back pain. \"    Treatment: - cefepime 2 g IV x1, Levaquin 750 mg IV x1, and 3 L normal saline with improvement of heart rate to 137    Thank you,   Linda Hansen RN   CCDS  x 1973  Options provided:  -- Sepsis/ UTI  due to ureteral stent  confirmed after study  -- Sepsis/ UTI  due to ureteral stent   treated and resolved  -- Sepsis/ UTI  due to ureteral stent   ruled out after study  -- Other - I will add my own diagnosis  -- Disagree - Not applicable / Not valid  -- Disagree - Clinically unable to determine / Unknown  -- Refer to Clinical Documentation Reviewer    PROVIDER RESPONSE TEXT:    This MD does not know how to answer this query. Patient does not have ureteral stent.  Refer to progress note for sepsis diagnosis    Query created by: Beatrice Garcia on 2021 12:44 PM      Electronically signed by:  [facility] MD Vashti Issa MD 2021 8:58 AM

## 2021-06-23 ENCOUNTER — HOSPITAL ENCOUNTER (INPATIENT)
Dept: CT IMAGING | Age: 31
Discharge: HOME | DRG: 724 | End: 2021-06-23
Attending: INTERNAL MEDICINE
Payer: MEDICAID

## 2021-06-23 ENCOUNTER — APPOINTMENT (OUTPATIENT)
Dept: GENERAL RADIOLOGY | Age: 31
DRG: 724 | End: 2021-06-23
Attending: INTERNAL MEDICINE
Payer: MEDICAID

## 2021-06-23 LAB
ANION GAP SERPL CALC-SCNC: 3 MMOL/L (ref 3–18)
BASOPHILS # BLD: 0 K/UL (ref 0–0.1)
BASOPHILS NFR BLD: 0 % (ref 0–2)
BUN SERPL-MCNC: 5 MG/DL (ref 7–18)
BUN/CREAT SERPL: 11 (ref 12–20)
CALCIUM SERPL-MCNC: 8.2 MG/DL (ref 8.5–10.1)
CHLORIDE SERPL-SCNC: 102 MMOL/L (ref 100–111)
CO2 SERPL-SCNC: 31 MMOL/L (ref 21–32)
CREAT SERPL-MCNC: 0.47 MG/DL (ref 0.6–1.3)
DATE LAST DOSE: NORMAL
DIFFERENTIAL METHOD BLD: ABNORMAL
EOSINOPHIL # BLD: 0.1 K/UL (ref 0–0.4)
EOSINOPHIL NFR BLD: 2 % (ref 0–5)
ERYTHROCYTE [DISTWIDTH] IN BLOOD BY AUTOMATED COUNT: 12.9 % (ref 11.6–14.5)
GLUCOSE SERPL-MCNC: 95 MG/DL (ref 74–99)
HCT VFR BLD AUTO: 27.3 % (ref 35–45)
HEMOCCULT STL QL: NEGATIVE
HGB BLD-MCNC: 8.9 G/DL (ref 12–16)
LYMPHOCYTES # BLD: 1.2 K/UL (ref 0.9–3.6)
LYMPHOCYTES NFR BLD: 25 % (ref 21–52)
MCH RBC QN AUTO: 28.1 PG (ref 24–34)
MCHC RBC AUTO-ENTMCNC: 32.6 G/DL (ref 31–37)
MCV RBC AUTO: 86.1 FL (ref 74–97)
MONOCYTES # BLD: 0.4 K/UL (ref 0.05–1.2)
MONOCYTES NFR BLD: 9 % (ref 3–10)
NEUTS SEG # BLD: 3 K/UL (ref 1.8–8)
NEUTS SEG NFR BLD: 64 % (ref 40–73)
PLATELET # BLD AUTO: 192 K/UL (ref 135–420)
PMV BLD AUTO: 9.1 FL (ref 9.2–11.8)
POTASSIUM SERPL-SCNC: 3.8 MMOL/L (ref 3.5–5.5)
RBC # BLD AUTO: 3.17 M/UL (ref 4.2–5.3)
REPORTED DOSE,DOSE: NORMAL UNITS
REPORTED DOSE/TIME,TMG: 1200
SODIUM SERPL-SCNC: 136 MMOL/L (ref 136–145)
VANCOMYCIN TROUGH SERPL-MCNC: 17 UG/ML (ref 10–20)
WBC # BLD AUTO: 4.7 K/UL (ref 4.6–13.2)

## 2021-06-23 PROCEDURE — 74011250636 HC RX REV CODE- 250/636: Performed by: INTERNAL MEDICINE

## 2021-06-23 PROCEDURE — 74011250637 HC RX REV CODE- 250/637: Performed by: NURSE PRACTITIONER

## 2021-06-23 PROCEDURE — 74011250637 HC RX REV CODE- 250/637: Performed by: INTERNAL MEDICINE

## 2021-06-23 PROCEDURE — 71046 X-RAY EXAM CHEST 2 VIEWS: CPT

## 2021-06-23 PROCEDURE — 65270000029 HC RM PRIVATE

## 2021-06-23 PROCEDURE — 99232 SBSQ HOSP IP/OBS MODERATE 35: CPT | Performed by: INTERNAL MEDICINE

## 2021-06-23 PROCEDURE — 74011000250 HC RX REV CODE- 250: Performed by: INTERNAL MEDICINE

## 2021-06-23 PROCEDURE — 74011250636 HC RX REV CODE- 250/636: Performed by: HOSPITALIST

## 2021-06-23 PROCEDURE — 70450 CT HEAD/BRAIN W/O DYE: CPT

## 2021-06-23 PROCEDURE — 74011250637 HC RX REV CODE- 250/637: Performed by: HOSPITALIST

## 2021-06-23 PROCEDURE — 85025 COMPLETE CBC W/AUTO DIFF WBC: CPT

## 2021-06-23 PROCEDURE — 80048 BASIC METABOLIC PNL TOTAL CA: CPT

## 2021-06-23 PROCEDURE — 36415 COLL VENOUS BLD VENIPUNCTURE: CPT

## 2021-06-23 PROCEDURE — 82272 OCCULT BLD FECES 1-3 TESTS: CPT

## 2021-06-23 PROCEDURE — 80202 ASSAY OF VANCOMYCIN: CPT

## 2021-06-23 RX ORDER — FACIAL-BODY WIPES
10 EACH TOPICAL DAILY PRN
Status: DISCONTINUED | OUTPATIENT
Start: 2021-06-23 | End: 2021-06-27 | Stop reason: HOSPADM

## 2021-06-23 RX ORDER — CALCIUM CARBONATE 200(500)MG
200 TABLET,CHEWABLE ORAL DAILY
Status: COMPLETED | OUTPATIENT
Start: 2021-06-23 | End: 2021-06-23

## 2021-06-23 RX ORDER — CEFPODOXIME PROXETIL 200 MG/1
200 TABLET, FILM COATED ORAL EVERY 12 HOURS
Status: DISCONTINUED | OUTPATIENT
Start: 2021-06-23 | End: 2021-06-24

## 2021-06-23 RX ADMIN — VANCOMYCIN HYDROCHLORIDE 1250 MG: 10 INJECTION, POWDER, LYOPHILIZED, FOR SOLUTION INTRAVENOUS at 00:12

## 2021-06-23 RX ADMIN — VANCOMYCIN HYDROCHLORIDE 1250 MG: 10 INJECTION, POWDER, LYOPHILIZED, FOR SOLUTION INTRAVENOUS at 05:58

## 2021-06-23 RX ADMIN — VANCOMYCIN HYDROCHLORIDE 1250 MG: 10 INJECTION, POWDER, LYOPHILIZED, FOR SOLUTION INTRAVENOUS at 23:59

## 2021-06-23 RX ADMIN — PHENAZOPYRIDINE HYDROCHLORIDE 200 MG: 200 TABLET ORAL at 12:56

## 2021-06-23 RX ADMIN — CALCIUM CARBONATE (ANTACID) CHEW TAB 500 MG 200 MG: 500 CHEW TAB at 14:40

## 2021-06-23 RX ADMIN — FAMOTIDINE 20 MG: 20 TABLET, FILM COATED ORAL at 08:17

## 2021-06-23 RX ADMIN — HYDROMORPHONE HYDROCHLORIDE 2 MG: 2 INJECTION, SOLUTION INTRAMUSCULAR; INTRAVENOUS; SUBCUTANEOUS at 10:07

## 2021-06-23 RX ADMIN — CEFEPIME HYDROCHLORIDE 2 G: 2 INJECTION, POWDER, FOR SOLUTION INTRAVENOUS at 08:16

## 2021-06-23 RX ADMIN — SODIUM CHLORIDE, SODIUM LACTATE, POTASSIUM CHLORIDE, AND CALCIUM CHLORIDE 125 ML/HR: 600; 310; 30; 20 INJECTION, SOLUTION INTRAVENOUS at 04:12

## 2021-06-23 RX ADMIN — VANCOMYCIN HYDROCHLORIDE 1250 MG: 10 INJECTION, POWDER, LYOPHILIZED, FOR SOLUTION INTRAVENOUS at 17:58

## 2021-06-23 RX ADMIN — FLUTICASONE PROPIONATE 2 SPRAY: 50 SPRAY, METERED NASAL at 08:17

## 2021-06-23 RX ADMIN — SODIUM CHLORIDE, SODIUM LACTATE, POTASSIUM CHLORIDE, AND CALCIUM CHLORIDE 125 ML/HR: 600; 310; 30; 20 INJECTION, SOLUTION INTRAVENOUS at 23:59

## 2021-06-23 RX ADMIN — Medication 10 ML: at 05:58

## 2021-06-23 RX ADMIN — BISACODYL 10 MG: 10 SUPPOSITORY RECTAL at 09:19

## 2021-06-23 RX ADMIN — Medication 1 CAPSULE: at 08:17

## 2021-06-23 RX ADMIN — VANCOMYCIN HYDROCHLORIDE 1250 MG: 10 INJECTION, POWDER, LYOPHILIZED, FOR SOLUTION INTRAVENOUS at 13:07

## 2021-06-23 RX ADMIN — HYDROMORPHONE HYDROCHLORIDE 2 MG: 2 INJECTION, SOLUTION INTRAMUSCULAR; INTRAVENOUS; SUBCUTANEOUS at 06:06

## 2021-06-23 RX ADMIN — ENOXAPARIN SODIUM 40 MG: 40 INJECTION SUBCUTANEOUS at 21:04

## 2021-06-23 RX ADMIN — LORATADINE 10 MG: 10 TABLET ORAL at 08:17

## 2021-06-23 RX ADMIN — HYDROMORPHONE HYDROCHLORIDE 2 MG: 2 INJECTION, SOLUTION INTRAMUSCULAR; INTRAVENOUS; SUBCUTANEOUS at 21:03

## 2021-06-23 RX ADMIN — ACETAMINOPHEN 650 MG: 325 TABLET ORAL at 21:04

## 2021-06-23 RX ADMIN — Medication 10 ML: at 21:05

## 2021-06-23 RX ADMIN — HYDROMORPHONE HYDROCHLORIDE 2 MG: 2 INJECTION, SOLUTION INTRAMUSCULAR; INTRAVENOUS; SUBCUTANEOUS at 14:40

## 2021-06-23 RX ADMIN — CEFEPIME HYDROCHLORIDE 2 G: 2 INJECTION, POWDER, FOR SOLUTION INTRAVENOUS at 00:53

## 2021-06-23 RX ADMIN — PHENAZOPYRIDINE HYDROCHLORIDE 200 MG: 200 TABLET ORAL at 17:58

## 2021-06-23 RX ADMIN — CEFPODOXIME PROXETIL 200 MG: 200 TABLET, FILM COATED ORAL at 12:56

## 2021-06-23 RX ADMIN — PHENAZOPYRIDINE HYDROCHLORIDE 200 MG: 200 TABLET ORAL at 08:17

## 2021-06-23 RX ADMIN — Medication 10 ML: at 13:28

## 2021-06-23 RX ADMIN — CEFPODOXIME PROXETIL 200 MG: 200 TABLET, FILM COATED ORAL at 21:03

## 2021-06-23 RX ADMIN — OXYBUTYNIN CHLORIDE 10 MG: 5 TABLET, EXTENDED RELEASE ORAL at 08:16

## 2021-06-23 NOTE — PROGRESS NOTES
Problem: Pain  Goal: *Control of Pain  Outcome: Progressing Towards Goal     Problem: Falls - Risk of  Goal: *Absence of Falls  Description: Document Mark Fall Risk and appropriate interventions in the flowsheet.   Outcome: Progressing Towards Goal  Note: Fall Risk Interventions:            Medication Interventions: Teach patient to arise slowly    Elimination Interventions: Call light in reach, Patient to call for help with toileting needs, Stay With Me (per policy)    History of Falls Interventions: Door open when patient unattended         Problem: Urinary Tract Infection - Adult  Goal: *Absence of infection signs and symptoms  Outcome: Progressing Towards Goal     Problem: Patient Education: Go to Patient Education Activity  Goal: Patient/Family Education  Outcome: Progressing Towards Goal

## 2021-06-23 NOTE — ROUTINE PROCESS
Bedside and Verbal shift change report given to Inge Ponce RN (oncoming nurse) by Natalie Monsalve RN, BSN (offgoing nurse). Report included the following information SBAR, Kardex, ED Summary, Intake/Output, MAR, Recent Results and Cardiac Rhythm Sinus Tach.      Natalie Monsalve RN, BSN

## 2021-06-23 NOTE — PROGRESS NOTES
Hospitalist Progress Note    Patient: Staci Pierce Age: 31 y.o. : 1990 MR#: [id number] SSN: [id number]  Date/Time: 2021 11:14 AM    DOA: 2021  PCP: Iglesia Swain NP    Subjective:     Feels better today, but still have fever in th evening   On oxygen but not hypoxia. Able to use her ICS   Has headache and feels her sinus congestive   Only tachycardic on ambulation   Leukocytosis resolved   Hgb/Hct is lowered     Urine Cx without growth  Blood Cx without growth       Interval Hospital Course:        ROS:  ++ current fever/chills, + headache, no dizziness, no facial pain, no sinus congestion,   No swallowing pain, No chest pain, no palpitation, no shortness of breath, + abd pain,no N/V  No diarrhea, + urinary complaint, no leg pain or swelling, +L flank pain      Assessment/Plan:     1. Sepsis POA (fever/leukocytosis/tachycardia/acute cystitis)  2. Recurrent fever  3. Sinus tachycardia due to pain and on-going sepsis, resolving  4. Acute cystitis with hematuria, negative urine culture. 5.   Left hydronephrosis and hydroureter on CT, no obstructing stone   6. Recent left ureteroscopy with laser lithotripsy 21, ureteral stent placed, she removed her stent on 21.   7.   Leukocytosis, resolved    8. Normocytic anemia   9. Hypovolemic hyponatremia   10. Morbid obesity   11. Hepatomegaly with known fatty liver   12. Headache, sinusitis       She continues to have fever, will need to re do imaging of her abd/pelv to reevaluate possible abscess   Spoke with ID about false negative growth on her urine culture   Cont +vancomycin, add vantin, stop cefepime, stop levofloxacin. Urology consult appreciated  Cont oxybutynin  Cont IV fluid, and pain control with dilaudid.  Encourage ICS   cont flonase, claritin, fiorcet for headache   Iron supplement   Daily lab  Culture follow up   CT head to eval headache     Full code     Additional Notes:    Time spent >30 minutes    Case discussed with:  [x]Patient  []Family  [x]Nursing  [x]Case Management  DVT Prophylaxis:  [x]Lovenox  []Hep SQ  []SCDs  []Coumadin   []On Heparin gtt    Signed By: Erin Castellanos MD     2021 11:14 AM              Objective:   VS:   Visit Vitals  /75   Pulse (!) 106   Temp 99.5 °F (37.5 °C)   Resp 20   Ht 5' 3\" (1.6 m)   Wt 118.4 kg (261 lb 0.4 oz)   SpO2 95%   BMI 46.24 kg/m²      Tmax/24hrs: Temp (24hrs), Av.6 °F (37.6 °C), Min:98.6 °F (37 °C), Max:102.3 °F (39.1 °C)      Intake/Output Summary (Last 24 hours) at 2021 1115  Last data filed at 2021 1014  Gross per 24 hour   Intake 4965 ml   Output 3400 ml   Net 1565 ml     Tele: sinus tachy  General:  Cooperative, Not in acute distress, speaks in full sentence while in bed  HEENT: PERRL, EOMI, supple neck, no JVD, dry oral mucosa  Cardiovascular: S1S2 tachycardia, no rub/gallop   Pulmonary: air entry bilaterally, no wheezing, no crackle  GI:  Soft, non tender, non distended, +bs, no guarding   Left flank pain  Extremities:  No pedal edema, +distal pulses appreciated   Neuro: AOx3, moving all extremities, no gross deficit.      Additional:       Current Facility-Administered Medications   Medication Dose Route Frequency    bisacodyL (DULCOLAX) suppository 10 mg  10 mg Rectal DAILY PRN    Vancomycin Trough Prior to 1800 Dose on   1 Each Other ONCE    cefpodoxime (VANTIN) tablet 200 mg  200 mg Oral Q12H    ferrous sulfate tablet 650 mg  2 Tablet Oral EVERY OTHER DAY    fluticasone propionate (FLONASE) 50 mcg/actuation nasal spray 2 Spray  2 Spray Both Nostrils DAILY    loratadine (CLARITIN) tablet 10 mg  10 mg Oral DAILY    vancomycin (VANCOCIN) 1250 mg in  ml infusion  1,250 mg IntraVENous Q6H    lactated Ringers infusion  125 mL/hr IntraVENous CONTINUOUS    HYDROmorphone (DILAUDID) injection 2 mg  2 mg IntraVENous Q4H PRN    L. acidophilus,casei,rhamnosus (BIO-K PLUS) capsule 1 Capsule  1 Capsule Oral DAILY    famotidine (PEPCID) tablet 20 mg  20 mg Oral DAILY    phenazopyridine (PYRIDIUM) tablet 200 mg  200 mg Oral TIDPC    sodium chloride (NS) flush 5-10 mL  5-10 mL IntraVENous PRN    oxybutynin chloride XL (DITROPAN XL) tablet 10 mg  10 mg Oral DAILY    sodium chloride (NS) flush 5-40 mL  5-40 mL IntraVENous Q8H    sodium chloride (NS) flush 5-40 mL  5-40 mL IntraVENous PRN    acetaminophen (TYLENOL) tablet 650 mg  650 mg Oral Q6H PRN    Or    acetaminophen (TYLENOL) suppository 650 mg  650 mg Rectal Q6H PRN    polyethylene glycol (MIRALAX) packet 17 g  17 g Oral DAILY PRN    ondansetron (ZOFRAN ODT) tablet 4 mg  4 mg Oral Q8H PRN    Or    ondansetron (ZOFRAN) injection 4 mg  4 mg IntraVENous Q6H PRN    enoxaparin (LOVENOX) injection 40 mg  40 mg SubCUTAneous Q24H            Lab/Data Review:  Labs: Results:       Chemistry Recent Labs     06/23/21  0557 06/22/21  0434 06/21/21  0350   GLU 95 122* 124*    133* 136   K 3.8 3.7 3.7    100 104   CO2 31 28 27   BUN 5* 6* 4*   CREA 0.47* 0.55* 0.55*   BUCR 11* 11* 7*   AGAP 3 5 5   CA 8.2* 8.6 8.4*     Recent Labs     06/20/21  1720   ALT 35   TP 8.5*   ALB 4.0   GLOB 4.5*   AGRAT 0.9      CBC w/Diff Recent Labs     06/23/21  0557 06/21/21 2016 06/21/21  0350 06/21/21  0350 06/20/21  1720 06/20/21  1720   WBC 4.7  --   --  14.3*  --  15.4*   RBC 3.17*  --   --  3.80*  --  4.61   HGB 8.9* 10.4*  --  10.9*   < > 13.2   HCT 27.3* 31.5*  --  32.1*   < > 38.8   MCV 86.1  --    < > 84.5   < > 84.2   MCH 28.1  --    < > 28.7   < > 28.6   MCHC 32.6  --    < > 34.0   < > 34.0   RDW 12.9  --    < > 13.1   < > 12.9     --   --  342  --  399   GRANS 64  --   --  74*  --  79*   LYMPH 25  --   --  16*  --  14*   EOS 2  --   --  0  --  0    < > = values in this interval not displayed. Coagulation No results for input(s): PTP, INR, APTT, INREXT, INREXT in the last 72 hours.     Iron/Ferritin Lab Results   Component Value Date/Time    Iron 23 (L) 06/21/2021 03:50 AM TIBC 315 06/21/2021 03:50 AM    Iron % saturation 7 (L) 06/21/2021 03:50 AM    Ferritin 76 06/21/2021 03:50 AM       BNP    Cardiac Enzymes Lab Results   Component Value Date/Time    CK 90 07/06/2017 02:55 AM    CK - MB <1.0 07/06/2017 02:55 AM    CK-MB Index  07/06/2017 02:55 AM     CALCULATION NOT PERFORMED WHEN RESULT IS BELOW LINEAR LIMIT    Troponin-I, QT <0.02 10/27/2019 09:54 AM        Lactic Acid    Thyroid Studies          All Micro Results     Procedure Component Value Units Date/Time    CULTURE, BLOOD [[id number]] Collected: 06/20/21 1715    Order Status: Completed Specimen: Blood Updated: 06/23/21 0643     Special Requests: NO SPECIAL REQUESTS        Culture result: NO GROWTH 3 DAYS       CULTURE, BLOOD [[id number]] Collected: 06/20/21 1720    Order Status: Completed Specimen: Blood Updated: 06/23/21 0643     Special Requests: NO SPECIAL REQUESTS        Culture result: NO GROWTH 3 DAYS       CULTURE, URINE [[id number]] Collected: 06/20/21 1804    Order Status: Completed Specimen: Urine from Clean catch Updated: 06/22/21 0752     Special Requests: NO SPECIAL REQUESTS        Culture result: No growth (<1,000 CFU/ML)       RESPIRATORY VIRUS PANEL W/COVID-19, PCR [[id number]] Collected: 06/21/21 1706    Order Status: Completed Specimen: Nasopharyngeal Updated: 06/21/21 1929     Adenovirus Not detected        Coronavirus 229E Not detected        Coronavirus HKU1 Not detected        Coronavirus CVNL63 Not detected        Coronavirus OC43 Not detected        SARS-CoV-2, PCR Not detected        Metapneumovirus Not detected        Rhinovirus and Enterovirus Not detected        Influenza A Not detected        Influenza A, subtype H1 Not detected        Influenza A, subtype H3 Not detected        INFLUENZA A H1N1 PCR Not detected        Influenza B Not detected        Parainfluenza 1 Not detected        Parainfluenza 2 Not detected        Parainfluenza 3 Not detected        Parainfluenza virus 4 Not detected RSV by PCR Not detected        B. parapertussis, PCR Not detected        Bordetella pertussis - PCR Not detected        Chlamydophila pneumoniae DNA, QL, PCR Not detected        Mycoplasma pneumoniae DNA, QL, PCR Not detected               Images:    CT (Most Recent). CT Results (most recent):  Results from Hospital Encounter encounter on 06/20/21    CT ABD PELV W CONT    Narrative  EXAM: CT ABDOMEN AND PELVIS WITH CONTRAST    CLINICAL INDICATION/HISTORY: recent stent placement for kidney stones, stent  removed yesterday, fever and flank pain. Left flank pain. COMPARISON: None    TECHNIQUE:  CT abdomen and pelvis with 100 cc of Isovue IV contrast.  All CT scans at this facility are performed using dose optimization technique as  appropriate to the performed examination, to include automated exposure control,  adjustment of the mA and/or kV according to patient's size (including  appropriate matching for site-specific examinations), or use of an iterative  reconstruction technique. FINDINGS:    Lower chest: Unremarkable. Liver: Diffuse low-attenuation of the liver likely reflects steatosis. Liver is  enlarged, measuring 22 cm. Biliary: Unremarkable. Pancreas: Unremarkable. Spleen: Mild splenomegaly, measuring 13 cm. Adrenal glands: Unremarkable. Kidneys: Kidneys enhance symmetrically. Trace left hydronephrosis and  hydroureter. There is mild thickening and enhancement of the left ureteral wall,  with mild surrounding stranding. No renal or ureteral stones are seen. Reproductive organs: Unremarkable. Bladder: Unremarkable. Bowel: Colonic diverticulosis. Normal appendix. No evidence for bowel  obstruction or inflammation. Lymph nodes: No pathologically enlarged lymph nodes. Vasculature: Unremarkable. Other: No free fluid or free air. Body wall: Unremarkable. Bones: No acute osseous abnormality. Impression  1.  Trace left hydronephrosis and hydroureter, without obstructing stone. There  is ureteral wall thickening and enhancement, which may be reactive from the  recent reported stent placement/removal versus infection.  -No definite evidence for pyelonephritis    2. Hepatic steatosis and hepatomegaly. 3. Mild splenomegaly. 4. Colonic diverticulosis. XRAY (Most Recent)      EKG No results found for this or any previous visit.      2D ECHO

## 2021-06-23 NOTE — PROGRESS NOTES
Chart reviewed. Pt not medically ready for discharge. Pt up and ambulating in the room. Plan remains to d/c home when ready. CM continues to follow along.     Joi Rod RN BSN  Care Manager  [phone]

## 2021-06-23 NOTE — ROUTINE PROCESS
Alert and oriented times four. Verbalizes well. Requests PRN pain medication as needed appropriately. Continues on oxygen at 3 L via nasal cannula. Able to ambulate without assistance with IV to the bathroom. Voiding clear orange urine related to phenazopyridine. No bowel movement as of yet. Patient states that she may need another laxative. Continues on IVABT/ Vancomycin and Cefepime. No noted adverse reactions. Febrile early in the shift. Acetaminophen 650 mg administered orally. Effective in relieving fever. Afebrile throughout the night. Currently lying in low bed with wheels locked, call bell within reach, and side rails times two for safety while watching television.     Matthias Carvajal, RN, BSN

## 2021-06-23 NOTE — PROGRESS NOTES
Infectious Disease progress Note        Reason: Sepsis    Current abx Prior abx     Cefepime, vancomycin since 6/21 Levofloxacin since 6/20/2021-6/22/21     Lines:       Assessment :    31 y.o. female with past medical history of anxiety, PCOS, gestational diabetes admitted to [facility] on 6/20/2021 with flank pain, fever. S/p cystoscopy, retrograde pyelogram with interpretation and JJ ureteral stent placement on 5/21/21    Urine culture 5/22-mixed culture  Urine culture 5/28/2021-10,000 colonies of more than 2 different organisms. left ureteroscopy with laser lithotripsy on  6/16/2021    CT abd/pelvis 6/20- Trace left hydronephrosis and hydroureter, without obstructing stone      Clinical presentation consistent with sepsis-President admission likely secondary to complicated UTI, left ureteritis    Multiple outpatient antibiotics have likely interfered with the urine culture results. Multiple recent urine culture positive for mixed organisms as noted above. Increased vaginal discharge concerning for vaginitis-  Recommend to rule out GC/chlamydia coinfection. Trichomoniasis- s/p metronidazole 2 g on 6/20/21    Looking at patient's history, she is at high risk for infection with resistant gram-negative/gram-positive pathogens. Hence currently on broad-spectrum antibiotics    Negative COVID-19 test 6/21/2021    Improved fevers, tachycardia this AM.  Had T-max of 102 overnight. +nt constipation. Recommendations:    1. Discontinue cefepime, continue vancomycin. Start po cefpodoxime. 2.  Follow-up urology recommendations  3. Will need clinical/radiographic monitoring to rule out evolving left kidney abscess if worsening fevers/tachycardia/leukocytosis/flank pain noted on future exam  4. Follow-up  vaginal swab for GC/chlamydia  5. Agree with suppository for constipation       Above plan was discussed in details with patient,  and dr Jose Perea. Please call me if any further questions or concerns.  Will continue to participate in the care of this patient. HPI:    Patient complains of left flank pain-controlled with pain meds. Decreased vaginal discharge. Denies increasing chest pain, shortness of breath. Feels better. home Medication List    Details   oxybutynin chloride XL (DITROPAN XL) 10 mg CR tablet Take 1 Tablet by mouth daily. Qty: 30 Tablet, Refills: 1      acetaminophen (TYLENOL) 325 mg tablet Take 2 Tabs by mouth every four (4) hours as needed for Pain. Qty: 20 Tab, Refills: 0      prenatal multivit-ca-min-fe-fa (PRENATAL VITAMIN) tab Take 1 Tab by mouth daily.   Qty: 30 Tab, Refills: 0             Current Facility-Administered Medications   Medication Dose Route Frequency    bisacodyL (DULCOLAX) suppository 10 mg  10 mg Rectal DAILY PRN    Vancomycin Trough Prior to 1800 Dose on 6/23  1 Each Other ONCE    ferrous sulfate tablet 650 mg  2 Tablet Oral EVERY OTHER DAY    fluticasone propionate (FLONASE) 50 mcg/actuation nasal spray 2 Spray  2 Spray Both Nostrils DAILY    loratadine (CLARITIN) tablet 10 mg  10 mg Oral DAILY    vancomycin (VANCOCIN) 1250 mg in  ml infusion  1,250 mg IntraVENous Q6H    lactated Ringers infusion  125 mL/hr IntraVENous CONTINUOUS    HYDROmorphone (DILAUDID) injection 2 mg  2 mg IntraVENous Q4H PRN    L. acidophilus,casei,rhamnosus (BIO-K PLUS) capsule 1 Capsule  1 Capsule Oral DAILY    famotidine (PEPCID) tablet 20 mg  20 mg Oral DAILY    phenazopyridine (PYRIDIUM) tablet 200 mg  200 mg Oral TIDPC    cefepime (MAXIPIME) 2 g in sterile water (preservative free) 10 mL IV syringe  2 g IntraVENous Q8H    sodium chloride (NS) flush 5-10 mL  5-10 mL IntraVENous PRN    oxybutynin chloride XL (DITROPAN XL) tablet 10 mg  10 mg Oral DAILY    sodium chloride (NS) flush 5-40 mL  5-40 mL IntraVENous Q8H    sodium chloride (NS) flush 5-40 mL  5-40 mL IntraVENous PRN    acetaminophen (TYLENOL) tablet 650 mg  650 mg Oral Q6H PRN    Or    acetaminophen (TYLENOL) suppository 650 mg  650 mg Rectal Q6H PRN    polyethylene glycol (MIRALAX) packet 17 g  17 g Oral DAILY PRN    ondansetron (ZOFRAN ODT) tablet 4 mg  4 mg Oral Q8H PRN    Or    ondansetron (ZOFRAN) injection 4 mg  4 mg IntraVENous Q6H PRN    enoxaparin (LOVENOX) injection 40 mg  40 mg SubCUTAneous Q24H       Allergies: Vicodin [hydrocodone-acetaminophen]    Temp (24hrs), Av.6 °F (37.6 °C), Min:98.6 °F (37 °C), Max:102.3 °F (39.1 °C)    Visit Vitals  /75   Pulse (!) 106   Temp 99.5 °F (37.5 °C)   Resp 20   Ht 5' 3\" (1.6 m)   Wt 118.4 kg (261 lb 0.4 oz)   SpO2 95%   BMI 46.24 kg/m²       ROS: 12 point ROS obtained in details. Pertinent positives as mentioned in HPI,   otherwise negative    Physical Exam:    Vitals signs and nursing note reviewed. Constitutional:       General: laying on bed, AAOx3, NAD       HENT:      Head: Normocephalic. Eyes:      Conjunctiva/sclera: Conjunctivae normal.      Neck:      Musculoskeletal: Normal range of motion and neck supple. Cardiovascular:      Rate and Rhythm: Normal rate and regular rhythm on monitor  Chest:      Bilateral chest movements equal.    Abdominal:      General: There is no distension. Palpations: Abdomen is soft. Tenderness: There is no abdominal tenderness. There is no rebound. Musculoskeletal: Normal range of motion. Decreased Left CVA tenderness  Skin:     General: Skin is warm and dry. Findings: No rash. Neurological:      Mental Status: He is alert and oriented to person, place, and time. Cranial Nerves: No cranial nerve deficit. Motor: No abnormal muscle tone. Coordination: Coordination normal.   Psychiatric:         Behavior: Behavior normal.         Thought Content:  Thought content normal.         Judgment: Judgment normal.   Labs: Results:   Chemistry Recent Labs     21  0557 21  0434 21  0350 21  1720 21  1720   GLU 95 122* 124*   < > 115*    133* 136   < > 134* K 3.8 3.7 3.7   < > 4.1    100 104   < > 99*   CO2 31 28 27   < > 28   BUN 5* 6* 4*   < > 5*   CREA 0.47* 0.55* 0.55*   < > 0.64   CA 8.2* 8.6 8.4*   < > 9.3   AGAP 3 5 5   < > 7   BUCR 11* 11* 7*   < > 8*   AP  --   --   --   --  69   TP  --   --   --   --  8.5*   ALB  --   --   --   --  4.0   GLOB  --   --   --   --  4.5*   AGRAT  --   --   --   --  0.9    < > = values in this interval not displayed. CBC w/Diff Recent Labs     06/23/21  0557 06/21/21 2016 06/21/21  0350 06/20/21  1720 06/20/21  1720   WBC 4.7  --  14.3*  --  15.4*   RBC 3.17*  --  3.80*  --  4.61   HGB 8.9* 10.4* 10.9*   < > 13.2   HCT 27.3* 31.5* 32.1*   < > 38.8     --  342  --  399   GRANS 64  --  74*  --  79*   LYMPH 25  --  16*  --  14*   EOS 2  --  0  --  0    < > = values in this interval not displayed. Microbiology Recent Labs     06/20/21  1804 06/20/21  1720 06/20/21  1715   CULT No growth (<1,000 CFU/ML) NO GROWTH 3 DAYS NO GROWTH 3 DAYS          RADIOLOGY:    All available imaging studies/reports in [facility] for this admission were reviewed        Disclaimer: Sections of this note are dictated utilizing voice recognition software, which may have resulted in some phonetic based errors in grammar and contents. Even though attempts were made to correct all the mistakes, some may have been missed, and remained in the body of the document. If questions arise, please contact our department.     Dr. Josey Garcia, Infectious Disease Specialist  [phone]  June 23, 2021  4:19 PM

## 2021-06-23 NOTE — PROGRESS NOTES
Problem: Pain  Goal: *Control of Pain  Outcome: Progressing Towards Goal     Problem: Falls - Risk of  Goal: *Absence of Falls  Description: Document Mark Fall Risk and appropriate interventions in the flowsheet.   Outcome: Progressing Towards Goal  Note: Fall Risk Interventions:            Medication Interventions: Patient to call before getting OOB, Teach patient to arise slowly    Elimination Interventions: Call light in reach, Patient to call for help with toileting needs, Stay With Me (per policy)    History of Falls Interventions: Door open when patient unattended         Problem: Urinary Tract Infection - Adult  Goal: *Absence of infection signs and symptoms  Outcome: Progressing Towards Goal     Problem: Patient Education: Go to Patient Education Activity  Goal: Patient/Family Education  Outcome: Progressing Towards Goal

## 2021-06-24 ENCOUNTER — HOSPITAL ENCOUNTER (INPATIENT)
Dept: CT IMAGING | Age: 31
Discharge: HOME | DRG: 724 | End: 2021-06-24
Attending: UROLOGY
Payer: MEDICAID

## 2021-06-24 LAB
ANION GAP SERPL CALC-SCNC: 0 MMOL/L (ref 3–18)
BUN SERPL-MCNC: 4 MG/DL (ref 7–18)
BUN/CREAT SERPL: 9 (ref 12–20)
CALCIUM SERPL-MCNC: 8.6 MG/DL (ref 8.5–10.1)
CHLORIDE SERPL-SCNC: 103 MMOL/L (ref 100–111)
CO2 SERPL-SCNC: 33 MMOL/L (ref 21–32)
CREAT SERPL-MCNC: 0.45 MG/DL (ref 0.6–1.3)
DATE LAST DOSE: NORMAL
ERYTHROCYTE [DISTWIDTH] IN BLOOD BY AUTOMATED COUNT: 12.8 % (ref 11.6–14.5)
GLUCOSE SERPL-MCNC: 109 MG/DL (ref 74–99)
HCT VFR BLD AUTO: 26.7 % (ref 35–45)
HGB BLD-MCNC: 8.7 G/DL (ref 12–16)
MCH RBC QN AUTO: 27.8 PG (ref 24–34)
MCHC RBC AUTO-ENTMCNC: 32.6 G/DL (ref 31–37)
MCV RBC AUTO: 85.3 FL (ref 74–97)
PLATELET # BLD AUTO: 208 K/UL (ref 135–420)
PMV BLD AUTO: 9.1 FL (ref 9.2–11.8)
POTASSIUM SERPL-SCNC: 3.6 MMOL/L (ref 3.5–5.5)
PROCALCITONIN SERPL-MCNC: 0.18 NG/ML
RBC # BLD AUTO: 3.13 M/UL (ref 4.2–5.3)
REPORTED DOSE,DOSE: NORMAL UNITS
REPORTED DOSE/TIME,TMG: 1100
SODIUM SERPL-SCNC: 136 MMOL/L (ref 136–145)
VANCOMYCIN TROUGH SERPL-MCNC: 18.1 UG/ML (ref 10–20)
WBC # BLD AUTO: 5.2 K/UL (ref 4.6–13.2)

## 2021-06-24 PROCEDURE — 84145 PROCALCITONIN (PCT): CPT

## 2021-06-24 PROCEDURE — 71250 CT THORAX DX C-: CPT

## 2021-06-24 PROCEDURE — 74011250636 HC RX REV CODE- 250/636: Performed by: INTERNAL MEDICINE

## 2021-06-24 PROCEDURE — 65270000029 HC RM PRIVATE

## 2021-06-24 PROCEDURE — 85027 COMPLETE CBC AUTOMATED: CPT

## 2021-06-24 PROCEDURE — 80202 ASSAY OF VANCOMYCIN: CPT

## 2021-06-24 PROCEDURE — 74011250637 HC RX REV CODE- 250/637: Performed by: NURSE PRACTITIONER

## 2021-06-24 PROCEDURE — 87086 URINE CULTURE/COLONY COUNT: CPT

## 2021-06-24 PROCEDURE — 74011250637 HC RX REV CODE- 250/637: Performed by: INTERNAL MEDICINE

## 2021-06-24 PROCEDURE — 80048 BASIC METABOLIC PNL TOTAL CA: CPT

## 2021-06-24 PROCEDURE — 74011000258 HC RX REV CODE- 258: Performed by: INTERNAL MEDICINE

## 2021-06-24 PROCEDURE — 87040 BLOOD CULTURE FOR BACTERIA: CPT

## 2021-06-24 PROCEDURE — 99232 SBSQ HOSP IP/OBS MODERATE 35: CPT | Performed by: INTERNAL MEDICINE

## 2021-06-24 PROCEDURE — 99221 1ST HOSP IP/OBS SF/LOW 40: CPT | Performed by: STUDENT IN AN ORGANIZED HEALTH CARE EDUCATION/TRAINING PROGRAM

## 2021-06-24 PROCEDURE — 2709999900 HC NON-CHARGEABLE SUPPLY

## 2021-06-24 PROCEDURE — 36415 COLL VENOUS BLD VENIPUNCTURE: CPT

## 2021-06-24 PROCEDURE — 74011000636 HC RX REV CODE- 636: Performed by: UROLOGY

## 2021-06-24 PROCEDURE — 74011250637 HC RX REV CODE- 250/637: Performed by: HOSPITALIST

## 2021-06-24 PROCEDURE — 74178 CT ABD&PLV WO CNTR FLWD CNTR: CPT

## 2021-06-24 RX ORDER — MONTELUKAST SODIUM 10 MG/1
10 TABLET ORAL DAILY
Status: DISCONTINUED | OUTPATIENT
Start: 2021-06-24 | End: 2021-06-27 | Stop reason: HOSPADM

## 2021-06-24 RX ORDER — CALCIUM CARBONATE 200(500)MG
200 TABLET,CHEWABLE ORAL
Status: DISCONTINUED | OUTPATIENT
Start: 2021-06-24 | End: 2021-06-27 | Stop reason: HOSPADM

## 2021-06-24 RX ORDER — GUAIFENESIN, PSEUDOEPHEDRINE HYDROCHLORIDE 600; 60 MG/1; MG/1
1 TABLET, EXTENDED RELEASE ORAL EVERY 12 HOURS
Status: DISCONTINUED | OUTPATIENT
Start: 2021-06-24 | End: 2021-06-27 | Stop reason: HOSPADM

## 2021-06-24 RX ORDER — VANCOMYCIN/0.9 % SOD CHLORIDE 1.5G/250ML
1500 PLASTIC BAG, INJECTION (ML) INTRAVENOUS EVERY 6 HOURS
Status: DISCONTINUED | OUTPATIENT
Start: 2021-06-24 | End: 2021-06-25

## 2021-06-24 RX ADMIN — FERROUS SULFATE TAB 325 MG (65 MG ELEMENTAL FE) 650 MG: 325 (65 FE) TAB at 10:13

## 2021-06-24 RX ADMIN — HYDROMORPHONE HYDROCHLORIDE 2 MG: 2 INJECTION, SOLUTION INTRAMUSCULAR; INTRAVENOUS; SUBCUTANEOUS at 07:35

## 2021-06-24 RX ADMIN — SODIUM CHLORIDE, SODIUM LACTATE, POTASSIUM CHLORIDE, AND CALCIUM CHLORIDE 125 ML/HR: 600; 310; 30; 20 INJECTION, SOLUTION INTRAVENOUS at 07:35

## 2021-06-24 RX ADMIN — HYDROMORPHONE HYDROCHLORIDE 2 MG: 2 INJECTION, SOLUTION INTRAMUSCULAR; INTRAVENOUS; SUBCUTANEOUS at 11:14

## 2021-06-24 RX ADMIN — PHENAZOPYRIDINE HYDROCHLORIDE 200 MG: 200 TABLET ORAL at 08:18

## 2021-06-24 RX ADMIN — FLUTICASONE PROPIONATE 2 SPRAY: 50 SPRAY, METERED NASAL at 08:22

## 2021-06-24 RX ADMIN — VANCOMYCIN HYDROCHLORIDE 1250 MG: 10 INJECTION, POWDER, LYOPHILIZED, FOR SOLUTION INTRAVENOUS at 11:07

## 2021-06-24 RX ADMIN — GUAIFENESIN AND PSEUDOEPHEDRINE HYDROCHLORIDE 1 TABLET: 600; 60 TABLET, EXTENDED RELEASE ORAL at 11:14

## 2021-06-24 RX ADMIN — PHENAZOPYRIDINE HYDROCHLORIDE 200 MG: 200 TABLET ORAL at 12:09

## 2021-06-24 RX ADMIN — MONTELUKAST 10 MG: 10 TABLET, FILM COATED ORAL at 12:09

## 2021-06-24 RX ADMIN — HYDROMORPHONE HYDROCHLORIDE 2 MG: 2 INJECTION, SOLUTION INTRAMUSCULAR; INTRAVENOUS; SUBCUTANEOUS at 16:47

## 2021-06-24 RX ADMIN — VANCOMYCIN HYDROCHLORIDE 1250 MG: 10 INJECTION, POWDER, LYOPHILIZED, FOR SOLUTION INTRAVENOUS at 06:27

## 2021-06-24 RX ADMIN — OXYBUTYNIN CHLORIDE 10 MG: 5 TABLET, EXTENDED RELEASE ORAL at 08:18

## 2021-06-24 RX ADMIN — IOPAMIDOL 98 ML: 755 INJECTION, SOLUTION INTRAVENOUS at 16:24

## 2021-06-24 RX ADMIN — ACETAMINOPHEN 650 MG: 325 TABLET ORAL at 20:01

## 2021-06-24 RX ADMIN — FAMOTIDINE 20 MG: 20 TABLET, FILM COATED ORAL at 08:18

## 2021-06-24 RX ADMIN — Medication 1 CAPSULE: at 08:18

## 2021-06-24 RX ADMIN — Medication 10 ML: at 13:18

## 2021-06-24 RX ADMIN — SODIUM CHLORIDE, SODIUM LACTATE, POTASSIUM CHLORIDE, AND CALCIUM CHLORIDE 125 ML/HR: 600; 310; 30; 20 INJECTION, SOLUTION INTRAVENOUS at 18:16

## 2021-06-24 RX ADMIN — GUAIFENESIN AND PSEUDOEPHEDRINE HYDROCHLORIDE 1 TABLET: 600; 60 TABLET, EXTENDED RELEASE ORAL at 21:00

## 2021-06-24 RX ADMIN — Medication 10 ML: at 06:27

## 2021-06-24 RX ADMIN — PIPERACILLIN AND TAZOBACTAM 3.38 G: 3; .375 INJECTION, POWDER, LYOPHILIZED, FOR SOLUTION INTRAVENOUS at 21:28

## 2021-06-24 RX ADMIN — LORATADINE 10 MG: 10 TABLET ORAL at 08:18

## 2021-06-24 RX ADMIN — ACETAMINOPHEN 650 MG: 325 TABLET ORAL at 13:50

## 2021-06-24 RX ADMIN — PIPERACILLIN AND TAZOBACTAM 3.38 G: 3; .375 INJECTION, POWDER, LYOPHILIZED, FOR SOLUTION INTRAVENOUS at 10:13

## 2021-06-24 RX ADMIN — PIPERACILLIN AND TAZOBACTAM 3.38 G: 3; .375 INJECTION, POWDER, LYOPHILIZED, FOR SOLUTION INTRAVENOUS at 16:47

## 2021-06-24 RX ADMIN — HYDROMORPHONE HYDROCHLORIDE 2 MG: 2 INJECTION, SOLUTION INTRAMUSCULAR; INTRAVENOUS; SUBCUTANEOUS at 21:28

## 2021-06-24 RX ADMIN — Medication 10 ML: at 22:05

## 2021-06-24 RX ADMIN — VANCOMYCIN HYDROCHLORIDE 1500 MG: 10 INJECTION, POWDER, LYOPHILIZED, FOR SOLUTION INTRAVENOUS at 18:09

## 2021-06-24 RX ADMIN — CALCIUM CARBONATE (ANTACID) CHEW TAB 500 MG 200 MG: 500 CHEW TAB at 16:47

## 2021-06-24 RX ADMIN — CEFPODOXIME PROXETIL 200 MG: 200 TABLET, FILM COATED ORAL at 08:18

## 2021-06-24 RX ADMIN — CALCIUM CARBONATE (ANTACID) CHEW TAB 500 MG 200 MG: 500 CHEW TAB at 12:09

## 2021-06-24 NOTE — PROGRESS NOTES
Bedside report received on pt, she is alert and oriented, c/o HA 4/10; pt also c/o swollen face, on Vancomycin, day RN stated no difference from previous observation, pt able to swallow her saliva upon assessment, RN will monitor and notify MD.    2057: Updates received on CT urogram, ID paged, VM picked up, no message left. Dr. Annabel Talbert notified of urogram result.

## 2021-06-24 NOTE — CONSULTS
Insert 2years old male patient with medical history of gestational diabetes, polycystic ovary disease, mental status G-tube placement for left hydronephrosis was admitted to the hospital on June 20, 2021 after presenting with abdominal pain, fever, chills, and leukocytosis. CT scan of the abdomen showed left hydronephrosis and hydroureter. For sepsis, she was started on cefepime and vancomycin. Subsequently discharged with no discontinued. Blood and urine culture negative. Neurology consulted for headache. She has been having headaches for the past 2 days. Headache is mostly frontal, pressure-like, moderate in severity. When she presses on her forehead, she feels slight tenderness. No skin changes. Associated with mild nausea. Also feels photophobia and phonophobia. No vomiting. No changes in her vision. No new weakness of her extremities. Has mild neck pain but no significant stiffness. She has some worsening of her allergies; cough, congestion, and runny nose. Had a CT scan of the brain which showed no acute changes; there is mild sinus McArdle disease. She had previous similar headaches with her allergies.     Social History     Socioeconomic History    Marital status: SINGLE     Spouse name: Not on file    Number of children: Not on file    Years of education: Not on file    Highest education level: Not on file   Occupational History    Not on file   Tobacco Use    Smoking status: Never Smoker    Smokeless tobacco: Never Used   Substance and Sexual Activity    Alcohol use: Yes     Comment: rarely    Drug use: No    Sexual activity: Yes     Partners: Male     Birth control/protection: Condom   Other Topics Concern    Not on file   Social History Narrative    Not on file     Social Determinants of Health     Financial Resource Strain:     Difficulty of Paying Living Expenses:    Food Insecurity:     Worried About Running Out of Food in the Last Year:     [address] in the Last Year:    Transportation Needs:     Lack of Transportation (Medical):  Lack of Transportation (Non-Medical):    Physical Activity:     Days of Exercise per Week:     Minutes of Exercise per Session:    Stress:     Feeling of Stress :    Social Connections:     Frequency of Communication with Friends and Family:     Frequency of Social Gatherings with Friends and Family:     Attends Muslim Services:     Active Member of Clubs or Organizations:     Attends Club or Organization Meetings:     Marital Status:    Intimate Partner Violence:     Fear of Current or Ex-Partner:     Emotionally Abused:     Physically Abused:     Sexually Abused:        Family History   Problem Relation Age of Onset    Cancer Paternal Grandfather         lung- smoker    Cancer Paternal Grandmother         unknown    Cancer Mother         breast- unknown age        Current Facility-Administered Medications   Medication Dose Route Frequency Provider Last Rate Last Admin    PSEUDOEPHEDRINE-guaiFENesin ([place] [address] D)  mg per tablet 1 Tablet  1 Tablet Oral Q12H Magnolia Love MD   1 Tablet at 06/24/21 1114    piperacillin-tazobactam (ZOSYN) 3.375 g in 0.9% sodium chloride (MBP/ADV) 100 mL MBP  3.375 g IntraVENous Q6H Mani ROBERTSON  mL/hr at 06/24/21 1013 3.375 g at 06/24/21 1013    montelukast (SINGULAIR) tablet 10 mg  10 mg Oral DAILY Hu Yanes MD   10 mg at 06/24/21 1209    calcium carbonate (TUMS) chewable tablet 200 mg [elemental]  200 mg Oral TID WITH MEALS Hu Yanes MD   200 mg at 06/24/21 1209    Vancomycin Trough Prior to 1700 Dose on 6/24 thanks!   1 Each Other ONCE Patrick Steel MD        bisacodyL (DULCOLAX) suppository 10 mg  10 mg Rectal DAILY PRN Hu Yanes MD   10 mg at 06/23/21 0919    ferrous sulfate tablet 650 mg  2 Tablet Oral EVERY OTHER DAY Hu Yanes MD   650 mg at 06/24/21 1013    fluticasone propionate (FLONASE) 50 mcg/actuation nasal spray 2 Spray  2 Spray Both Nostrils DAILY Violette Tomas MD   2 Spray at 06/24/21 3726    loratadine (CLARITIN) tablet 10 mg  10 mg Oral DAILY Violette Tomas MD   10 mg at 06/24/21 0818    vancomycin (VANCOCIN) 1250 mg in  ml infusion  1,250 mg IntraVENous Q6H Ruth ROBERTSON .7 mL/hr at 06/24/21 1107 1,250 mg at 06/24/21 1107    lactated Ringers infusion  125 mL/hr IntraVENous CONTINUOUS Violette Tomas  mL/hr at 06/24/21 0735 125 mL/hr at 06/24/21 0735    HYDROmorphone (DILAUDID) injection 2 mg  2 mg IntraVENous Q4H PRN Violette Tomas MD   2 mg at 06/24/21 1114    L. acidophilus,casei,rhamnosus (BIO-K PLUS) capsule 1 Capsule  1 Capsule Oral DAILY Violette Tomas MD   1 Capsule at 06/24/21 0818    famotidine (PEPCID) tablet 20 mg  20 mg Oral DAILY Violette Tomas MD   20 mg at 06/24/21 0818    sodium chloride (NS) flush 5-10 mL  5-10 mL IntraVENous PRN Jennifer Oakley MD        oxybutynin chloride XL (DITROPAN XL) tablet 10 mg  10 mg Oral DAILY Jennifer Oakley MD   10 mg at 06/24/21 0818    sodium chloride (NS) flush 5-40 mL  5-40 mL IntraVENous Q8H Lorena Russell MD   10 mL at 06/24/21 1318    sodium chloride (NS) flush 5-40 mL  5-40 mL IntraVENous PRN Jennifer Oakley MD        acetaminophen (TYLENOL) tablet 650 mg  650 mg Oral Q6H PRN Jennifer Oakley MD   650 mg at 06/24/21 1350    Or    acetaminophen (TYLENOL) suppository 650 mg  650 mg Rectal Q6H PRN Jeninfer Oakley MD        polyethylene glycol (MIRALAX) packet 17 g  17 g Oral DAILY PRN Jennifer Oakley MD   17 g at 06/22/21 1239    ondansetron (ZOFRAN ODT) tablet 4 mg  4 mg Oral Q8H PRN Jennifer Oakley MD   4 mg at 06/22/21 0830    Or    ondansetron (ZOFRAN) injection 4 mg  4 mg IntraVENous Q6H PRN Jennifer Oakley MD        [Held by provider] enoxaparin (LOVENOX) injection 40 mg  40 mg SubCUTAneous Q24H Jennifer Oakley MD   40 mg at 06/23/21 2104     Facility-Administered Medications Ordered in Other Encounters   Medication Dose Route Frequency Provider Last Rate Last Admin    iopamidoL (ISOVUE-370) 76 % injection 100 mL  100 mL IntraVENous RAD ONCE Harvey Arguelles MD           Past Medical History:   Diagnosis Date    Anxiety     Gestational diabetes     History of PCOS        Past Surgical History:   Procedure Laterality Date    HX  SECTION  , ,     HX UROLOGICAL  2021    CYSTOSCOPY,LEFT URETEROSCOPY, LASER,LEFT STENT EXCHANGE retrograde pyleogram; Dr. Marge Cheadle       Allergies   Allergen Reactions    Vicodin [Hydrocodone-Acetaminophen] Anxiety       Patient Active Problem List   Diagnosis Code    UTI (urinary tract infection) N39.0    Tachycardia R00.0    Fever R50.9         Review of Systems:   Constitutional has intermittent fever  chills  Skin denies rash or itching  HENT  Denies tinnitus, hearing lose  Eyes denies diplopia vision lose  Respiratory denies shortness of breath  Cardiovascular denies chest pain  Gastrointestinal has nausea, but no vomiting. She has abdominal pain. Genitourinary denies incontinence  Musculoskeletal denies joint pain or swelling  Hematology denies easy bruising  Neurological as above in HPI      PHYSICAL EXAMINATION:      VITAL SIGNS:    Visit Vitals  /78 (BP 1 Location: Left upper arm, BP Patient Position: At rest)   Pulse 90   Temp 98.3 °F (36.8 °C)   Resp 13   Ht 5' 3\" (1.6 m)   Wt 117.3 kg (258 lb 8 oz)   SpO2 95%   BMI 45.79 kg/m²       GENERAL: In no apparent distress. Tenderness at the frontal area. EXTREMITIES: Muscle tone is normal.  HEAD:    The patient is normocephalic. NEUROLOGIC EXAMINATION  Mental status: Awake, alert, oriented x3, follows simple, complex and inverted commands, no neglect, no extinction to DSS or VSS. Negative meningeal signs.   Speech and languge: fluent, coherent,  and comprehension intact  CN: VFF, EOMI, PERRLA, face sensation intact , no facial asymmetry noted, palate elevation symmetric bilat, SS+SCM 5/5 bilat, tongue midline  Motor: no pronator drift, tone normal throughout, strength 5/5 throughout  Sensory: intact to light touch throughout  Coordination: FNF, accurate w/o dysmetria  DTR: 2+ throughout  Gait: Normal      Study Result    Narrative & Impression   CT HEAD WO CONT     History: Fever, tachycardia, headache.        Comparison: 7/1/2009     TECHNIQUE: 5 mm helical scan obtained of the head were obtained from the skull  vertex through the base of the skull without intravenous contrast.       All CT scans at this facility are performed using dose optimization technique as  appropriate to a performed exam, to include automated exposure control,  adjustment of the mA and/or kV according to patient size (including appropriate  matching first site-specific examinations), or use of iterative reconstruction  technique.     BRAIN RESULT:       Acute change:   No evidence of an acute infarct or other acute parenchymal  process. Hemorrhage:    No evidence of acute intracranial hemorrhage.     Mass Effect / Mass Lesion:    There is no evidence of an intracranial mass or  extraaxial fluid collection. No significant mass effect. Chronic change:    None apparent.     Parenchyma: There is no significant volume loss. The brain parenchyma is  otherwise within normal limits for age.     Ventricles: The ventricles are within normal limits of size and  configuration for age.     Other:  Possible mucus retention cyst in the left posterior ethmoid sinus. Mild  sinus mucosal thickening. Mastoid air cells are patent. There is a similar  calcification exophytic from the right frontal bone into the extra-axial space  without adjacent edema.   The skull and visualized extracranial soft tissues are  otherwise grossly normal.          IMPRESSION     Mild sinus mucosal disease.               CBC:   Lab Results   Component Value Date/Time    WBC 5.2 06/24/2021 05:39 AM    RBC 3.13 (L) 06/24/2021 05:39 AM    HGB 8.7 (L) 06/24/2021 05:39 AM    HCT 26.7 (L) 06/24/2021 05:39 AM    PLATELET 611 11/24/7255 05:39 AM     BMP:   Lab Results   Component Value Date/Time    Glucose 109 (H) 06/24/2021 05:39 AM    Sodium 136 06/24/2021 05:39 AM    Potassium 3.6 06/24/2021 05:39 AM    Chloride 103 06/24/2021 05:39 AM    CO2 33 (H) 06/24/2021 05:39 AM    BUN 4 (L) 06/24/2021 05:39 AM    Creatinine 0.45 (L) 06/24/2021 05:39 AM    Calcium 8.6 06/24/2021 05:39 AM     CMP:   Lab Results   Component Value Date/Time    Glucose 109 (H) 06/24/2021 05:39 AM    Sodium 136 06/24/2021 05:39 AM    Potassium 3.6 06/24/2021 05:39 AM    Chloride 103 06/24/2021 05:39 AM    CO2 33 (H) 06/24/2021 05:39 AM    BUN 4 (L) 06/24/2021 05:39 AM    Creatinine 0.45 (L) 06/24/2021 05:39 AM    Calcium 8.6 06/24/2021 05:39 AM    Anion gap 0 (L) 06/24/2021 05:39 AM    BUN/Creatinine ratio 9 (L) 06/24/2021 05:39 AM    Alk. phosphatase 69 06/20/2021 05:20 PM    Protein, total 8.5 (H) 06/20/2021 05:20 PM    Albumin 4.0 06/20/2021 05:20 PM    Globulin 4.5 (H) 06/20/2021 05:20 PM    A-G Ratio 0.9 06/20/2021 05:20 PM       Impression/plan:  A 31years old female patient was admitted to the hospital for sepsis possibly from pyelonephritis after presenting with fever, chills, sweating, abdominal pain. Has left hydronephrosis but no nephrolithiasis this time. Over the past couple of days, she has headaches which are mainly frontal.  Also has some worsening of her allergies with nasal congestion and intermittent cough. No changes in her vision. No focal neuro deficits. Previous similar headaches. CT scan of the brain showed mild sinus mucosal disease; otherwise the brain looks normal with no mass lesions. Possible sinusitis versus tension type headache. She is getting as needed Tylenol and will continue with that. If any changes in her mental status or focal deficits, will consider doing additional imaging studies. Call for questions. We will follow patient as needed.       PLEASE NOTE:   This document has been produced using voice recognition software. Unrecognized errors in transcription may be present.

## 2021-06-24 NOTE — PROGRESS NOTES
Problem: Falls - Risk of  Goal: *Absence of Falls  Description: Document Luis Quintanillawaqas Fall Risk and appropriate interventions in the flowsheet.   Outcome: Progressing Towards Goal  Note: Fall Risk Interventions:            Medication Interventions: Teach patient to arise slowly    Elimination Interventions: Call light in reach    History of Falls Interventions: Door open when patient unattended

## 2021-06-24 NOTE — PROGRESS NOTES
Urology Progress Note        Assessment/Plan:     Patient Active Problem List   Diagnosis Code    UTI (urinary tract infection) N39.0    Tachycardia R00.0    Fever R50.9     ASSESSMENT:     UTI/Sepsis   S/p 21 Left ureteroscopy with laser lithotripsy with Dr. Boy Boudreaux   S/p stent self removed    CT :  Trace left hydronephrosis and hydroureter, no obstruction    UA Neg Nitrites, small LE, 1+ bacteria, few trichomonas    Ucx 6/20 NG   Bcx 6/20 NG   WBC normalized 5.2<14.3< 15.4 on admission    Creat normal   Tmax 102.8, persistent     Anxiety  PCOS    PLAN:    New onset of gross hematuria and continued pain    Ordered CT Urogram to r/o abscess/hydro, mass  Fevers continue, Urine and blood cx both negative   WBC have normalized   Patient on Vanc and Zosyn, ID following  Following     Follow up arranged? Pending course       Pool Payne, YOEL Payne, YOEL-BC  [facility]   Pager [phone]      Subjective:     Daily Progress Note: 2021 10:25 AM    High fevers and tachycardia continue   Ucx Neg and Bcx NG, on abx   Patient reports new gross hematuria, not on her period and continues to have discomfort    Objective:     Visit Vitals  /75 (BP 1 Location: Left upper arm, BP Patient Position: At rest)   Pulse 82   Temp 99 °F (37.2 °C)   Resp 11   Ht 5' 3\" (1.6 m)   Wt 117.3 kg (258 lb 8 oz)   SpO2 95%   BMI 45.79 kg/m²        Temp (24hrs), Av.5 °F (37.5 °C), Min:98.2 °F (36.8 °C), Max:102.8 °F (39.3 °C)      Intake and Output:  1901 -  0700  In: 4525.8 [P.O.:800; I.V.:3725.8]  Out: 2800 [Urine:2800]  701 -  1900  In: 2800 [P.O.:400;  I.V.:2400]  Out: 1100 [Urine:1100]    PHYSICAL EXAMINATION:   Visit Vitals  /75 (BP 1 Location: Left upper arm, BP Patient Position: At rest)   Pulse 82   Temp 99 °F (37.2 °C)   Resp 11   Ht 5' 3\" (1.6 m)   Wt 117.3 kg (258 lb 8 oz)   SpO2 95%   BMI 45.79 kg/m²     Constitutional: anxious, tearful   HEENT: Normocephalic, Atraumatic   CV:   no edema   Respiratory: No respiratory distress or difficulties breathing   Abdomen:  Soft and nontender.  Female:  CVA tenderness: left; no suprapubic tenderness   No goins in place   Skin:  Normal color. Neuro/Psych:  Alert and oriented.           CT A/P 6/20     IMPRESSION  1. Trace left hydronephrosis and hydroureter, without obstructing stone. There  is ureteral wall thickening and enhancement, which may be reactive from the  recent reported stent placement/removal versus infection.  -No definite evidence for pyelonephritis     2. Hepatic steatosis and hepatomegaly.     3. Mild splenomegaly.     4. Colonic diverticulosis. Lab/Data Review: All lab results for the last 24 hours reviewed. Labs:     Labs: Results:   Chemistry    Recent Labs     06/24/21  0539 06/23/21  0557 06/22/21  0434   * 95 122*    136 133*   K 3.6 3.8 3.7    102 100   CO2 33* 31 28   BUN 4* 5* 6*   CREA 0.45* 0.47* 0.55*   CA 8.6 8.2* 8.6   AGAP 0* 3 5   BUCR 9* 11* 11*      CBC w/Diff Recent Labs     06/24/21  0539 06/23/21  0557 06/21/21 2016   WBC 5.2 4.7  --    RBC 3.13* 3.17*  --    HGB 8.7* 8.9* 10.4*   HCT 26.7* 27.3* 31.5*    192  --    GRANS  --  64  --    LYMPH  --  25  --    EOS  --  2  --       Cultures No results for input(s): CULT in the last 72 hours.   All Micro Results     Procedure Component Value Units Date/Time    CULTURE, BLOOD [[id number]] Collected: 06/24/21 1018    Order Status: Completed Specimen: Blood Updated: 06/24/21 1114    CULTURE, BLOOD [[id number]] Collected: 06/24/21 1007    Order Status: Completed Specimen: Blood Updated: 06/24/21 1114    CULTURE, URINE [[id number]] Collected: 06/24/21 1000    Order Status: Completed Specimen: Urine from Clean catch Updated: 06/24/21 1049    CULTURE, BLOOD [[id number]] Collected: 06/20/21 1715    Order Status: Completed Specimen: Blood Updated: 06/24/21 0650     Special Requests: NO SPECIAL REQUESTS        Culture result: NO GROWTH 4 DAYS       CULTURE, BLOOD [[id number]] Collected: 06/20/21 1720    Order Status: Completed Specimen: Blood Updated: 06/24/21 0650     Special Requests: NO SPECIAL REQUESTS        Culture result: NO GROWTH 4 DAYS       CULTURE, URINE [[id number]] Collected: 06/20/21 1804    Order Status: Completed Specimen: Urine from Clean catch Updated: 06/22/21 0752     Special Requests: NO SPECIAL REQUESTS        Culture result: No growth (<1,000 CFU/ML)       RESPIRATORY VIRUS PANEL W/COVID-19, PCR [[id number]] Collected: 06/21/21 1706    Order Status: Completed Specimen: Nasopharyngeal Updated: 06/21/21 1929     Adenovirus Not detected        Coronavirus 229E Not detected        Coronavirus HKU1 Not detected        Coronavirus CVNL63 Not detected        Coronavirus OC43 Not detected        SARS-CoV-2, PCR Not detected        Metapneumovirus Not detected        Rhinovirus and Enterovirus Not detected        Influenza A Not detected        Influenza A, subtype H1 Not detected        Influenza A, subtype H3 Not detected        INFLUENZA A H1N1 PCR Not detected        Influenza B Not detected        Parainfluenza 1 Not detected        Parainfluenza 2 Not detected        Parainfluenza 3 Not detected        Parainfluenza virus 4 Not detected        RSV by PCR Not detected        B. parapertussis, PCR Not detected        Bordetella pertussis - PCR Not detected        Chlamydophila pneumoniae DNA, QL, PCR Not detected        Mycoplasma pneumoniae DNA, QL, PCR Not detected               Urinalysis Color   Date Value Ref Range Status   06/20/2021 YELLOW   Final     Appearance   Date Value Ref Range Status   06/20/2021 CLOUDY   Final     Specific gravity   Date Value Ref Range Status   06/20/2021 1.013 1.005 - 1.030   Final     pH (UA)   Date Value Ref Range Status   06/20/2021 >8.5 (H) 5.0 - 8.0 Final     Protein   Date Value Ref Range Status   06/20/2021 Negative NEG mg/dL Final     Ketone   Date Value Ref Range Status 06/20/2021 Negative NEG mg/dL Final     Bilirubin   Date Value Ref Range Status   06/20/2021 Negative NEG   Final     Blood   Date Value Ref Range Status   06/20/2021 TRACE (A) NEG   Final     Urobilinogen   Date Value Ref Range Status   06/20/2021 0.2 0.2 - 1.0 EU/dL Final     Nitrites   Date Value Ref Range Status   06/20/2021 Negative NEG   Final     Leukocyte Esterase   Date Value Ref Range Status   06/20/2021 SMALL (A) NEG   Final     Potassium   Date Value Ref Range Status   06/24/2021 3.6 3.5 - 5.5 mmol/L Final     Creatinine   Date Value Ref Range Status   06/24/2021 0.45 (L) 0.6 - 1.3 MG/DL Final     BUN   Date Value Ref Range Status   06/24/2021 4 (L) 7.0 - 18 MG/DL Final      PSA No results for input(s): PSA in the last 72 hours.    Coagulation Lab Results   Component Value Date/Time    Prothrombin time 13.4 11/10/2019 11:45 AM    INR 1.0 11/10/2019 11:45 AM    aPTT 28.8 11/10/2019 11:45 AM

## 2021-06-24 NOTE — PROGRESS NOTES
Infectious Disease progress Note        Reason: Sepsis    Current abx Prior abx     Cefepime, vancomycin since 6/21 Levofloxacin since 6/20/2021-6/22/21     Lines:       Assessment :    31 y.o. female with past medical history of anxiety, PCOS, gestational diabetes admitted to [facility] on 6/20/2021 with flank pain, fever. S/p cystoscopy, retrograde pyelogram with interpretation and JJ ureteral stent placement on 5/21/21    Urine culture 5/22-mixed culture  Urine culture 5/28/2021-10,000 colonies of more than 2 different organisms. left ureteroscopy with laser lithotripsy on  6/16/2021    CT abd/pelvis 6/20- Trace left hydronephrosis and hydroureter, without obstructing stone      Clinical presentation consistent with sepsis-President admission likely secondary to complicated UTI, left ureteritis    Multiple outpatient antibiotics have likely interfered with the urine culture results. Multiple recent urine culture positive for mixed organisms as noted above. Increased vaginal discharge concerning for vaginitis-  Recommend to rule out GC/chlamydia coinfection. Trichomoniasis- s/p metronidazole 2 g on 6/20/21    Looking at patient's history, she is at high risk for infection with resistant gram-negative/gram-positive pathogens. Hence currently on broad-spectrum antibiotics    Negative COVID-19 test 6/21/2021  Vaginal swab 6/21/2021 - for GC/chlamydia, trichomonas    Now with persistent fevers, left flank pain, increasing sinus congestion. Mild sinus mucosal disease noted on CT scan 6/23/2021. Etiology of persistent fevers not clear. Persistent flank pain concerning for partially treated left kidney infection. Will obtain imaging studies to rule out worsening hydronephrosis/left renal cyst, perinephric abscess. Recommendations:    1. Discontinue Cefpodoxime. Start piperacillin/tazobactam.  Continue vancomycin for now  2. Obtain CT cystogram, CT chest to evaluate for occult infection/inflammation   3. follow-up urology recommendations  4. Start Sudafed/Mucinex. Agree with Flonase  5. Monitor CBC, temperature, clinically to determine further plan of care       Above plan was discussed in details with patient,  and dr Michelle Corral. Please call me if any further questions or concerns. Will continue to participate in the care of this patient. HPI:    Patient is very uncomfortable due to headaches, left leg pain. Was in tears when I evaluated her. She is very anxious that nobody knows what is going on with her and why she continues to have the fever. Complains of sinus congestion      home Medication List    Details   oxybutynin chloride XL (DITROPAN XL) 10 mg CR tablet Take 1 Tablet by mouth daily. Qty: 30 Tablet, Refills: 1      acetaminophen (TYLENOL) 325 mg tablet Take 2 Tabs by mouth every four (4) hours as needed for Pain. Qty: 20 Tab, Refills: 0      prenatal multivit-ca-min-fe-fa (PRENATAL VITAMIN) tab Take 1 Tab by mouth daily.   Qty: 30 Tab, Refills: 0             Current Facility-Administered Medications   Medication Dose Route Frequency    bisacodyL (DULCOLAX) suppository 10 mg  10 mg Rectal DAILY PRN    cefpodoxime (VANTIN) tablet 200 mg  200 mg Oral Q12H    ferrous sulfate tablet 650 mg  2 Tablet Oral EVERY OTHER DAY    fluticasone propionate (FLONASE) 50 mcg/actuation nasal spray 2 Spray  2 Spray Both Nostrils DAILY    loratadine (CLARITIN) tablet 10 mg  10 mg Oral DAILY    vancomycin (VANCOCIN) 1250 mg in  ml infusion  1,250 mg IntraVENous Q6H    lactated Ringers infusion  125 mL/hr IntraVENous CONTINUOUS    HYDROmorphone (DILAUDID) injection 2 mg  2 mg IntraVENous Q4H PRN    L. acidophilus,casei,rhamnosus (BIO-K PLUS) capsule 1 Capsule  1 Capsule Oral DAILY    famotidine (PEPCID) tablet 20 mg  20 mg Oral DAILY    phenazopyridine (PYRIDIUM) tablet 200 mg  200 mg Oral TIDPC    sodium chloride (NS) flush 5-10 mL  5-10 mL IntraVENous PRN    oxybutynin chloride XL (DITROPAN XL) tablet 10 mg  10 mg Oral DAILY    sodium chloride (NS) flush 5-40 mL  5-40 mL IntraVENous Q8H    sodium chloride (NS) flush 5-40 mL  5-40 mL IntraVENous PRN    acetaminophen (TYLENOL) tablet 650 mg  650 mg Oral Q6H PRN    Or    acetaminophen (TYLENOL) suppository 650 mg  650 mg Rectal Q6H PRN    polyethylene glycol (MIRALAX) packet 17 g  17 g Oral DAILY PRN    ondansetron (ZOFRAN ODT) tablet 4 mg  4 mg Oral Q8H PRN    Or    ondansetron (ZOFRAN) injection 4 mg  4 mg IntraVENous Q6H PRN    enoxaparin (LOVENOX) injection 40 mg  40 mg SubCUTAneous Q24H       Allergies: Vicodin [hydrocodone-acetaminophen]    Temp (24hrs), Av.5 °F (37.5 °C), Min:98.2 °F (36.8 °C), Max:102.8 °F (39.3 °C)    Visit Vitals  /66 (BP 1 Location: Left lower arm, BP Patient Position: At rest)   Pulse 89   Temp 98.5 °F (36.9 °C)   Resp 20   Ht 5' 3\" (1.6 m)   Wt 117.3 kg (258 lb 8 oz)   SpO2 95%   BMI 45.79 kg/m²       ROS: 12 point ROS obtained in details. Pertinent positives as mentioned in HPI,   otherwise negative    Physical Exam:    Vitals signs and nursing note reviewed. Constitutional:       General: laying on bed, AAOx3, appears anxious/tearful       HENT:      Head: Normocephalic. +nt tenderness frontal sinus  Eyes:      Conjunctiva/sclera: Conjunctivae normal.      Neck:      Musculoskeletal: Normal range of motion and neck supple. Cardiovascular:      Rate and Rhythm: Normal rate and regular rhythm on monitor  Chest:      Bilateral chest movements equal.    Abdominal:      General: There is no distension. Palpations: Abdomen is soft. Tenderness: There is no abdominal tenderness. There is no rebound. Musculoskeletal: Normal range of motion. Decreased Left CVA tenderness  Skin:     General: Skin is warm and dry. Findings: No rash. Neurological:      Mental Status: He is alert and oriented to person, place, and time. Cranial Nerves: No cranial nerve deficit. Motor: No abnormal muscle tone. Coordination: Coordination normal.   Psychiatric:         Behavior: Behavior normal.         Thought Content: Thought content normal.         Judgment: Judgment normal.   Labs: Results:   Chemistry Recent Labs     06/24/21  0539 06/23/21  0557 06/22/21  0434   * 95 122*    136 133*   K 3.6 3.8 3.7    102 100   CO2 33* 31 28   BUN 4* 5* 6*   CREA 0.45* 0.47* 0.55*   CA 8.6 8.2* 8.6   AGAP 0* 3 5   BUCR 9* 11* 11*      CBC w/Diff Recent Labs     06/24/21  0539 06/23/21  0557 06/21/21 2016   WBC 5.2 4.7  --    RBC 3.13* 3.17*  --    HGB 8.7* 8.9* 10.4*   HCT 26.7* 27.3* 31.5*    192  --    GRANS  --  64  --    LYMPH  --  25  --    EOS  --  2  --       Microbiology No results for input(s): CULT in the last 72 hours. RADIOLOGY:    All available imaging studies/reports in [facility] for this admission were reviewed    High complexity decision making was performed during the evaluation of this patient at high risk for decompensation with multiple organ involvement         Disclaimer: Sections of this note are dictated utilizing voice recognition software, which may have resulted in some phonetic based errors in grammar and contents. Even though attempts were made to correct all the mistakes, some may have been missed, and remained in the body of the document. If questions arise, please contact our department.     Dr. Ha Perez, Infectious Disease Specialist  [phone]  June 24, 2021  4:19 PM

## 2021-06-24 NOTE — PROGRESS NOTES
PT orders received and chart reviewed. PT eval attempted. Pt reporting she is at baseline with no questions or concerns regarding mobility. Pt reports she has been walking in room and toileting independently without AD. No skilled need at this time. Will sign off.      Thank you for this referral.   Jose J Warner PT DPT

## 2021-06-24 NOTE — PROGRESS NOTES
Hospitalist Progress Note    Patient: Alvarado Lockett Age: 31 y.o. : 1990 MR#: [id number] SSN: [id number]  Date/Time: 2021 11:06 AM     DOA: 2021  PCP: Anand Sandhu NP    Subjective:     She feels frustrated that her sinus congestion and headache persists   She fear that she might not get better   She remains with fever in the evening. But leukocytosis resolved   She has no hypoxia but feels her chest is tight   HR has improved   Hgb/Hct is lowered and she tolerate Iron supplement     Urine Cx without growth  Blood Cx without growth   ID recommended zosyn and vancomycin. CT chest, CT cystogram   She thinks she urinated blood but she does not know if she her period       Interval Hospital Course:        ROS:  ++ current fever/chills, + headache, no dizziness, + facial pain, + sinus congestion,   No swallowing pain, No chest pain, no palpitation, no shortness of breath, no abd pain,no N/V  No diarrhea, + urinary complaint, no leg pain or swelling, +L flank pain      Assessment/Plan:     1. Sepsis POA (fever/leukocytosis/tachycardia/acute cystitis)  2. Recurrent fever  3. Sinus tachycardia due to pain and on-going sepsis, resolving  4. Acute cystitis with hematuria, negative urine culture. 5.   Left hydronephrosis and hydroureter on CT, no obstructing stone   6. Recent left ureteroscopy with laser lithotripsy 21, ureteral stent placed, she removed her stent on 21.   7.   Leukocytosis, resolved    8. Normocytic anemia   9. Hypovolemic hyponatremia   10. Morbid obesity   11. Hepatomegaly with known fatty liver   12. Headache, sinusitis     Spoke with ID for antibiotic change. Currently concerning for intraabdominal abscess vs new infectious process in lung. CT cystogram and CT chest today. Repeat her urinalysis today. Urology consult appreciated  Cont oxybutynin  Note that her recent CT head showed sinusitis but no bleed,  Offer Toradol but she declined.  Add singulair with claritin. Continue flonase   Cont IV fluid, and pain control with dilaudid. Encourage ICS   Iron supplement   Daily lab  ICS     Full code   Disposition: pending clinical status     Additional Notes:    Time spent >30 minutes    Case discussed with:  [x]Patient  []Family  [x]Nursing  [x]Case Management  DVT Prophylaxis:  [x]Lovenox  []Hep SQ  []SCDs  []Coumadin   []On Heparin gtt    Signed By: Michelle Massey MD     2021 11:06 AM              Objective:   VS:   Visit Vitals  /75 (BP 1 Location: Left upper arm, BP Patient Position: At rest)   Pulse 82   Temp 99 °F (37.2 °C)   Resp 11   Ht 5' 3\" (1.6 m)   Wt 117.3 kg (258 lb 8 oz)   SpO2 95%   BMI 45.79 kg/m²      Tmax/24hrs: Temp (24hrs), Av.5 °F (37.5 °C), Min:98.2 °F (36.8 °C), Max:102.8 °F (39.3 °C)      Intake/Output Summary (Last 24 hours) at 2021 1107  Last data filed at 2021 1038  Gross per 24 hour   Intake 4640.42 ml   Output 2000 ml   Net 2640.42 ml     Tele: sinus   General:  Cooperative, Not in acute distress, speaks in full sentence while in bed  HEENT: PERRL, EOMI, supple neck, no JVD, dry oral mucosa  Cardiovascular: S1S2 regular, no rub/gallop   Pulmonary: air entry bilaterally, no wheezing, + crackle  GI:  Soft, non tender, non distended, +bs, no guarding   Left lower back tenderness   Extremities:  No pedal edema, +distal pulses appreciated   Neuro: AOx3, moving all extremities, no gross deficit.          Additional:       Current Facility-Administered Medications   Medication Dose Route Frequency    PSEUDOEPHEDRINE-guaiFENesin (MUCINEX D)  mg per tablet 1 Tablet  1 Tablet Oral Q12H    piperacillin-tazobactam (ZOSYN) 3.375 g in 0.9% sodium chloride (MBP/ADV) 100 mL MBP  3.375 g IntraVENous Q6H    bisacodyL (DULCOLAX) suppository 10 mg  10 mg Rectal DAILY PRN    ferrous sulfate tablet 650 mg  2 Tablet Oral EVERY OTHER DAY    fluticasone propionate (FLONASE) 50 mcg/actuation nasal spray 2 Spray  2 Spray Both Nostrils DAILY    loratadine (CLARITIN) tablet 10 mg  10 mg Oral DAILY    vancomycin (VANCOCIN) 1250 mg in  ml infusion  1,250 mg IntraVENous Q6H    lactated Ringers infusion  125 mL/hr IntraVENous CONTINUOUS    HYDROmorphone (DILAUDID) injection 2 mg  2 mg IntraVENous Q4H PRN    L. acidophilus,casei,rhamnosus (BIO-K PLUS) capsule 1 Capsule  1 Capsule Oral DAILY    famotidine (PEPCID) tablet 20 mg  20 mg Oral DAILY    phenazopyridine (PYRIDIUM) tablet 200 mg  200 mg Oral TIDPC    sodium chloride (NS) flush 5-10 mL  5-10 mL IntraVENous PRN    oxybutynin chloride XL (DITROPAN XL) tablet 10 mg  10 mg Oral DAILY    sodium chloride (NS) flush 5-40 mL  5-40 mL IntraVENous Q8H    sodium chloride (NS) flush 5-40 mL  5-40 mL IntraVENous PRN    acetaminophen (TYLENOL) tablet 650 mg  650 mg Oral Q6H PRN    Or    acetaminophen (TYLENOL) suppository 650 mg  650 mg Rectal Q6H PRN    polyethylene glycol (MIRALAX) packet 17 g  17 g Oral DAILY PRN    ondansetron (ZOFRAN ODT) tablet 4 mg  4 mg Oral Q8H PRN    Or    ondansetron (ZOFRAN) injection 4 mg  4 mg IntraVENous Q6H PRN    enoxaparin (LOVENOX) injection 40 mg  40 mg SubCUTAneous Q24H            Lab/Data Review:  Labs: Results:       Chemistry Recent Labs     06/24/21  0539 06/23/21  0557 06/22/21  0434   * 95 122*    136 133*   K 3.6 3.8 3.7    102 100   CO2 33* 31 28   BUN 4* 5* 6*   CREA 0.45* 0.47* 0.55*   BUCR 9* 11* 11*   AGAP 0* 3 5   CA 8.6 8.2* 8.6     No results for input(s): TBIL, ALT, ALKP, TP, ALB, GLOB, AGRAT in the last 72 hours.     No lab exists for component: SGOT   CBC w/Diff Recent Labs     06/24/21  0539 06/23/21  0557 06/23/21  0557 06/21/21 2016   WBC 5.2  --  4.7  --    RBC 3.13*  --  3.17*  --    HGB 8.7*  --  8.9* 10.4*   HCT 26.7*  --  27.3* 31.5*   MCV 85.3   < > 86.1  --    MCH 27.8   < > 28.1  --    MCHC 32.6   < > 32.6  --    RDW 12.8   < > 12.9  --      --  192  --    GRANS  --   --  64  -- LYMPH  --   --  25  --    EOS  --   --  2  --     < > = values in this interval not displayed. Coagulation No results for input(s): PTP, INR, APTT, INREXT, INREXT in the last 72 hours.     Iron/Ferritin Lab Results   Component Value Date/Time    Iron 23 (L) 06/21/2021 03:50 AM    TIBC 315 06/21/2021 03:50 AM    Iron % saturation 7 (L) 06/21/2021 03:50 AM    Ferritin 76 06/21/2021 03:50 AM       BNP    Cardiac Enzymes Lab Results   Component Value Date/Time    CK 90 07/06/2017 02:55 AM    CK - MB <1.0 07/06/2017 02:55 AM    CK-MB Index  07/06/2017 02:55 AM     CALCULATION NOT PERFORMED WHEN RESULT IS BELOW LINEAR LIMIT    Troponin-I, QT <0.02 10/27/2019 09:54 AM        Lactic Acid    Thyroid Studies          All Micro Results     Procedure Component Value Units Date/Time    CULTURE, URINE [[id number]] Collected: 06/24/21 1000    Order Status: Completed Specimen: Urine from Clean catch Updated: 06/24/21 1049    CULTURE, BLOOD [[id number]] Collected: 06/24/21 1018    Order Status: Completed Specimen: Blood Updated: 06/24/21 1048    CULTURE, BLOOD [[id number]] Collected: 06/24/21 1007    Order Status: Completed Specimen: Blood Updated: 06/24/21 1047    CULTURE, BLOOD [[id number]] Collected: 06/20/21 1715    Order Status: Completed Specimen: Blood Updated: 06/24/21 0650     Special Requests: NO SPECIAL REQUESTS        Culture result: NO GROWTH 4 DAYS       CULTURE, BLOOD [[id number]] Collected: 06/20/21 1720    Order Status: Completed Specimen: Blood Updated: 06/24/21 0650     Special Requests: NO SPECIAL REQUESTS        Culture result: NO GROWTH 4 DAYS       CULTURE, URINE [[id number]] Collected: 06/20/21 1804    Order Status: Completed Specimen: Urine from Clean catch Updated: 06/22/21 0752     Special Requests: NO SPECIAL REQUESTS        Culture result: No growth (<1,000 CFU/ML)       RESPIRATORY VIRUS PANEL W/COVID-19, PCR [[id number]] Collected: 06/21/21 1706    Order Status: Completed Specimen: Nasopharyngeal Updated: 06/21/21 1929     Adenovirus Not detected        Coronavirus 229E Not detected        Coronavirus HKU1 Not detected        Coronavirus CVNL63 Not detected        Coronavirus OC43 Not detected        SARS-CoV-2, PCR Not detected        Metapneumovirus Not detected        Rhinovirus and Enterovirus Not detected        Influenza A Not detected        Influenza A, subtype H1 Not detected        Influenza A, subtype H3 Not detected        INFLUENZA A H1N1 PCR Not detected        Influenza B Not detected        Parainfluenza 1 Not detected        Parainfluenza 2 Not detected        Parainfluenza 3 Not detected        Parainfluenza virus 4 Not detected        RSV by PCR Not detected        B. parapertussis, PCR Not detected        Bordetella pertussis - PCR Not detected        Chlamydophila pneumoniae DNA, QL, PCR Not detected        Mycoplasma pneumoniae DNA, QL, PCR Not detected               Images:    CT (Most Recent). CT Results (most recent):  Results from Hospital Encounter encounter on 06/20/21    CT HEAD WO CONT    Narrative  CT HEAD WO CONT    History: Fever, tachycardia, headache. Comparison: 7/1/2009    TECHNIQUE: 5 mm helical scan obtained of the head were obtained from the skull  vertex through the base of the skull without intravenous contrast.    All CT scans at this facility are performed using dose optimization technique as  appropriate to a performed exam, to include automated exposure control,  adjustment of the mA and/or kV according to patient size (including appropriate  matching first site-specific examinations), or use of iterative reconstruction  technique. BRAIN RESULT:    Acute change:   No evidence of an acute infarct or other acute parenchymal  process. Hemorrhage:    No evidence of acute intracranial hemorrhage. Mass Effect / Mass Lesion:    There is no evidence of an intracranial mass or  extraaxial fluid collection. No significant mass effect.     Chronic change: None apparent. Parenchyma: There is no significant volume loss. The brain parenchyma is  otherwise within normal limits for age. Ventricles: The ventricles are within normal limits of size and  configuration for age. Other:  Possible mucus retention cyst in the left posterior ethmoid sinus. Mild  sinus mucosal thickening. Mastoid air cells are patent. There is a similar  calcification exophytic from the right frontal bone into the extra-axial space  without adjacent edema. The skull and visualized extracranial soft tissues are  otherwise grossly normal.    Impression  Mild sinus mucosal disease. XRAY (Most Recent)      EKG No results found for this or any previous visit.      2D ECHO

## 2021-06-24 NOTE — PROGRESS NOTES
OT order received and chart reviewed. Pt currently has active bedrest orders. Please update orders for full pt participation in OT evaluation.     Thank you for this referral.    Era Romero, OTNANCY, OTR/L

## 2021-06-24 NOTE — PROGRESS NOTES
OT order received and chart reviewed. Pt cleared by RN for OT evaluation. OT screen performed, nursing stated pt is ambulatory without assistance. Pt confirmed she has been walking around the room, getting up in the chair, going to the bathroom and showering. 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 OT provided education regarding use of a shower chair at home for energy conservation and safety in shower if dizziness returns. Pt verbalized understanding. No further OT needs at this time as pt is at baseline for participation in self-care skills.     Thank you for this referral.    Jeremiah Bragg, OTNANCY, OTR/L

## 2021-06-25 LAB
ANION GAP SERPL CALC-SCNC: 5 MMOL/L (ref 3–18)
APPEARANCE UR: ABNORMAL
BACTERIA SPEC CULT: NORMAL
BACTERIA URNS QL MICRO: ABNORMAL /HPF
BASOPHILS # BLD: 0 K/UL (ref 0–0.1)
BASOPHILS NFR BLD: 0 % (ref 0–2)
BILIRUB UR QL: ABNORMAL
BUN SERPL-MCNC: 2 MG/DL (ref 7–18)
BUN/CREAT SERPL: 5 (ref 12–20)
C TRACH RRNA SPEC QL NAA+PROBE: NEGATIVE
CALCIUM SERPL-MCNC: 8.2 MG/DL (ref 8.5–10.1)
CHLORIDE SERPL-SCNC: 104 MMOL/L (ref 100–111)
CO2 SERPL-SCNC: 30 MMOL/L (ref 21–32)
COLOR UR: ABNORMAL
CREAT SERPL-MCNC: 0.44 MG/DL (ref 0.6–1.3)
DATE LAST DOSE: ABNORMAL
DATE LAST DOSE: NORMAL
DIFFERENTIAL METHOD BLD: ABNORMAL
EOSINOPHIL # BLD: 0.3 K/UL (ref 0–0.4)
EOSINOPHIL NFR BLD: 4 % (ref 0–5)
EPITH CASTS URNS QL MICRO: ABNORMAL /LPF (ref 0–5)
ERYTHROCYTE [DISTWIDTH] IN BLOOD BY AUTOMATED COUNT: 12.8 % (ref 11.6–14.5)
GLUCOSE SERPL-MCNC: 95 MG/DL (ref 74–99)
GLUCOSE UR STRIP.AUTO-MCNC: NEGATIVE MG/DL
HCT VFR BLD AUTO: 26 % (ref 35–45)
HGB BLD-MCNC: 8.4 G/DL (ref 12–16)
HGB UR QL STRIP: ABNORMAL
KETONES UR QL STRIP.AUTO: NEGATIVE MG/DL
LEUKOCYTE ESTERASE UR QL STRIP.AUTO: ABNORMAL
LYMPHOCYTES # BLD: 2 K/UL (ref 0.9–3.6)
LYMPHOCYTES NFR BLD: 28 % (ref 21–52)
MCH RBC QN AUTO: 27.8 PG (ref 24–34)
MCHC RBC AUTO-ENTMCNC: 32.3 G/DL (ref 31–37)
MCV RBC AUTO: 86.1 FL (ref 74–97)
MONOCYTES # BLD: 0.4 K/UL (ref 0.05–1.2)
MONOCYTES NFR BLD: 6 % (ref 3–10)
N GONORRHOEA RRNA SPEC QL NAA+PROBE: NEGATIVE
NEUTS SEG # BLD: 4.4 K/UL (ref 1.8–8)
NEUTS SEG NFR BLD: 62 % (ref 40–73)
NITRITE UR QL STRIP.AUTO: POSITIVE
PH UR STRIP: 7.5 [PH] (ref 5–8)
PLATELET # BLD AUTO: 247 K/UL (ref 135–420)
PLATELET COMMENTS,PCOM: ABNORMAL
PMV BLD AUTO: 9.2 FL (ref 9.2–11.8)
POTASSIUM SERPL-SCNC: 3.7 MMOL/L (ref 3.5–5.5)
PROT UR STRIP-MCNC: 30 MG/DL
RBC # BLD AUTO: 3.02 M/UL (ref 4.2–5.3)
RBC #/AREA URNS HPF: ABNORMAL /HPF (ref 0–5)
RBC MORPH BLD: ABNORMAL
REPORTED DOSE,DOSE: NORMAL UNITS
REPORTED DOSE/TIME,TMG: 100
REPORTED DOSE/TIME,TMG: 752
SERVICE CMNT-IMP: NORMAL
SODIUM SERPL-SCNC: 139 MMOL/L (ref 136–145)
SP GR UR REFRACTOMETRY: 1.01 (ref 1–1.03)
SPECIMEN SOURCE: ABNORMAL
T VAGINALIS RRNA VAG QL NAA+PROBE: POSITIVE
UROBILINOGEN UR QL STRIP.AUTO: 1 EU/DL (ref 0.2–1)
VANCOMYCIN TROUGH SERPL-MCNC: 16.5 UG/ML (ref 10–20)
VANCOMYCIN TROUGH SERPL-MCNC: 27.3 UG/ML (ref 10–20)
WBC # BLD AUTO: 7.1 K/UL (ref 4.6–13.2)
WBC MORPH BLD: ABNORMAL
WBC URNS QL MICRO: ABNORMAL /HPF (ref 0–4)

## 2021-06-25 PROCEDURE — 81001 URINALYSIS AUTO W/SCOPE: CPT

## 2021-06-25 PROCEDURE — 74011250637 HC RX REV CODE- 250/637: Performed by: INTERNAL MEDICINE

## 2021-06-25 PROCEDURE — 65270000029 HC RM PRIVATE

## 2021-06-25 PROCEDURE — 80048 BASIC METABOLIC PNL TOTAL CA: CPT

## 2021-06-25 PROCEDURE — 99232 SBSQ HOSP IP/OBS MODERATE 35: CPT | Performed by: INTERNAL MEDICINE

## 2021-06-25 PROCEDURE — 74011250636 HC RX REV CODE- 250/636: Performed by: INTERNAL MEDICINE

## 2021-06-25 PROCEDURE — 74011250637 HC RX REV CODE- 250/637: Performed by: HOSPITALIST

## 2021-06-25 PROCEDURE — 87086 URINE CULTURE/COLONY COUNT: CPT

## 2021-06-25 PROCEDURE — 2709999900 HC NON-CHARGEABLE SUPPLY

## 2021-06-25 PROCEDURE — 74011000258 HC RX REV CODE- 258: Performed by: INTERNAL MEDICINE

## 2021-06-25 PROCEDURE — 80202 ASSAY OF VANCOMYCIN: CPT

## 2021-06-25 PROCEDURE — 74011000250 HC RX REV CODE- 250: Performed by: INTERNAL MEDICINE

## 2021-06-25 PROCEDURE — 36415 COLL VENOUS BLD VENIPUNCTURE: CPT

## 2021-06-25 PROCEDURE — 85025 COMPLETE CBC W/AUTO DIFF WBC: CPT

## 2021-06-25 RX ORDER — LINEZOLID 600 MG/1
600 TABLET, FILM COATED ORAL 2 TIMES DAILY
Qty: 20 TABLET | Refills: 0 | Status: SHIPPED | OUTPATIENT
Start: 2021-06-25 | End: 2021-09-27

## 2021-06-25 RX ORDER — CEFPODOXIME PROXETIL 200 MG/1
200 TABLET, FILM COATED ORAL 2 TIMES DAILY
Qty: 20 TABLET | Refills: 0 | Status: SHIPPED | OUTPATIENT
Start: 2021-06-25 | End: 2021-06-27

## 2021-06-25 RX ORDER — VANCOMYCIN/0.9 % SOD CHLORIDE 1.5G/250ML
1500 PLASTIC BAG, INJECTION (ML) INTRAVENOUS EVERY 8 HOURS
Status: DISCONTINUED | OUTPATIENT
Start: 2021-06-25 | End: 2021-06-26

## 2021-06-25 RX ORDER — CEFPODOXIME PROXETIL 200 MG/1
200 TABLET, FILM COATED ORAL EVERY 12 HOURS
Status: DISCONTINUED | OUTPATIENT
Start: 2021-06-25 | End: 2021-06-25

## 2021-06-25 RX ADMIN — OXYBUTYNIN CHLORIDE 10 MG: 5 TABLET, EXTENDED RELEASE ORAL at 09:13

## 2021-06-25 RX ADMIN — FAMOTIDINE 20 MG: 20 TABLET, FILM COATED ORAL at 09:13

## 2021-06-25 RX ADMIN — VANCOMYCIN HYDROCHLORIDE 1500 MG: 10 INJECTION, POWDER, LYOPHILIZED, FOR SOLUTION INTRAVENOUS at 02:09

## 2021-06-25 RX ADMIN — Medication 10 ML: at 13:08

## 2021-06-25 RX ADMIN — VANCOMYCIN HYDROCHLORIDE 1500 MG: 10 INJECTION, POWDER, LYOPHILIZED, FOR SOLUTION INTRAVENOUS at 17:57

## 2021-06-25 RX ADMIN — HYDROMORPHONE HYDROCHLORIDE 2 MG: 2 INJECTION, SOLUTION INTRAMUSCULAR; INTRAVENOUS; SUBCUTANEOUS at 02:14

## 2021-06-25 RX ADMIN — MONTELUKAST 10 MG: 10 TABLET, FILM COATED ORAL at 09:13

## 2021-06-25 RX ADMIN — CALCIUM CARBONATE (ANTACID) CHEW TAB 500 MG 200 MG: 500 CHEW TAB at 16:12

## 2021-06-25 RX ADMIN — VANCOMYCIN HYDROCHLORIDE 1500 MG: 10 INJECTION, POWDER, LYOPHILIZED, FOR SOLUTION INTRAVENOUS at 23:37

## 2021-06-25 RX ADMIN — SODIUM CHLORIDE, SODIUM LACTATE, POTASSIUM CHLORIDE, AND CALCIUM CHLORIDE 125 ML/HR: 600; 310; 30; 20 INJECTION, SOLUTION INTRAVENOUS at 02:33

## 2021-06-25 RX ADMIN — SODIUM CHLORIDE, SODIUM LACTATE, POTASSIUM CHLORIDE, AND CALCIUM CHLORIDE 125 ML/HR: 600; 310; 30; 20 INJECTION, SOLUTION INTRAVENOUS at 10:58

## 2021-06-25 RX ADMIN — HYDROMORPHONE HYDROCHLORIDE 2 MG: 2 INJECTION, SOLUTION INTRAMUSCULAR; INTRAVENOUS; SUBCUTANEOUS at 06:19

## 2021-06-25 RX ADMIN — VANCOMYCIN HYDROCHLORIDE 1500 MG: 10 INJECTION, POWDER, LYOPHILIZED, FOR SOLUTION INTRAVENOUS at 07:52

## 2021-06-25 RX ADMIN — Medication 1 CAPSULE: at 09:12

## 2021-06-25 RX ADMIN — PIPERACILLIN AND TAZOBACTAM 3.38 G: 3; .375 INJECTION, POWDER, LYOPHILIZED, FOR SOLUTION INTRAVENOUS at 10:54

## 2021-06-25 RX ADMIN — HYDROMORPHONE HYDROCHLORIDE 2 MG: 2 INJECTION, SOLUTION INTRAMUSCULAR; INTRAVENOUS; SUBCUTANEOUS at 16:02

## 2021-06-25 RX ADMIN — CEFEPIME HYDROCHLORIDE 2 G: 2 INJECTION, POWDER, FOR SOLUTION INTRAVENOUS at 16:02

## 2021-06-25 RX ADMIN — Medication 10 ML: at 22:10

## 2021-06-25 RX ADMIN — CEFEPIME HYDROCHLORIDE 2 G: 2 INJECTION, POWDER, FOR SOLUTION INTRAVENOUS at 23:33

## 2021-06-25 RX ADMIN — HYDROMORPHONE HYDROCHLORIDE 2 MG: 2 INJECTION, SOLUTION INTRAMUSCULAR; INTRAVENOUS; SUBCUTANEOUS at 10:54

## 2021-06-25 RX ADMIN — PIPERACILLIN AND TAZOBACTAM 3.38 G: 3; .375 INJECTION, POWDER, LYOPHILIZED, FOR SOLUTION INTRAVENOUS at 04:05

## 2021-06-25 RX ADMIN — Medication 10 ML: at 06:19

## 2021-06-25 RX ADMIN — FLUTICASONE PROPIONATE 2 SPRAY: 50 SPRAY, METERED NASAL at 09:14

## 2021-06-25 RX ADMIN — HYDROMORPHONE HYDROCHLORIDE 2 MG: 2 INJECTION, SOLUTION INTRAMUSCULAR; INTRAVENOUS; SUBCUTANEOUS at 22:06

## 2021-06-25 RX ADMIN — GUAIFENESIN AND PSEUDOEPHEDRINE HYDROCHLORIDE 1 TABLET: 600; 60 TABLET, EXTENDED RELEASE ORAL at 22:06

## 2021-06-25 RX ADMIN — CALCIUM CARBONATE (ANTACID) CHEW TAB 500 MG 200 MG: 500 CHEW TAB at 09:13

## 2021-06-25 RX ADMIN — GUAIFENESIN AND PSEUDOEPHEDRINE HYDROCHLORIDE 1 TABLET: 600; 60 TABLET, EXTENDED RELEASE ORAL at 09:13

## 2021-06-25 RX ADMIN — LORATADINE 10 MG: 10 TABLET ORAL at 09:13

## 2021-06-25 NOTE — ROUTINE PROCESS
Bedside shift change report given to Riya Tafoya RN (oncoming nurse) by Damir Crowell RN (offgoing nurse). Report included the following information SBAR, Procedure Summary, Intake/Output, MAR and Recent Results.

## 2021-06-25 NOTE — PROGRESS NOTES
Bedside report received on pt, no complaint voiced. 2140: Pt c/o piv site pain, site with minimal redness and swollen post vanco infusion, ice pack applied, piv removed. An attempt made to establish an iv site, unsuccessful. Nabila Hunter RN and Simone Alvarado RN asked for assistance. Pt c/o HA, tylenol offered, pt declined, awaiting piv access for IV dilaudid.

## 2021-06-25 NOTE — PROGRESS NOTES
Infectious Disease progress Note        Reason: Sepsis    Current abx Prior abx     Cefepime, vancomycin since 6/21 Levofloxacin since 6/20/2021-6/22/21     Lines:       Assessment :    31 y.o. female with past medical history of anxiety, PCOS, gestational diabetes admitted to [facility] on 6/20/2021 with flank pain, fever. S/p cystoscopy, retrograde pyelogram with interpretation and JJ ureteral stent placement on 5/21/21    Urine culture 5/22-mixed culture  Urine culture 5/28/2021-10,000 colonies of more than 2 different organisms. left ureteroscopy with laser lithotripsy on  6/16/2021    CT abd/pelvis 6/20- Trace left hydronephrosis and hydroureter, without obstructing stone      Clinical presentation consistent with sepsis - present on admission likely secondary to complicated UTI, left ureteritis    Multiple outpatient antibiotics have likely interfered with the urine culture results. Multiple recent urine culture positive for mixed organisms as noted above. Increased vaginal discharge concerning for vaginitis-  Negative  GC/chlamydia test 6/21  Trichomoniasis- positive pcr t. Vaginalis 6/21- s/p metronidazole 2 g on 6/20/21    Looking at patient's history, she is at high risk for infection with resistant gram-negative/gram-positive pathogens. Hence currently on broad-spectrum antibiotics    Negative COVID-19 test 6/21/2021  Vaginal swab 6/21/2021 - for GC/chlamydia, trichomonas    recent persistent fevers till 6/23/21, left flank pain could be due to resolving pyelonephritis. Resolved fevers in the past 24 hours. No new pathology identified on Ct cystogram.     Now with hematuria since last pm. D/w urologist. Could be due to recent stent removal. I think other possibilities include partially treated UTI due to resistant pathogens/yeast versus non infectious etiologies, pathology. Need clinical/radiographic monitoring as outpatient to rule out any undiagnosed pathology.      Significant headaches since 2 days- doubt sinusitis related since no improvement on broad spectrum abx, nasal decongestants. Only   Mild sinus mucosal disease noted on CT scan 6/23/2021. Recommendations:    1. Discontinue piperacillin/tazobactam. Start cefepime. Continue vancomycin for now- d/w pharmacist- difficulty dosing vancomycin since low troughs despite high dose. 2.  F/u urine cx 6/24, 6/25  3. follow-up urology recommendations  4.  mx of headache per primary team  5. Monitor CBC, temperature, clinically to determine further plan of care       Above plan was discussed in details with patient,  Dr. Gayle Barraza and dr Adalberto Francois. Please call me if any further questions or concerns. Will continue to participate in the care of this patient. HPI:    Patient is very uncomfortable due to headaches, left leg pain. Was in tears when I evaluated her. She is very anxious that nobody knows what is going on with her and why she continues to have the fever. Complains of sinus congestion      home Medication List    Details   oxybutynin chloride XL (DITROPAN XL) 10 mg CR tablet Take 1 Tablet by mouth daily. Qty: 30 Tablet, Refills: 1      acetaminophen (TYLENOL) 325 mg tablet Take 2 Tabs by mouth every four (4) hours as needed for Pain. Qty: 20 Tab, Refills: 0      prenatal multivit-ca-min-fe-fa (PRENATAL VITAMIN) tab Take 1 Tab by mouth daily.   Qty: 30 Tab, Refills: 0             Current Facility-Administered Medications   Medication Dose Route Frequency    Vancomycin Lab Information  1 Each Other Once per day on Fri    PSEUDOEPHEDRINE-guaiFENesin (MUCINEX D)  mg per tablet 1 Tablet  1 Tablet Oral Q12H    piperacillin-tazobactam (ZOSYN) 3.375 g in 0.9% sodium chloride (MBP/ADV) 100 mL MBP  3.375 g IntraVENous Q6H    montelukast (SINGULAIR) tablet 10 mg  10 mg Oral DAILY    calcium carbonate (TUMS) chewable tablet 200 mg [elemental]  200 mg Oral TID WITH MEALS    vancomycin (VANCOCIN) 1500 mg in  ml infusion  1,500 mg IntraVENous Q6H    bisacodyL (DULCOLAX) suppository 10 mg  10 mg Rectal DAILY PRN    ferrous sulfate tablet 650 mg  2 Tablet Oral EVERY OTHER DAY    fluticasone propionate (FLONASE) 50 mcg/actuation nasal spray 2 Spray  2 Spray Both Nostrils DAILY    loratadine (CLARITIN) tablet 10 mg  10 mg Oral DAILY    lactated Ringers infusion  125 mL/hr IntraVENous CONTINUOUS    HYDROmorphone (DILAUDID) injection 2 mg  2 mg IntraVENous Q4H PRN    L. acidophilus,casei,rhamnosus (BIO-K PLUS) capsule 1 Capsule  1 Capsule Oral DAILY    famotidine (PEPCID) tablet 20 mg  20 mg Oral DAILY    sodium chloride (NS) flush 5-10 mL  5-10 mL IntraVENous PRN    oxybutynin chloride XL (DITROPAN XL) tablet 10 mg  10 mg Oral DAILY    sodium chloride (NS) flush 5-40 mL  5-40 mL IntraVENous Q8H    sodium chloride (NS) flush 5-40 mL  5-40 mL IntraVENous PRN    acetaminophen (TYLENOL) tablet 650 mg  650 mg Oral Q6H PRN    Or    acetaminophen (TYLENOL) suppository 650 mg  650 mg Rectal Q6H PRN    polyethylene glycol (MIRALAX) packet 17 g  17 g Oral DAILY PRN    ondansetron (ZOFRAN ODT) tablet 4 mg  4 mg Oral Q8H PRN    Or    ondansetron (ZOFRAN) injection 4 mg  4 mg IntraVENous Q6H PRN    [Held by provider] enoxaparin (LOVENOX) injection 40 mg  40 mg SubCUTAneous Q24H       Allergies: Vicodin [hydrocodone-acetaminophen]    Temp (24hrs), Av.6 °F (37 °C), Min:98.3 °F (36.8 °C), Max:99 °F (37.2 °C)    Visit Vitals  /76   Pulse (!) 105   Temp 98.3 °F (36.8 °C)   Resp 19   Ht 5' 3\" (1.6 m)   Wt 117.3 kg (258 lb 11.2 oz)   SpO2 98%   BMI 45.83 kg/m²       ROS: 12 point ROS obtained in details. Pertinent positives as mentioned in HPI,   otherwise negative    Physical Exam:    Vitals signs and nursing note reviewed. Constitutional:       General: laying on bed, AAOx3, appears anxious/tearful       HENT:      Head: Normocephalic.  +nt tenderness frontal sinus  Eyes:      Conjunctiva/sclera: Conjunctivae normal.      Neck: Musculoskeletal: Normal range of motion and neck supple. Cardiovascular:      Rate and Rhythm: Normal rate and regular rhythm on monitor  Chest:      Bilateral chest movements equal.    Abdominal:      General: There is no distension. Palpations: Abdomen is soft. Tenderness: There is no abdominal tenderness. There is no rebound. Musculoskeletal: Normal range of motion. Decreased Left CVA tenderness  Skin:     General: Skin is warm and dry. Findings: No rash. Neurological:      Mental Status: He is alert and oriented to person, place, and time. Cranial Nerves: No cranial nerve deficit. Motor: No abnormal muscle tone. Coordination: Coordination normal.   Psychiatric:         Behavior: Behavior normal.         Thought Content: Thought content normal.         Judgment: Judgment normal.   Labs: Results:   Chemistry Recent Labs     06/25/21  0615 06/24/21  0539 06/23/21  0557   GLU 95 109* 95    136 136   K 3.7 3.6 3.8    103 102   CO2 30 33* 31   BUN 2* 4* 5*   CREA 0.44* 0.45* 0.47*   CA 8.2* 8.6 8.2*   AGAP 5 0* 3   BUCR 5* 9* 11*      CBC w/Diff Recent Labs     06/25/21  0615 06/24/21  0539 06/23/21  0557   WBC 7.1 5.2 4.7   RBC 3.02* 3.13* 3.17*   HGB 8.4* 8.7* 8.9*   HCT 26.0* 26.7* 27.3*    208 192   GRANS 62  --  64   LYMPH 28  --  25   EOS 4  --  2      Microbiology Recent Labs     06/24/21  1018 06/24/21  1007   CULT NO GROWTH AFTER 19 HOURS NO GROWTH AFTER 19 HOURS          RADIOLOGY:    All available imaging studies/reports in [facility] for this admission were reviewed    High complexity decision making was performed during the evaluation of this patient          Disclaimer: Sections of this note are dictated utilizing voice recognition software, which may have resulted in some phonetic based errors in grammar and contents.  Even though attempts were made to correct all the mistakes, some may have been missed, and remained in the body of the document. If questions arise, please contact our department.     Dr. Giorgi Knott, Infectious Disease Specialist  [phone]  June 25, 2021  4:19 PM

## 2021-06-25 NOTE — ROUTINE PROCESS
Bedside shift change report given to Perla Sutton (oncoming nurse) by Leighton Clarke RN (offgoing nurse). Report included the following information SBAR, Kardex, Intake/Output, MAR, Recent Results and Cardiac Rhythm NSR.

## 2021-06-25 NOTE — PROGRESS NOTES
Hospitalist Progress Note    Patient: Annamaria Durand Age: 31 y.o. : 1990 MR#: [id number] SSN: [id number]  Date/Time: 2021 11:50 AM     DOA: 2021  PCP: Jaylon Goldberg NP    Subjective:     No further fever, no leukocytosis. Tachycardia resolved, Still on IV antibiotics  Has urinary output with blood. CTU noted severe pyelonephritis. No abscess. Headache persists but congestion improved. Interval Hospital Course:        ROS:  NO current fever/chills, + headache, no dizziness, + facial pain, + sinus congestion,   No swallowing pain, No chest pain, no palpitation, no shortness of breath, no abd pain,no N/V  No diarrhea, + urinary complaint, no leg pain or swelling, +L flank pain    Assessment/Plan:     1. Sepsis POA (fever/leukocytosis/tachycardia/acute cystitis)  2. Recurrent fever  3. Sinus tachycardia due to pain and on-going sepsis, resolving  4. Acute cystitis with hematuria, negative urine culture. 5.   Left hydronephrosis and hydroureter on CT, no obstructing stone   6. Recent left ureteroscopy with laser lithotripsy 21, ureteral stent placed, she removed her stent on 21.   7.   Leukocytosis, resolved    8. Normocytic anemia   9. Hypovolemic hyponatremia   10. Morbid obesity   11. Hepatomegaly with known fatty liver   12. Headache, sinusitis, tension headache, stress, negative for CNS bleed     Cont vancomycin, ID changed back to cefepime. Urine culture repeat today. , follow up for discharge   Urology ok for outpatient follow up, no further intervention as there is no abscess   Cont oxybutynin  Note that her recent CT head showed sinusitis but no bleed,  Offer Toradol but she declined. continue singulair with claritin. Continue flonase   Can trial steroid or topomax if this is migraine   D/c IV fluid, and pain control with dilaudid.  Encourage ICS   Iron supplement   Daily lab  ICS     Full code   Disposition: pending clinical status     Additional Notes: Time spent >30 minutes    Case discussed with:  [x]Patient  []Family  [x]Nursing  [x]Case Management  DVT Prophylaxis:  [x]Lovenox  []Hep SQ  []SCDs  []Coumadin   []On Heparin gtt    Signed By: Marlena Clancy MD     2021 11:50 AM              Objective:   VS:   Visit Vitals  /73   Pulse 90   Temp 98.4 °F (36.9 °C)   Resp 18   Ht 5' 3\" (1.6 m)   Wt 117.3 kg (258 lb 11.2 oz)   SpO2 96%   BMI 45.83 kg/m²      Tmax/24hrs: Temp (24hrs), Av.5 °F (36.9 °C), Min:98.3 °F (36.8 °C), Max:98.8 °F (37.1 °C)      Intake/Output Summary (Last 24 hours) at 2021 1151  Last data filed at 2021 1142  Gross per 24 hour   Intake 2200 ml   Output 3850 ml   Net -1650 ml     Tele: sinus   General:  Cooperative, Not in acute distress, speaks in full sentence while in bed  HEENT: PERRL, EOMI, supple neck, no JVD, dry oral mucosa  Cardiovascular: S1S2 regular, no rub/gallop   Pulmonary: air entry bilaterally, no wheezing, + crackle  GI:  Soft, non tender, non distended, +bs, no guarding   Left lower back tenderness   Extremities:  No pedal edema, +distal pulses appreciated   Neuro: AOx3, moving all extremities, no gross deficit.          Additional:       Current Facility-Administered Medications   Medication Dose Route Frequency    Vancomycin Lab Information  1 Each Other Once per day on Fri    PSEUDOEPHEDRINE-guaiFENesin (MUCINEX D)  mg per tablet 1 Tablet  1 Tablet Oral Q12H    piperacillin-tazobactam (ZOSYN) 3.375 g in 0.9% sodium chloride (MBP/ADV) 100 mL MBP  3.375 g IntraVENous Q6H    montelukast (SINGULAIR) tablet 10 mg  10 mg Oral DAILY    calcium carbonate (TUMS) chewable tablet 200 mg [elemental]  200 mg Oral TID WITH MEALS    [Held by provider] vancomycin (VANCOCIN) 1500 mg in  ml infusion  1,500 mg IntraVENous Q6H    bisacodyL (DULCOLAX) suppository 10 mg  10 mg Rectal DAILY PRN    ferrous sulfate tablet 650 mg  2 Tablet Oral EVERY OTHER DAY    fluticasone propionate (FLONASE) 50 mcg/actuation nasal spray 2 Spray  2 Spray Both Nostrils DAILY    loratadine (CLARITIN) tablet 10 mg  10 mg Oral DAILY    lactated Ringers infusion  125 mL/hr IntraVENous CONTINUOUS    HYDROmorphone (DILAUDID) injection 2 mg  2 mg IntraVENous Q4H PRN    L. acidophilus,casei,rhamnosus (BIO-K PLUS) capsule 1 Capsule  1 Capsule Oral DAILY    famotidine (PEPCID) tablet 20 mg  20 mg Oral DAILY    sodium chloride (NS) flush 5-10 mL  5-10 mL IntraVENous PRN    oxybutynin chloride XL (DITROPAN XL) tablet 10 mg  10 mg Oral DAILY    sodium chloride (NS) flush 5-40 mL  5-40 mL IntraVENous Q8H    sodium chloride (NS) flush 5-40 mL  5-40 mL IntraVENous PRN    acetaminophen (TYLENOL) tablet 650 mg  650 mg Oral Q6H PRN    Or    acetaminophen (TYLENOL) suppository 650 mg  650 mg Rectal Q6H PRN    polyethylene glycol (MIRALAX) packet 17 g  17 g Oral DAILY PRN    ondansetron (ZOFRAN ODT) tablet 4 mg  4 mg Oral Q8H PRN    Or    ondansetron (ZOFRAN) injection 4 mg  4 mg IntraVENous Q6H PRN    [Held by provider] enoxaparin (LOVENOX) injection 40 mg  40 mg SubCUTAneous Q24H            Lab/Data Review:  Labs: Results:       Chemistry Recent Labs     06/25/21 0615 06/24/21  0539 06/23/21  0557   GLU 95 109* 95    136 136   K 3.7 3.6 3.8    103 102   CO2 30 33* 31   BUN 2* 4* 5*   CREA 0.44* 0.45* 0.47*   BUCR 5* 9* 11*   AGAP 5 0* 3   CA 8.2* 8.6 8.2*     No results for input(s): TBIL, ALT, ALKP, TP, ALB, GLOB, AGRAT in the last 72 hours.     No lab exists for component: SGOT   CBC w/Diff Recent Labs     06/25/21 0615 06/24/21  0539 06/24/21  0539 06/23/21  0557 06/23/21  0557   WBC 7.1  --  5.2  --  4.7   RBC 3.02*  --  3.13*  --  3.17*   HGB 8.4*  --  8.7*  --  8.9*   HCT 26.0*  --  26.7*  --  27.3*   MCV 86.1   < > 85.3   < > 86.1   MCH 27.8   < > 27.8   < > 28.1   MCHC 32.3   < > 32.6   < > 32.6   RDW 12.8   < > 12.8   < > 12.9     --  208  --  192   GRANS 62  --   --   --  64   LYMPH 28  --   -- --  25   EOS 4  --   --   --  2    < > = values in this interval not displayed. Coagulation No results for input(s): PTP, INR, APTT, INREXT, INREXT in the last 72 hours.     Iron/Ferritin Lab Results   Component Value Date/Time    Iron 23 (L) 06/21/2021 03:50 AM    TIBC 315 06/21/2021 03:50 AM    Iron % saturation 7 (L) 06/21/2021 03:50 AM    Ferritin 76 06/21/2021 03:50 AM       BNP    Cardiac Enzymes Lab Results   Component Value Date/Time    CK 90 07/06/2017 02:55 AM    CK - MB <1.0 07/06/2017 02:55 AM    CK-MB Index  07/06/2017 02:55 AM     CALCULATION NOT PERFORMED WHEN RESULT IS BELOW LINEAR LIMIT    Troponin-I, QT <0.02 10/27/2019 09:54 AM        Lactic Acid    Thyroid Studies          All Micro Results     Procedure Component Value Units Date/Time    CULTURE, URINE [[id number]] Collected: 06/25/21 0900    Order Status: Completed Specimen: Urine from Clean catch Updated: 06/25/21 1021    CULTURE, BLOOD [[id number]] Collected: 06/24/21 1007    Order Status: Completed Specimen: Blood Updated: 06/25/21 0649     Special Requests: NO SPECIAL REQUESTS        Culture result: NO GROWTH AFTER 19 HOURS       CULTURE, BLOOD [[id number]] Collected: 06/24/21 1018    Order Status: Completed Specimen: Blood Updated: 06/25/21 0649     Special Requests: NO SPECIAL REQUESTS        Culture result: NO GROWTH AFTER 19 HOURS       CULTURE, BLOOD [[id number]] Collected: 06/20/21 1715    Order Status: Completed Specimen: Blood Updated: 06/25/21 0649     Special Requests: NO SPECIAL REQUESTS        Culture result: NO GROWTH 5 DAYS       CULTURE, BLOOD [[id number]] Collected: 06/20/21 1720    Order Status: Completed Specimen: Blood Updated: 06/25/21 0649     Special Requests: NO SPECIAL REQUESTS        Culture result: NO GROWTH 5 DAYS       CULTURE, URINE [[id number]] Collected: 06/24/21 1000    Order Status: Completed Specimen: Urine from Clean catch Updated: 06/25/21 0022    CULTURE, URINE [[id number]] Collected: 06/20/21 1804    Order Status: Completed Specimen: Urine from Clean catch Updated: 06/22/21 0752     Special Requests: NO SPECIAL REQUESTS        Culture result: No growth (<1,000 CFU/ML)       RESPIRATORY VIRUS PANEL W/COVID-19, PCR [[id number]] Collected: 06/21/21 1706    Order Status: Completed Specimen: Nasopharyngeal Updated: 06/21/21 1929     Adenovirus Not detected        Coronavirus 229E Not detected        Coronavirus HKU1 Not detected        Coronavirus CVNL63 Not detected        Coronavirus OC43 Not detected        SARS-CoV-2, PCR Not detected        Metapneumovirus Not detected        Rhinovirus and Enterovirus Not detected        Influenza A Not detected        Influenza A, subtype H1 Not detected        Influenza A, subtype H3 Not detected        INFLUENZA A H1N1 PCR Not detected        Influenza B Not detected        Parainfluenza 1 Not detected        Parainfluenza 2 Not detected        Parainfluenza 3 Not detected        Parainfluenza virus 4 Not detected        RSV by PCR Not detected        B. parapertussis, PCR Not detected        Bordetella pertussis - PCR Not detected        Chlamydophila pneumoniae DNA, QL, PCR Not detected        Mycoplasma pneumoniae DNA, QL, PCR Not detected               Images:    CT (Most Recent). CT Results (most recent):  Results from Hospital Encounter encounter on 06/20/21    CT HEAD WO CONT    Narrative  CT HEAD WO CONT    History: Fever, tachycardia, headache. Comparison: 7/1/2009    TECHNIQUE: 5 mm helical scan obtained of the head were obtained from the skull  vertex through the base of the skull without intravenous contrast.    All CT scans at this facility are performed using dose optimization technique as  appropriate to a performed exam, to include automated exposure control,  adjustment of the mA and/or kV according to patient size (including appropriate  matching first site-specific examinations), or use of iterative reconstruction  technique.     BRAIN RESULT:    Acute change: No evidence of an acute infarct or other acute parenchymal  process. Hemorrhage:    No evidence of acute intracranial hemorrhage. Mass Effect / Mass Lesion:    There is no evidence of an intracranial mass or  extraaxial fluid collection. No significant mass effect. Chronic change:    None apparent. Parenchyma: There is no significant volume loss. The brain parenchyma is  otherwise within normal limits for age. Ventricles: The ventricles are within normal limits of size and  configuration for age. Other:  Possible mucus retention cyst in the left posterior ethmoid sinus. Mild  sinus mucosal thickening. Mastoid air cells are patent. There is a similar  calcification exophytic from the right frontal bone into the extra-axial space  without adjacent edema. The skull and visualized extracranial soft tissues are  otherwise grossly normal.    Impression  Mild sinus mucosal disease. XRAY (Most Recent)      EKG No results found for this or any previous visit.      2D ECHO

## 2021-06-25 NOTE — PROGRESS NOTES
Antibiotic prescriptions written by Dr Fermin Mcneal.  has faxed them down to [facility] to see if pt's insurance will cover or if any prior auth is needed. [id number]- CM spoke with pt's [employer] insurance at [phone]. They do require a prior auth to fill pt's Vantin and Zyvox. CM awaiting [facility] to fax over forms to be completed by MD to submit for authorization. 1525- CM faxed MD Fermin Mcneal signed request for auth forms back to [facility] for their review.     James Whiting RN BSN  Care Manager  [phone]

## 2021-06-25 NOTE — PROGRESS NOTES
Urology Progress Note        Assessment/Plan:     Patient Active Problem List   Diagnosis Code    UTI (urinary tract infection) N39.0    Tachycardia R00.0    Fever R50.9     ASSESSMENT:     UTI/Sepsis   S/p 21 Left ureteroscopy with laser lithotripsy with Dr. Louise Bumpers   S/p stent self removed    CT :  Trace left hydronephrosis and hydroureter, no obstruction    Ucx / NG   Bcx  NG   WBC normalized   Creat normal   LG fevers and mild tachycardia- improving      Anxiety  PCOS    PLAN:    CTU reviewed, shows early/mild pyelonephritis   LG fevers improving, Urine and blood cx both negative    Repeat Ucx today per ID  WBC normalized   Patient on Vanc and Zosyn, ID following  Will transition to oral abx per ID at discharge   Will sign off, available sooner if needed     Follow up arranged? Msg sent for close o/p follow up with Alexandra Douglas or Louise Bumpers and for repeat imaging       Camron Tuttle, YOEL Tuttle, YOEL-BC  [facility]   Pager [phone]      Subjective:     Daily Progress Note: 2021 10:25 AM    LG fevers and tachycardia slowly improving   CTU shows pyelo   On abx, patient feels the same with decreased appetite and flank pain    Objective:     Visit Vitals  /73   Pulse 90   Temp 98.4 °F (36.9 °C)   Resp 18   Ht 5' 3\" (1.6 m)   Wt 117.3 kg (258 lb 11.2 oz)   SpO2 96%   BMI 45.83 kg/m²        Temp (24hrs), Av.5 °F (36.9 °C), Min:98.3 °F (36.8 °C), Max:98.8 °F (37.1 °C)      Intake and Output:  1901 -  0700  In: 5000 [P.O.:800;  I.V.:4200]  Out: 4500 [Urine:4500]  701 - 1900  In: -   Out: 850 [Urine:850]    PHYSICAL EXAMINATION:   Visit Vitals  /73   Pulse 90   Temp 98.4 °F (36.9 °C)   Resp 18   Ht 5' 3\" (1.6 m)   Wt 117.3 kg (258 lb 11.2 oz)   SpO2 96%   BMI 45.83 kg/m²     Constitutional: anxious, tearful   HEENT: Normocephalic, Atraumatic   CV:   no edema   Respiratory: No respiratory distress or difficulties breathing   Abdomen:  Soft and nontender.  Female:  CVA tenderness: left; no suprapubic tenderness   No goins in place, some bleeding, possible menses? Skin:  Normal color. Neuro/Psych:  Alert and oriented. CTU 6/24     IMPRESSION     1. Subtle new area of hypoattenuation at the upper pole the left kidney is  suggestive of early/mild pyelonephritis.     2. Mildly improved fullness in the left renal collecting system and ureter, with  mild left periureteral stranding and wall thickening, may also represent  residual infection or inflammation from prior stent placement.     3. Possible pericholecystic fluid versus gallbladder wall thickening at the  fundus. However, the gallbladder is contracted and there is no CT evidence for  gallstones. This is nonspecific. If there is concern for cholecystitis, right  upper quadrant ultrasound could be used for further evaluation.     4. Small patchy density at the right lung base is favored to represent  atelectasis, recommend correlation for infection.     5. Hepatic steatosis and hepatosplenomegaly. CT A/P 6/20     IMPRESSION  1. Trace left hydronephrosis and hydroureter, without obstructing stone. There  is ureteral wall thickening and enhancement, which may be reactive from the  recent reported stent placement/removal versus infection.  -No definite evidence for pyelonephritis     2. Hepatic steatosis and hepatomegaly.     3. Mild splenomegaly.     4. Colonic diverticulosis. Lab/Data Review: All lab results for the last 24 hours reviewed.     Labs:     Labs: Results:   Chemistry    Recent Labs     06/25/21  0615 06/24/21  0539 06/23/21  0557   GLU 95 109* 95    136 136   K 3.7 3.6 3.8    103 102   CO2 30 33* 31   BUN 2* 4* 5*   CREA 0.44* 0.45* 0.47*   CA 8.2* 8.6 8.2*   AGAP 5 0* 3   BUCR 5* 9* 11*      CBC w/Diff Recent Labs     06/25/21  0615 06/24/21  0539 06/23/21  0557   WBC 7.1 5.2 4.7   RBC 3.02* 3.13* 3.17*   HGB 8.4* 8.7* 8.9*   HCT 26.0* 26.7* 27.3*   PLT 247 208 192   GRANS 62  --  64   LYMPH 28  --  25   EOS 4  --  2      Cultures Recent Labs     06/24/21  1018 06/24/21  1007   CULT NO GROWTH AFTER 19 HOURS NO GROWTH AFTER 19 HOURS     All Micro Results     Procedure Component Value Units Date/Time    CULTURE, URINE [[id number]] Collected: 06/25/21 0900    Order Status: Completed Specimen: Urine from Clean catch Updated: 06/25/21 1021    CULTURE, BLOOD [[id number]] Collected: 06/24/21 1007    Order Status: Completed Specimen: Blood Updated: 06/25/21 0649     Special Requests: NO SPECIAL REQUESTS        Culture result: NO GROWTH AFTER 19 HOURS       CULTURE, BLOOD [[id number]] Collected: 06/24/21 1018    Order Status: Completed Specimen: Blood Updated: 06/25/21 0649     Special Requests: NO SPECIAL REQUESTS        Culture result: NO GROWTH AFTER 19 HOURS       CULTURE, BLOOD [[id number]] Collected: 06/20/21 1715    Order Status: Completed Specimen: Blood Updated: 06/25/21 0649     Special Requests: NO SPECIAL REQUESTS        Culture result: NO GROWTH 5 DAYS       CULTURE, BLOOD [[id number]] Collected: 06/20/21 1720    Order Status: Completed Specimen: Blood Updated: 06/25/21 0649     Special Requests: NO SPECIAL REQUESTS        Culture result: NO GROWTH 5 DAYS       CULTURE, URINE [[id number]] Collected: 06/24/21 1000    Order Status: Completed Specimen: Urine from Clean catch Updated: 06/25/21 0022    CULTURE, URINE [[id number]] Collected: 06/20/21 1804    Order Status: Completed Specimen: Urine from Clean catch Updated: 06/22/21 0752     Special Requests: NO SPECIAL REQUESTS        Culture result: No growth (<1,000 CFU/ML)       RESPIRATORY VIRUS PANEL W/COVID-19, PCR [[id number]] Collected: 06/21/21 1706    Order Status: Completed Specimen: Nasopharyngeal Updated: 06/21/21 1929     Adenovirus Not detected        Coronavirus 229E Not detected        Coronavirus HKU1 Not detected        Coronavirus CVNL63 Not detected        Coronavirus OC43 Not detected        SARS-CoV-2, PCR Not detected        Metapneumovirus Not detected        Rhinovirus and Enterovirus Not detected        Influenza A Not detected        Influenza A, subtype H1 Not detected        Influenza A, subtype H3 Not detected        INFLUENZA A H1N1 PCR Not detected        Influenza B Not detected        Parainfluenza 1 Not detected        Parainfluenza 2 Not detected        Parainfluenza 3 Not detected        Parainfluenza virus 4 Not detected        RSV by PCR Not detected        B. parapertussis, PCR Not detected        Bordetella pertussis - PCR Not detected        Chlamydophila pneumoniae DNA, QL, PCR Not detected        Mycoplasma pneumoniae DNA, QL, PCR Not detected               Urinalysis Color   Date Value Ref Range Status   06/25/2021 BABATUNDE   Final     Appearance   Date Value Ref Range Status   06/25/2021 CLOUDY   Final     Specific gravity   Date Value Ref Range Status   06/25/2021 1.014 1.003 - 1.030   Final     pH (UA)   Date Value Ref Range Status   06/25/2021 7.5 5.0 - 8.0   Final     Protein   Date Value Ref Range Status   06/25/2021 30 (A) NEG mg/dL Final     Ketone   Date Value Ref Range Status   06/25/2021 Negative NEG mg/dL Final     Bilirubin   Date Value Ref Range Status   06/25/2021 SMALL (A) NEG   Final     Blood   Date Value Ref Range Status   06/25/2021 LARGE (A) NEG   Final     Urobilinogen   Date Value Ref Range Status   06/25/2021 1.0 0.2 - 1.0 EU/dL Final     Nitrites   Date Value Ref Range Status   06/25/2021 Positive (A) NEG   Final     Leukocyte Esterase   Date Value Ref Range Status   06/25/2021 SMALL (A) NEG   Final     Potassium   Date Value Ref Range Status   06/25/2021 3.7 3.5 - 5.5 mmol/L Final     Creatinine   Date Value Ref Range Status   06/25/2021 0.44 (L) 0.6 - 1.3 MG/DL Final     BUN   Date Value Ref Range Status   06/25/2021 2 (L) 7.0 - 18 MG/DL Final      PSA No results for input(s): PSA in the last 72 hours.    Coagulation Lab Results   Component Value Date/Time Prothrombin time 13.4 11/10/2019 11:45 AM    INR 1.0 11/10/2019 11:45 AM    aPTT 28.8 11/10/2019 11:45 AM

## 2021-06-26 LAB
ANION GAP SERPL CALC-SCNC: 3 MMOL/L (ref 3–18)
BACTERIA SPEC CULT: NORMAL
BACTERIA SPEC CULT: NORMAL
BUN SERPL-MCNC: 1 MG/DL (ref 7–18)
BUN/CREAT SERPL: 2 (ref 12–20)
CALCIUM SERPL-MCNC: 8.8 MG/DL (ref 8.5–10.1)
CHLORIDE SERPL-SCNC: 105 MMOL/L (ref 100–111)
CO2 SERPL-SCNC: 31 MMOL/L (ref 21–32)
CREAT SERPL-MCNC: 0.56 MG/DL (ref 0.6–1.3)
DATE LAST DOSE: ABNORMAL
GLUCOSE SERPL-MCNC: 96 MG/DL (ref 74–99)
POTASSIUM SERPL-SCNC: 3.6 MMOL/L (ref 3.5–5.5)
REPORTED DOSE,DOSE: ABNORMAL UNITS
REPORTED DOSE/TIME,TMG: 800
SERVICE CMNT-IMP: NORMAL
SERVICE CMNT-IMP: NORMAL
SODIUM SERPL-SCNC: 139 MMOL/L (ref 136–145)
VANCOMYCIN TROUGH SERPL-MCNC: 21.3 UG/ML (ref 10–20)

## 2021-06-26 PROCEDURE — 99232 SBSQ HOSP IP/OBS MODERATE 35: CPT | Performed by: INTERNAL MEDICINE

## 2021-06-26 PROCEDURE — 74011250636 HC RX REV CODE- 250/636: Performed by: INTERNAL MEDICINE

## 2021-06-26 PROCEDURE — 74011000250 HC RX REV CODE- 250: Performed by: INTERNAL MEDICINE

## 2021-06-26 PROCEDURE — 74011250637 HC RX REV CODE- 250/637: Performed by: INTERNAL MEDICINE

## 2021-06-26 PROCEDURE — 80048 BASIC METABOLIC PNL TOTAL CA: CPT

## 2021-06-26 PROCEDURE — 36415 COLL VENOUS BLD VENIPUNCTURE: CPT

## 2021-06-26 PROCEDURE — 74011250637 HC RX REV CODE- 250/637: Performed by: HOSPITALIST

## 2021-06-26 PROCEDURE — 80202 ASSAY OF VANCOMYCIN: CPT

## 2021-06-26 PROCEDURE — 65270000029 HC RM PRIVATE

## 2021-06-26 RX ORDER — OXYCODONE AND ACETAMINOPHEN 7.5; 325 MG/1; MG/1
1 TABLET ORAL
Status: DISCONTINUED | OUTPATIENT
Start: 2021-06-26 | End: 2021-06-27 | Stop reason: HOSPADM

## 2021-06-26 RX ORDER — DIPHENHYDRAMINE HCL 25 MG
50 CAPSULE ORAL ONCE
Status: COMPLETED | OUTPATIENT
Start: 2021-06-26 | End: 2021-06-26

## 2021-06-26 RX ORDER — VANCOMYCIN/0.9 % SOD CHLORIDE 1.5G/250ML
1500 PLASTIC BAG, INJECTION (ML) INTRAVENOUS EVERY 8 HOURS
Status: DISCONTINUED | OUTPATIENT
Start: 2021-06-26 | End: 2021-06-27

## 2021-06-26 RX ADMIN — Medication 1 CAPSULE: at 09:12

## 2021-06-26 RX ADMIN — VANCOMYCIN HYDROCHLORIDE 1500 MG: 10 INJECTION, POWDER, LYOPHILIZED, FOR SOLUTION INTRAVENOUS at 09:12

## 2021-06-26 RX ADMIN — OXYCODONE HYDROCHLORIDE AND ACETAMINOPHEN 1 TABLET: 7.5; 325 TABLET ORAL at 20:35

## 2021-06-26 RX ADMIN — HYDROMORPHONE HYDROCHLORIDE 2 MG: 2 INJECTION, SOLUTION INTRAMUSCULAR; INTRAVENOUS; SUBCUTANEOUS at 03:38

## 2021-06-26 RX ADMIN — CEFEPIME HYDROCHLORIDE 2 G: 2 INJECTION, POWDER, FOR SOLUTION INTRAVENOUS at 09:11

## 2021-06-26 RX ADMIN — GUAIFENESIN AND PSEUDOEPHEDRINE HYDROCHLORIDE 1 TABLET: 600; 60 TABLET, EXTENDED RELEASE ORAL at 09:12

## 2021-06-26 RX ADMIN — LORATADINE 10 MG: 10 TABLET ORAL at 09:12

## 2021-06-26 RX ADMIN — VANCOMYCIN HYDROCHLORIDE 1500 MG: 10 INJECTION, POWDER, LYOPHILIZED, FOR SOLUTION INTRAVENOUS at 20:22

## 2021-06-26 RX ADMIN — HYDROMORPHONE HYDROCHLORIDE 2 MG: 2 INJECTION, SOLUTION INTRAMUSCULAR; INTRAVENOUS; SUBCUTANEOUS at 09:12

## 2021-06-26 RX ADMIN — Medication 10 ML: at 05:38

## 2021-06-26 RX ADMIN — DIPHENHYDRAMINE HYDROCHLORIDE 50 MG: 25 CAPSULE ORAL at 15:58

## 2021-06-26 RX ADMIN — FAMOTIDINE 20 MG: 20 TABLET, FILM COATED ORAL at 09:12

## 2021-06-26 RX ADMIN — OXYCODONE HYDROCHLORIDE AND ACETAMINOPHEN 1 TABLET: 7.5; 325 TABLET ORAL at 15:58

## 2021-06-26 RX ADMIN — Medication 10 ML: at 14:00

## 2021-06-26 RX ADMIN — CALCIUM CARBONATE (ANTACID) CHEW TAB 500 MG 200 MG: 500 CHEW TAB at 09:12

## 2021-06-26 RX ADMIN — FERROUS SULFATE TAB 325 MG (65 MG ELEMENTAL FE) 650 MG: 325 (65 FE) TAB at 14:00

## 2021-06-26 RX ADMIN — FLUTICASONE PROPIONATE 2 SPRAY: 50 SPRAY, METERED NASAL at 09:20

## 2021-06-26 RX ADMIN — MONTELUKAST 10 MG: 10 TABLET, FILM COATED ORAL at 09:12

## 2021-06-26 RX ADMIN — GUAIFENESIN AND PSEUDOEPHEDRINE HYDROCHLORIDE 1 TABLET: 600; 60 TABLET, EXTENDED RELEASE ORAL at 20:35

## 2021-06-26 RX ADMIN — Medication 10 ML: at 22:13

## 2021-06-26 RX ADMIN — OXYBUTYNIN CHLORIDE 10 MG: 5 TABLET, EXTENDED RELEASE ORAL at 09:12

## 2021-06-26 NOTE — ROUTINE PROCESS
Bedside shift change report given to Charlee Gibbons RN (oncoming nurse) by Ghassan Eckert RN (offgoing nurse). Report included the following information SBAR, Procedure Summary, Intake/Output, MAR and Recent Results.

## 2021-06-26 NOTE — PROGRESS NOTES
Hospitalist Progress Note    Patient: Agustina Sagastume Age: 31 y.o. : 1990 MR#: [id number] SSN: [id number]  Date/Time: 2021 12:15 PM     DOA: 2021  PCP: Jeane Guerra NP    Subjective:     Left flank pain persists but better. Has urinary output with blood. No further fever, no leukocytosis. Tachycardia resolved, Still on IV antibiotics      Headache persists but congestion improved. Feel flush with vancomycin treatment, no redness elsewhere       Interval Hospital Course:        ROS:  NO current fever/chills, + headache, no dizziness, + facial pain, no sinus congestion,   No swallowing pain, No chest pain, no palpitation, no shortness of breath, no abd pain,no N/V  No diarrhea, + urinary complaint, no leg pain or swelling, +L flank pain    Assessment/Plan:     1. Sepsis POA (fever/leukocytosis/tachycardia/acute cystitis), resolved   2. Recurrent fever, resolved   3. Sinus tachycardia due to pain and on-going sepsis, resolved  4. Acute cystitis with hematuria, negative urine culture. Complicated by left pyelonephrosis   5. Left hydronephrosis and hydroureter on CT, no obstructing stone   6. Recent left ureteroscopy with laser lithotripsy 21, ureteral stent placed, she removed her stent on 21.   7.   Leukocytosis, resolved    8. Normocytic anemia   9. Hypovolemic hyponatremia, resolved   10. Morbid obesity   11. Hepatomegaly with known fatty liver   12. Headache, sinusitis, tension headache, stress, negative for CNS bleed, possible related to vancomycin     She is finishing cefepime and vancomycin today. Urine culture repeat yesterday, follow up. Otherwise, she can be discharged tomorrow with Vantin+linezolid   Prior authorization has been placed by [facility]. She needs close monitor by Urology outpatient   Urology ok for outpatient follow up, no further intervention as there is no abscess   Cont oxybutynin.  US renal follow up as outpatient   Note that her recent CT head showed sinusitis but no bleed,  Offer Toradol but she declined. continue singulair with claritin. Continue flonase   Can trial steroid or topomax if this is migraine, benadryl x1 for concern for skin reaction to vancomycin  D/c IV fluid, and pain control with dilaudid. Encourage ICS   Iron supplement   Daily lab  ICS     Full code   Disposition: pending clinical status     Additional Notes:    Time spent >30 minutes    Case discussed with:  [x]Patient  []Family  [x]Nursing  [x]Case Management  DVT Prophylaxis:  [x]Lovenox  []Hep SQ  []SCDs  []Coumadin   []On Heparin gtt    Signed By: Saurabh Gonzalez MD     2021 12:15 PM              Objective:   VS:   Visit Vitals  /83   Pulse (!) 106   Temp 98.4 °F (36.9 °C)   Resp 19   Ht 5' 3\" (1.6 m)   Wt 115.2 kg (253 lb 14.4 oz)   SpO2 94%   BMI 44.98 kg/m²      Tmax/24hrs: Temp (24hrs), Av.5 °F (36.9 °C), Min:98.1 °F (36.7 °C), Max:99.1 °F (37.3 °C)      Intake/Output Summary (Last 24 hours) at 2021 1215  Last data filed at 2021 1158  Gross per 24 hour   Intake 720 ml   Output 1800 ml   Net -1080 ml     Tele: sinus   General:  Cooperative, Not in acute distress, speaks in full sentence while in bed  HEENT: PERRL, EOMI, supple neck, no JVD, dry oral mucosa  Cardiovascular: S1S2 regular, no rub/gallop   Pulmonary: air entry bilaterally, no wheezing, + crackle  GI:  Soft, non tender, non distended, +bs, no guarding   Left lower back tenderness   Extremities:  No pedal edema, +distal pulses appreciated   Neuro: AOx3, moving all extremities, no gross deficit.          Additional:       Current Facility-Administered Medications   Medication Dose Route Frequency    cefepime (MAXIPIME) 2 g in sterile water (preservative free) 10 mL IV syringe  2 g IntraVENous Q8H    vancomycin (VANCOCIN) 1500 mg in  ml infusion  1,500 mg IntraVENous Q8H    Vancomycin Lab Information  1 Each Other Once per day on Sat    PSEUDOEPHEDRINE-guaiFENesin (MUCINEX D)  mg per tablet 1 Tablet  1 Tablet Oral Q12H    montelukast (SINGULAIR) tablet 10 mg  10 mg Oral DAILY    calcium carbonate (TUMS) chewable tablet 200 mg [elemental]  200 mg Oral TID WITH MEALS    bisacodyL (DULCOLAX) suppository 10 mg  10 mg Rectal DAILY PRN    ferrous sulfate tablet 650 mg  2 Tablet Oral EVERY OTHER DAY    fluticasone propionate (FLONASE) 50 mcg/actuation nasal spray 2 Spray  2 Spray Both Nostrils DAILY    loratadine (CLARITIN) tablet 10 mg  10 mg Oral DAILY    HYDROmorphone (DILAUDID) injection 2 mg  2 mg IntraVENous Q4H PRN    L. acidophilus,casei,rhamnosus (BIO-K PLUS) capsule 1 Capsule  1 Capsule Oral DAILY    famotidine (PEPCID) tablet 20 mg  20 mg Oral DAILY    sodium chloride (NS) flush 5-10 mL  5-10 mL IntraVENous PRN    oxybutynin chloride XL (DITROPAN XL) tablet 10 mg  10 mg Oral DAILY    sodium chloride (NS) flush 5-40 mL  5-40 mL IntraVENous Q8H    sodium chloride (NS) flush 5-40 mL  5-40 mL IntraVENous PRN    acetaminophen (TYLENOL) tablet 650 mg  650 mg Oral Q6H PRN    Or    acetaminophen (TYLENOL) suppository 650 mg  650 mg Rectal Q6H PRN    polyethylene glycol (MIRALAX) packet 17 g  17 g Oral DAILY PRN    ondansetron (ZOFRAN ODT) tablet 4 mg  4 mg Oral Q8H PRN    Or    ondansetron (ZOFRAN) injection 4 mg  4 mg IntraVENous Q6H PRN    [Held by provider] enoxaparin (LOVENOX) injection 40 mg  40 mg SubCUTAneous Q24H            Lab/Data Review:  Labs: Results:       Chemistry Recent Labs     06/26/21  0536 06/25/21  0615 06/24/21  0539   GLU 96 95 109*    139 136   K 3.6 3.7 3.6    104 103   CO2 31 30 33*   BUN 1* 2* 4*   CREA 0.56* 0.44* 0.45*   BUCR 2* 5* 9*   AGAP 3 5 0*   CA 8.8 8.2* 8.6     No results for input(s): TBIL, ALT, ALKP, TP, ALB, GLOB, AGRAT in the last 72 hours.     No lab exists for component: SGOT   CBC w/Diff Recent Labs     06/25/21  0615 06/24/21  0539 06/24/21  0539   WBC 7.1  --  5.2   RBC 3.02*  --  3.13*   HGB 8.4* --  8.7*   HCT 26.0*  --  26.7*   MCV 86.1   < > 85.3   MCH 27.8   < > 27.8   MCHC 32.3   < > 32.6   RDW 12.8   < > 12.8     --  208   GRANS 62  --   --    LYMPH 28  --   --    EOS 4  --   --     < > = values in this interval not displayed. Coagulation No results for input(s): PTP, INR, APTT, INREXT, INREXT in the last 72 hours.     Iron/Ferritin Lab Results   Component Value Date/Time    Iron 23 (L) 06/21/2021 03:50 AM    TIBC 315 06/21/2021 03:50 AM    Iron % saturation 7 (L) 06/21/2021 03:50 AM    Ferritin 76 06/21/2021 03:50 AM       BNP    Cardiac Enzymes Lab Results   Component Value Date/Time    CK 90 07/06/2017 02:55 AM    CK - MB <1.0 07/06/2017 02:55 AM    CK-MB Index  07/06/2017 02:55 AM     CALCULATION NOT PERFORMED WHEN RESULT IS BELOW LINEAR LIMIT    Troponin-I, QT <0.02 10/27/2019 09:54 AM        Lactic Acid    Thyroid Studies          All Micro Results     Procedure Component Value Units Date/Time    CULTURE, BLOOD [[id number]] Collected: 06/24/21 1018    Order Status: Completed Specimen: Blood Updated: 06/26/21 0620     Special Requests: NO SPECIAL REQUESTS        Culture result: NO GROWTH 2 DAYS       CULTURE, BLOOD [[id number]] Collected: 06/24/21 1007    Order Status: Completed Specimen: Blood Updated: 06/26/21 0620     Special Requests: NO SPECIAL REQUESTS        Culture result: NO GROWTH 2 DAYS       CULTURE, BLOOD [[id number]] Collected: 06/20/21 1720    Order Status: Completed Specimen: Blood Updated: 06/26/21 0620     Special Requests: NO SPECIAL REQUESTS        Culture result: NO GROWTH 6 DAYS       CULTURE, BLOOD [[id number]] Collected: 06/20/21 1715    Order Status: Completed Specimen: Blood Updated: 06/26/21 0620     Special Requests: NO SPECIAL REQUESTS        Culture result: NO GROWTH 6 DAYS       CULTURE, URINE [[id number]] Collected: 06/25/21 0900    Order Status: Completed Specimen: Urine from Clean catch Updated: 06/26/21 0016    CULTURE, URINE [[id number]] Collected: 06/24/21 1000    Order Status: Completed Specimen: Urine from Clean catch Updated: 06/25/21 1922     Special Requests: NO SPECIAL REQUESTS        Culture result: No growth (<1,000 CFU/ML)       CULTURE, URINE [[id number]] Collected: 06/20/21 1804    Order Status: Completed Specimen: Urine from Clean catch Updated: 06/22/21 0752     Special Requests: NO SPECIAL REQUESTS        Culture result: No growth (<1,000 CFU/ML)       RESPIRATORY VIRUS PANEL W/COVID-19, PCR [[id number]] Collected: 06/21/21 1706    Order Status: Completed Specimen: Nasopharyngeal Updated: 06/21/21 1929     Adenovirus Not detected        Coronavirus 229E Not detected        Coronavirus HKU1 Not detected        Coronavirus CVNL63 Not detected        Coronavirus OC43 Not detected        SARS-CoV-2, PCR Not detected        Metapneumovirus Not detected        Rhinovirus and Enterovirus Not detected        Influenza A Not detected        Influenza A, subtype H1 Not detected        Influenza A, subtype H3 Not detected        INFLUENZA A H1N1 PCR Not detected        Influenza B Not detected        Parainfluenza 1 Not detected        Parainfluenza 2 Not detected        Parainfluenza 3 Not detected        Parainfluenza virus 4 Not detected        RSV by PCR Not detected        B. parapertussis, PCR Not detected        Bordetella pertussis - PCR Not detected        Chlamydophila pneumoniae DNA, QL, PCR Not detected        Mycoplasma pneumoniae DNA, QL, PCR Not detected               Images:    CT (Most Recent). CT Results (most recent):  Results from Hospital Encounter encounter on 06/20/21    CT HEAD WO CONT    Narrative  CT HEAD WO CONT    History: Fever, tachycardia, headache.     Comparison: 7/1/2009    TECHNIQUE: 5 mm helical scan obtained of the head were obtained from the skull  vertex through the base of the skull without intravenous contrast.    All CT scans at this facility are performed using dose optimization technique as  appropriate to a performed exam, to include automated exposure control,  adjustment of the mA and/or kV according to patient size (including appropriate  matching first site-specific examinations), or use of iterative reconstruction  technique. BRAIN RESULT:    Acute change:   No evidence of an acute infarct or other acute parenchymal  process. Hemorrhage:    No evidence of acute intracranial hemorrhage. Mass Effect / Mass Lesion:    There is no evidence of an intracranial mass or  extraaxial fluid collection. No significant mass effect. Chronic change:    None apparent. Parenchyma: There is no significant volume loss. The brain parenchyma is  otherwise within normal limits for age. Ventricles: The ventricles are within normal limits of size and  configuration for age. Other:  Possible mucus retention cyst in the left posterior ethmoid sinus. Mild  sinus mucosal thickening. Mastoid air cells are patent. There is a similar  calcification exophytic from the right frontal bone into the extra-axial space  without adjacent edema. The skull and visualized extracranial soft tissues are  otherwise grossly normal.    Impression  Mild sinus mucosal disease. XRAY (Most Recent)      EKG No results found for this or any previous visit.      2D ECHO

## 2021-06-26 NOTE — PROGRESS NOTES
Problem: Pain  Goal: *Control of Pain  Outcome: Progressing Towards Goal     Problem: Falls - Risk of  Goal: *Absence of Falls  Description: Document Mark Fall Risk and appropriate interventions in the flowsheet.   Outcome: Progressing Towards Goal  Note: Fall Risk Interventions:            Medication Interventions: Evaluate medications/consider consulting pharmacy, Patient to call before getting OOB, Teach patient to arise slowly    Elimination Interventions: Call light in reach, Patient to call for help with toileting needs, Toilet paper/wipes in reach    History of Falls Interventions: Door open when patient unattended, Evaluate medications/consider consulting pharmacy         Problem: Urinary Tract Infection - Adult  Goal: *Absence of infection signs and symptoms  Outcome: Progressing Towards Goal     Problem: Patient Education: Go to Patient Education Activity  Goal: Patient/Family Education  Outcome: Progressing Towards Goal

## 2021-06-26 NOTE — PROGRESS NOTES
1600 - pt iv infiltrated after iv antibiotic, cvt sd & icu nursed both attempted to place new piv, MD made aware    1910 - nursing supervisor at bedside to attempt iv access    Bedside shift change report given to Remedios Hoskins RN (oncoming nurse) by Hugo Cooley RN (offgoing nurse). Report included the following information SBAR, Kardex, Procedure Summary, Intake/Output, MAR, Recent Results and Cardiac Rhythm NSR.

## 2021-06-27 VITALS
BODY MASS INDEX: 44.3 KG/M2 | HEART RATE: 70 BPM | SYSTOLIC BLOOD PRESSURE: 114 MMHG | OXYGEN SATURATION: 93 % | DIASTOLIC BLOOD PRESSURE: 76 MMHG | RESPIRATION RATE: 18 BRPM | WEIGHT: 250 LBS | HEIGHT: 63 IN | TEMPERATURE: 97.5 F

## 2021-06-27 LAB
ANION GAP SERPL CALC-SCNC: 4 MMOL/L (ref 3–18)
BACTERIA SPEC CULT: NORMAL
BUN SERPL-MCNC: 4 MG/DL (ref 7–18)
BUN/CREAT SERPL: 7 (ref 12–20)
CALCIUM SERPL-MCNC: 8.5 MG/DL (ref 8.5–10.1)
CHLORIDE SERPL-SCNC: 105 MMOL/L (ref 100–111)
CO2 SERPL-SCNC: 32 MMOL/L (ref 21–32)
CREAT SERPL-MCNC: 0.58 MG/DL (ref 0.6–1.3)
GLUCOSE SERPL-MCNC: 87 MG/DL (ref 74–99)
POTASSIUM SERPL-SCNC: 3.7 MMOL/L (ref 3.5–5.5)
SERVICE CMNT-IMP: NORMAL
SODIUM SERPL-SCNC: 141 MMOL/L (ref 136–145)

## 2021-06-27 PROCEDURE — 74011250637 HC RX REV CODE- 250/637: Performed by: HOSPITALIST

## 2021-06-27 PROCEDURE — 99239 HOSP IP/OBS DSCHRG MGMT >30: CPT | Performed by: INTERNAL MEDICINE

## 2021-06-27 PROCEDURE — 36415 COLL VENOUS BLD VENIPUNCTURE: CPT

## 2021-06-27 PROCEDURE — 74011250636 HC RX REV CODE- 250/636: Performed by: INTERNAL MEDICINE

## 2021-06-27 PROCEDURE — 74011000250 HC RX REV CODE- 250: Performed by: INTERNAL MEDICINE

## 2021-06-27 PROCEDURE — 74011250637 HC RX REV CODE- 250/637: Performed by: INTERNAL MEDICINE

## 2021-06-27 PROCEDURE — 80048 BASIC METABOLIC PNL TOTAL CA: CPT

## 2021-06-27 RX ORDER — FLUTICASONE PROPIONATE 50 MCG
2 SPRAY, SUSPENSION (ML) NASAL DAILY
Qty: 1 BOTTLE | Refills: 0 | Status: SHIPPED | OUTPATIENT
Start: 2021-06-27 | End: 2021-07-20 | Stop reason: ALTCHOICE

## 2021-06-27 RX ORDER — MONTELUKAST SODIUM 10 MG/1
10 TABLET ORAL DAILY
Qty: 30 TABLET | Refills: 0 | Status: SHIPPED | OUTPATIENT
Start: 2021-06-28 | End: 2021-07-19 | Stop reason: ALTCHOICE

## 2021-06-27 RX ORDER — LEVOFLOXACIN 750 MG/1
750 TABLET ORAL DAILY
Qty: 10 TABLET | Refills: 0 | Status: SHIPPED | OUTPATIENT
Start: 2021-06-27 | End: 2021-07-07

## 2021-06-27 RX ORDER — VANCOMYCIN HYDROCHLORIDE
1250 EVERY 8 HOURS
Status: DISCONTINUED | OUTPATIENT
Start: 2021-06-27 | End: 2021-06-27

## 2021-06-27 RX ORDER — VANCOMYCIN/0.9 % SOD CHLORIDE 1.5G/250ML
1500 PLASTIC BAG, INJECTION (ML) INTRAVENOUS EVERY 8 HOURS
Status: DISCONTINUED | OUTPATIENT
Start: 2021-06-27 | End: 2021-06-27 | Stop reason: HOSPADM

## 2021-06-27 RX ADMIN — Medication 10 ML: at 06:00

## 2021-06-27 RX ADMIN — FLUTICASONE PROPIONATE 2 SPRAY: 50 SPRAY, METERED NASAL at 09:00

## 2021-06-27 RX ADMIN — CEFEPIME HYDROCHLORIDE 2 G: 2 INJECTION, POWDER, FOR SOLUTION INTRAVENOUS at 00:19

## 2021-06-27 RX ADMIN — VANCOMYCIN HYDROCHLORIDE 1500 MG: 10 INJECTION, POWDER, LYOPHILIZED, FOR SOLUTION INTRAVENOUS at 12:29

## 2021-06-27 RX ADMIN — LORATADINE 10 MG: 10 TABLET ORAL at 09:02

## 2021-06-27 RX ADMIN — CEFEPIME HYDROCHLORIDE 2 G: 2 INJECTION, POWDER, FOR SOLUTION INTRAVENOUS at 09:02

## 2021-06-27 RX ADMIN — MONTELUKAST 10 MG: 10 TABLET, FILM COATED ORAL at 09:00

## 2021-06-27 RX ADMIN — Medication 1 CAPSULE: at 09:01

## 2021-06-27 RX ADMIN — CALCIUM CARBONATE (ANTACID) CHEW TAB 500 MG 200 MG: 500 CHEW TAB at 09:01

## 2021-06-27 RX ADMIN — VANCOMYCIN HYDROCHLORIDE 1500 MG: 10 INJECTION, POWDER, LYOPHILIZED, FOR SOLUTION INTRAVENOUS at 05:10

## 2021-06-27 RX ADMIN — OXYBUTYNIN CHLORIDE 10 MG: 5 TABLET, EXTENDED RELEASE ORAL at 09:01

## 2021-06-27 RX ADMIN — OXYCODONE HYDROCHLORIDE AND ACETAMINOPHEN 1 TABLET: 7.5; 325 TABLET ORAL at 01:41

## 2021-06-27 RX ADMIN — OXYCODONE HYDROCHLORIDE AND ACETAMINOPHEN 1 TABLET: 7.5; 325 TABLET ORAL at 09:02

## 2021-06-27 RX ADMIN — FAMOTIDINE 20 MG: 20 TABLET, FILM COATED ORAL at 09:01

## 2021-06-27 RX ADMIN — GUAIFENESIN AND PSEUDOEPHEDRINE HYDROCHLORIDE 1 TABLET: 600; 60 TABLET, EXTENDED RELEASE ORAL at 09:01

## 2021-06-27 NOTE — ROUTINE PROCESS
Bedside and Verbal shift change report given to Wilfredo Shell RN (oncoming nurse) by Virgilio Castillo RN, BSN (offgoing nurse). Report included the following information SBAR, Kardex, ED Summary, Intake/Output, MAR, Recent Results and Cardiac Rhythm Sinus Rhythm.     Virgilio Castillo RN, BSN

## 2021-06-27 NOTE — PROGRESS NOTES
Called pt's [employer] pre-authorization line at [phone]. Just a recording stating that \"you have reached us after hours. \"  Unable to speak to an agent.      Ashton Dance, RN BSN  Care Manager  [phone]

## 2021-06-27 NOTE — DISCHARGE SUMMARY
Discharge Summary    Patient: Linda Link               Sex: female          DOA: 6/20/2021         YOB: 1990      Age:  31 y.o.        LOS:  LOS: 7 days                Admit Date: 6/20/2021    Discharge Date: 6/27/2021    Primary care physician: Geovany Jackson NP    Discharge Diagnoses:    1. Sepsis, resolved   2. Fever and sinus tachycardia, resolved   3. Acute cystitis with hematuria, negative urine culture. 4.  Complicated cystitis with early left pyelonephrosis   5. Left hydronephrosis and hydroureter on CT, no obstructing stone   6. Recent left ureteroscopy with laser lithotripsy 6/16/21, ureteral stent placed, she removed her stent on 6/19/21.   7.  Leukocytosis, resolved    8. Normocytic anemia, iron deficiency anemia   9. Hypovolemic hyponatremia, resolved   10. Morbid obesity   11. Hepatomegaly with known fatty liver   12. Headache, sinusitis, tension headache, stress, negative for CNS bleed, possible related to vancomycin, improved        Discharge Condition: Good  Disposition: home   Code Status: full code     Follow up for Primary Care Physician:  1) she needs to continue Linezolid and Levofloxacin for 10 days per ID recommendation  2) she needs follow up with Urology for further care   3)  She can resume taking her oral iron pill after her antibiotic course   4)  She can follow up with neurology as needed. Hospital Course:   31 y.o female with anxiety, morbid obesity, iron deficiency anemia, recently renal stone s/p cystoscopy, retrograded pyelogram and JJ ureteral stent placement, left ureteroscopy with laser lithotripsy on 6/16/21 presented with left flank pain, fever, sweating. She had her symptom the day after she pulled out her stent. In the ER, she was found to have sepsis with dirty UA. She was started on IV antibiotics.  CT abd/pelv with left hydroneprhosis and hydroureter without obstructing stone, ureteral wall thickening with no clear evidence of pyelonephritis. She was seen by Urology and ID, which added vancomycin to her antibiotic course. Her hospitalization was prolonged due to persistent fever and tachycardia, left flank pain. She continue on her IV antibiotics course and slowly improved in several days. CTU was redone due to concern for abscess formation but there was just early side of pyelonephritis. She had recurrent gross hematuria but resolved. Repeat urine culture without growth. ID recommended outpt Linezolid and cefepime but her insurance did not authorized cefepime, hence it was changed to levofloxacin. Urology recommended outpatient follow up with repeat US renal.   During this hospitalization, she developed severe headache which CT head was negative. She did have sinusitis and symptomatic care was given. Neurology evaluated and think tenson headache was a component. This has resolved at discharge. She has low iron which was chronic for her. She take iron pill at home       Last 24 Hours: no fever >3 days, HR has been normalized. She has persistent pain in her lower back but more tolerated and has not been using any narcotic. Tolerate IV vancomycin and cefepime. ID recommended Linezolid and Cefepime at home but her insurance did not authorized cefepime, hence she was switch to Levofloxacin with linezolid.    No further blood in her urine   Her headache improved with benadryl     ROS: No current fever/chills, no headache, no dizziness, no facial pain, no sinus congestion,   No swallowing pain, No chest pain, no palpitation, no shortness of breath, no abd pain,  No diarrhea, no urinary complaint, no leg pain or swelling    VS:   Visit Vitals  /76 (BP 1 Location: Right upper arm, BP Patient Position: Semi fowlers)   Pulse 70   Temp 97.5 °F (36.4 °C)   Resp 18   Ht 5' 3\" (1.6 m)   Wt 113.4 kg (250 lb)   SpO2 93%   BMI 44.29 kg/m²      Tmax/24hrs: Temp (24hrs), Av.2 °F (36.8 °C), Min:97.5 °F (36.4 °C), Max:98.8 °F (37.1 °C)      Intake/Output Summary (Last 24 hours) at 6/27/2021 1405  Last data filed at 6/27/2021 1233  Gross per 24 hour   Intake 240 ml   Output 2850 ml   Net -2610 ml       Tele: sinus  General:  Cooperative, Not in acute distress, speaks in full sentence while in bed  HEENT: PERRL, EOMI, supple neck, no JVD, dry oral mucosa  Cardiovascular: S1S2 regular, no rub/gallop   Pulmonary: Clear air entry bilaterally, no wheezing, no crackle  GI:  Soft, non tender, non distended, +bs, no guarding   : left lower flank pain improved   Extremities:  No pedal edema, +distal pulses appreciated   Neuro: AOx3, moving all extremities, no gross deficit. Consults: *  Urology: Dr. Jesus Monreal   ID : Dr. Donavan Lassiter:   CT Results (most recent):  Results from Hospital Encounter encounter on 06/20/21    CT UROGRAM W WO CONT    Narrative  EXAM: CT UROGRAM    CLINICAL INDICATION/HISTORY: gross hematuria, fevers, r/o abscess/hydro . COMPARISON: CT abdomen/pelvis 6/20/2021    TECHNIQUE: CT abdomen and pelvis performed prior to and following administration  of 98 cc of Isovue IV contrast. CT urogram protocol. In addition postprocessing  was performed on an independent imaging workstation. Postprocessing included  creation of MIPS as well as VR 3D reconstructions including shading surface  volume rendered images. All CT scans at this facility are performed using dose optimization technique as  appropriate to the performed examination, to include automated exposure control,  adjustment of the mA and/or kV according to patient's size (including  appropriate matching for site-specific examinations), or use of an iterative  reconstruction technique. FINDINGS:  LEFT KIDNEY/URETER:  No calculi. No solid mass. Mildly improved trace fullness of the left renal collecting system and ureter. Mildly improved mild left periureteral stranding and wall thickening.   No urothelial filling defects along proximal collecting system and ureter. Subtle patchy area of hypoattenuation posterior right upper pole (5, 55), not  seen on prior. RIGHT KIDNEY/URETER:  No calculi. No solid mass. No hydronephrosis. No urothelial filling defects along proximal collecting system and ureter. Mild  left ureteral wall thickening. BLADDER: No abnormality evident. Lower chest: Mild patchy density right lung base. No pleural effusion. Liver: Hepatic steatosis and hepatomegaly. Biliary: Possible gallbladder wall thickening or trace pericholecystic fluid at  the fundus, new from prior. However, the gallbladder is contracted. No biliary  ductal dilatation. Pancreas: Unremarkable. Spleen: Mildly enlarged, similar to prior. Adrenal glands: Unremarkable. Pelvic Organs: Unremarkable. Stomach, Small Bowel and Colon: Colonic diverticulosis. Normal appendix. No  evidence for bowel obstruction or inflammation. Lymph nodes: No lymphadenopathy. Vessels: Unremarkable for age. Peritoneal Spaces: No free fluid or free air. Body wall: Unremarkable. Bones: No acute abnormality. Impression  1. Subtle new area of hypoattenuation at the upper pole the left kidney is  suggestive of early/mild pyelonephritis. 2. Mildly improved fullness in the left renal collecting system and ureter, with  mild left periureteral stranding and wall thickening, may also represent  residual infection or inflammation from prior stent placement. 3. Possible pericholecystic fluid versus gallbladder wall thickening at the  fundus. However, the gallbladder is contracted and there is no CT evidence for  gallstones. This is nonspecific. If there is concern for cholecystitis, right  upper quadrant ultrasound could be used for further evaluation. 4. Small patchy density at the right lung base is favored to represent  atelectasis, recommend correlation for infection. 5. Hepatic steatosis and hepatosplenomegaly.     Thank you for enabling us to participate in the care of this patient. XR Results (most recent):  Results from Hospital Encounter encounter on 06/20/21    XR CHEST PA LAT    Narrative  EXAM: XR CHEST PA LAT    INDICATION: 30 years Female. cough, fever. ADDITIONAL HISTORY: None. TECHNIQUE: Frontal view of the chest.    COMPARISON: Chest radiograph 6/20/2021    FINDINGS:    The cardiac silhouette is within normal limits in size. Pulmonary vasculature  appears within normal limits. No confluent airspace opacity is appreciated. No  definite evidence of pleural effusion or pneumothorax. Osseous structures are  unchanged in appearance. Unchanged mild anterior vertebral wedging at the  thoracolumbar junction. Impression  No radiographic evidence of acute cardiopulmonary process. Lab/Data Review:  Labs: Results:       Chemistry Recent Labs     06/27/21  0540 06/26/21  0536 06/25/21  0615   GLU 87 96 95    139 139   K 3.7 3.6 3.7    105 104   CO2 32 31 30   BUN 4* 1* 2*   CREA 0.58* 0.56* 0.44*   CA 8.5 8.8 8.2*   AGAP 4 3 5   BUCR 7* 2* 5*      CBC w/Diff Recent Labs     06/25/21  0615   WBC 7.1   RBC 3.02*   HGB 8.4*   HCT 26.0*      GRANS 62   LYMPH 28   EOS 4      Coagulation No results for input(s): PTP, INR, APTT, INREXT in the last 72 hours. Iron/Ferritin No results for input(s): IRON in the last 72 hours. No lab exists for component: TIBCCALC   BNP No results for input(s): BNPP in the last 72 hours. Cardiac Enzymes No results for input(s): CPK, CKND1, ADDIE in the last 72 hours. No lab exists for component: CKRMB, TROIP   Liver Enzymes No results for input(s): TP, ALB, TBIL, AP in the last 72 hours. No lab exists for component: SGOT, GPT, DBIL   Thyroid Studies No results for input(s): T4, T3U, TSH, TSHEXT in the last 72 hours.     No lab exists for component: T3RU       All Micro Results     Procedure Component Value Units Date/Time    CULTURE, BLOOD [[id number]] Collected: 06/24/21 1007    Order Status: Completed Specimen: Blood Updated: 06/27/21 0803     Special Requests: NO SPECIAL REQUESTS        Culture result: NO GROWTH 3 DAYS       CULTURE, BLOOD [[id number]] Collected: 06/24/21 1018    Order Status: Completed Specimen: Blood Updated: 06/27/21 0803     Special Requests: NO SPECIAL REQUESTS        Culture result: NO GROWTH 3 DAYS       CULTURE, URINE [[id number]] Collected: 06/25/21 0900    Order Status: Completed Specimen: Urine from Clean catch Updated: 06/27/21 0753     Special Requests: NO SPECIAL REQUESTS        Culture result: No growth (<1,000 CFU/ML)       CULTURE, BLOOD [[id number]] Collected: 06/20/21 1720    Order Status: Completed Specimen: Blood Updated: 06/26/21 0620     Special Requests: NO SPECIAL REQUESTS        Culture result: NO GROWTH 6 DAYS       CULTURE, BLOOD [[id number]] Collected: 06/20/21 1715    Order Status: Completed Specimen: Blood Updated: 06/26/21 0620     Special Requests: NO SPECIAL REQUESTS        Culture result: NO GROWTH 6 DAYS       CULTURE, URINE [[id number]] Collected: 06/24/21 1000    Order Status: Completed Specimen: Urine from Clean catch Updated: 06/25/21 1922     Special Requests: NO SPECIAL REQUESTS        Culture result: No growth (<1,000 CFU/ML)       CULTURE, URINE [[id number]] Collected: 06/20/21 1804    Order Status: Completed Specimen: Urine from Clean catch Updated: 06/22/21 0752     Special Requests: NO SPECIAL REQUESTS        Culture result: No growth (<1,000 CFU/ML)       RESPIRATORY VIRUS PANEL W/COVID-19, PCR [[id number]] Collected: 06/21/21 1706    Order Status: Completed Specimen: Nasopharyngeal Updated: 06/21/21 1929     Adenovirus Not detected        Coronavirus 229E Not detected        Coronavirus HKU1 Not detected        Coronavirus CVNL63 Not detected        Coronavirus OC43 Not detected        SARS-CoV-2, PCR Not detected        Metapneumovirus Not detected        Rhinovirus and Enterovirus Not detected        Influenza A Not detected Influenza A, subtype H1 Not detected        Influenza A, subtype H3 Not detected        INFLUENZA A H1N1 PCR Not detected        Influenza B Not detected        Parainfluenza 1 Not detected        Parainfluenza 2 Not detected        Parainfluenza 3 Not detected        Parainfluenza virus 4 Not detected        RSV by PCR Not detected        B. parapertussis, PCR Not detected        Bordetella pertussis - PCR Not detected        Chlamydophila pneumoniae DNA, QL, PCR Not detected        Mycoplasma pneumoniae DNA, QL, PCR Not detected                   Medications at discharge  including reasons for change and indications for new ones:   Current Discharge Medication List      START taking these medications    Details   fluticasone propionate (FLONASE) 50 mcg/actuation nasal spray 2 Sprays by Both Nostrils route daily. Indications: inflammation of the nose due to an allergy  Qty: 1 Bottle, Refills: 0  Start date: 6/27/2021      L. acidophilus,casei,rhamnosus (BIO-K PLUS) 50 billion cell cpDR capsule Take 1 Capsule by mouth daily. Qty: 14 Capsule, Refills: 0  Start date: 6/28/2021      montelukast (SINGULAIR) 10 mg tablet Take 1 Tablet by mouth daily. Indications: seasonal runny nose  Qty: 30 Tablet, Refills: 0  Start date: 6/28/2021      levoFLOXacin (LEVAQUIN) 750 mg tablet Take 1 Tablet by mouth daily for 10 days. Indications: bacterial urinary tract infection  Qty: 10 Tablet, Refills: 0  Start date: 6/27/2021, End date: 7/7/2021      linezolid (ZYVOX) 600 mg tablet Take 1 Tablet by mouth two (2) times a day. 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 Qty: 30 Tablet, Refills: 1      acetaminophen (TYLENOL) 325 mg tablet Take 2 Tabs by mouth every four (4) hours as needed for Pain.   Qty: 20 Tab, Refills: 0      prenatal multivit-ca-min-fe-fa (PRENATAL VITAMIN) tab Take 1 Tab by mouth daily.   Qty: 30 Tab, Refills: 0                     Pending laboratory work and tests: none    Activity: Activity as tolerated    Diet: Cardiac Diet and Low fat, Low cholesterol    Wound Care: None needed      Time spent >30 minutes  Megha Scott MD  6/27/2021  2:05 PM

## 2021-06-27 NOTE — PROGRESS NOTES
Faxed Zyvox  And vantin prescription to [facility] to run scripts. Zyvox approved. Vantin denied. Dr Patricia Maxwell called Dr Felicia Mcneal to inform. Patient d/c with Zyvox and Levaquin. D/C orders received. No needs identified by . Chart reviewed. Pt will be transported home by self.  available as needed.     David Estes RN BSN  Care Manager  [phone]

## 2021-06-27 NOTE — PROGRESS NOTES
Problem: Pain  Goal: *Control of Pain  Outcome: Progressing Towards Goal     Problem: Falls - Risk of  Goal: *Absence of Falls  Description: Document Mark Fall Risk and appropriate interventions in the flowsheet.   Outcome: Progressing Towards Goal  Note: Fall Risk Interventions:            Medication Interventions: Patient to call before getting OOB, Teach patient to arise slowly    Elimination Interventions: Call light in reach    History of Falls Interventions: Evaluate medications/consider consulting pharmacy         Problem: Urinary Tract Infection - Adult  Goal: *Absence of infection signs and symptoms  Outcome: Progressing Towards Goal     Problem: Patient Education: Go to Patient Education Activity  Goal: Patient/Family Education  Outcome: Progressing Towards Goal

## 2021-06-27 NOTE — DISCHARGE INSTRUCTIONS
Discharge Instructions    Patient: Geri Almonte MRN: [id number]  CSN: [id number]    YOB: 1990  Age: 31 y.o. Sex: female    DOA: 6/20/2021 LOS:  LOS: 7 days   Discharge Date:      ACUTE DIAGNOSES:  1. Sepsis, resolved   2. Fever and sinus tachycardia, resolved   3. Acute cystitis with hematuria, negative urine culture. 4.  Complicated by left pyelonephrosis   5. Left hydronephrosis and hydroureter on CT, no obstructing stone   6. Recent left ureteroscopy with laser lithotripsy 6/16/21, ureteral stent placed, she removed her stent on 6/19/21.   7.   Leukocytosis, resolved    8. Normocytic anemia, iron deficiency anemia   9. Hypovolemic hyponatremia, resolved   10. Morbid obesity   11. Hepatomegaly with known fatty liver   12. Headache, sinusitis, tension headache, stress, negative for CNS bleed, possible related to vancomycin, improved           DISCHARGE MEDICATIONS:       · It is important that you take the medication exactly as they are prescribed. · Keep your medication in the bottles provided by the pharmacist and keep a list of the medication names, dosages, and times to be taken in your wallet. · Do not take other medications without consulting your doctor. DIET:  Cardiac Diet and Low fat, Low cholesterol    ACTIVITY: Activity as tolerated    ADDITIONAL INFORMATION: If you experience any of the following symptoms then please call your primary care physician or return to the emergency room if you cannot get hold of your doctor: Fever, chills, nausea, vomiting, diarrhea, change in mentation, falling, bleeding, shortness of breath. FOLLOW UP CARE:  Dr. Emory Urbina, NP  you are to call and set up an appointment to see them in 2 weeks. Follow-up with Dr Blake Gamboa, urology [facility] in 4-6 weeks       Information obtained by :  I understand that if any problems occur once I am at home I am to contact my physician.     I understand and acknowledge receipt of the instructions indicated above.                                                                                                                                            Physician's or R.N.'s Signature                                                                  Date/Time                                                                                                                                              Patient or Representative Signature                                                          Date/Time    Liss Valero MD  6/27/2021  1:59 PM

## 2021-06-30 LAB
BACTERIA SPEC CULT: NORMAL
BACTERIA SPEC CULT: NORMAL
SERVICE CMNT-IMP: NORMAL
SERVICE CMNT-IMP: NORMAL

## 2021-07-01 ENCOUNTER — HOSPITAL ENCOUNTER (EMERGENCY)
Age: 31
Discharge: HOME | End: 2021-07-01
Attending: EMERGENCY MEDICINE
Payer: MEDICAID

## 2021-07-01 ENCOUNTER — APPOINTMENT (OUTPATIENT)
Dept: GENERAL RADIOLOGY | Age: 31
End: 2021-07-01
Attending: EMERGENCY MEDICINE
Payer: MEDICAID

## 2021-07-01 VITALS
HEART RATE: 123 BPM | WEIGHT: 241.1 LBS | RESPIRATION RATE: 18 BRPM | SYSTOLIC BLOOD PRESSURE: 131 MMHG | BODY MASS INDEX: 42.71 KG/M2 | OXYGEN SATURATION: 96 % | DIASTOLIC BLOOD PRESSURE: 85 MMHG | TEMPERATURE: 98.2 F

## 2021-07-01 DIAGNOSIS — J42 CHRONIC BRONCHITIS, UNSPECIFIED CHRONIC BRONCHITIS TYPE (HCC): Primary | ICD-10-CM

## 2021-07-01 LAB
ALBUMIN SERPL-MCNC: 3.8 G/DL (ref 3.4–5)
ALBUMIN/GLOB SERPL: 0.7 {RATIO} (ref 0.8–1.7)
ALP SERPL-CCNC: 56 U/L (ref 45–117)
ALT SERPL-CCNC: 32 U/L (ref 13–56)
ANION GAP SERPL CALC-SCNC: 10 MMOL/L (ref 3–18)
AST SERPL-CCNC: 19 U/L (ref 10–38)
ATRIAL RATE: 119 BPM
BASOPHILS # BLD: 0 K/UL (ref 0–0.1)
BASOPHILS NFR BLD: 0 % (ref 0–2)
BILIRUB SERPL-MCNC: 0.6 MG/DL (ref 0.2–1)
BNP SERPL-MCNC: 14 PG/ML (ref 0–450)
BUN SERPL-MCNC: 11 MG/DL (ref 7–18)
BUN/CREAT SERPL: 14 (ref 12–20)
CALCIUM SERPL-MCNC: 9.1 MG/DL (ref 8.5–10.1)
CALCULATED P AXIS, ECG09: 47 DEGREES
CALCULATED R AXIS, ECG10: 87 DEGREES
CALCULATED T AXIS, ECG11: 12 DEGREES
CHLORIDE SERPL-SCNC: 101 MMOL/L (ref 100–111)
CO2 SERPL-SCNC: 24 MMOL/L (ref 21–32)
COVID-19 RAPID TEST, COVR: NOT DETECTED
CREAT SERPL-MCNC: 0.76 MG/DL (ref 0.6–1.3)
DIAGNOSIS, 93000: NORMAL
DIFFERENTIAL METHOD BLD: ABNORMAL
EOSINOPHIL # BLD: 0.2 K/UL (ref 0–0.4)
EOSINOPHIL NFR BLD: 2 % (ref 0–5)
ERYTHROCYTE [DISTWIDTH] IN BLOOD BY AUTOMATED COUNT: 13.1 % (ref 11.6–14.5)
GLOBULIN SER CALC-MCNC: 5.1 G/DL (ref 2–4)
GLUCOSE SERPL-MCNC: 110 MG/DL (ref 74–99)
HCT VFR BLD AUTO: 37.5 % (ref 35–45)
HGB BLD-MCNC: 12 G/DL (ref 12–16)
LACTATE BLD-SCNC: 1.12 MMOL/L (ref 0.4–2)
LYMPHOCYTES # BLD: 2.8 K/UL (ref 0.9–3.6)
LYMPHOCYTES NFR BLD: 27 % (ref 21–52)
MCH RBC QN AUTO: 26.8 PG (ref 24–34)
MCHC RBC AUTO-ENTMCNC: 32 G/DL (ref 31–37)
MCV RBC AUTO: 83.7 FL (ref 74–97)
MONOCYTES # BLD: 0.7 K/UL (ref 0.05–1.2)
MONOCYTES NFR BLD: 6 % (ref 3–10)
NEUTS SEG # BLD: 6.9 K/UL (ref 1.8–8)
NEUTS SEG NFR BLD: 65 % (ref 40–73)
P-R INTERVAL, ECG05: 120 MS
PLATELET # BLD AUTO: 595 K/UL (ref 135–420)
PMV BLD AUTO: 8.5 FL (ref 9.2–11.8)
POTASSIUM SERPL-SCNC: 4.1 MMOL/L (ref 3.5–5.5)
PROT SERPL-MCNC: 8.9 G/DL (ref 6.4–8.2)
Q-T INTERVAL, ECG07: 338 MS
QRS DURATION, ECG06: 80 MS
QTC CALCULATION (BEZET), ECG08: 475 MS
RBC # BLD AUTO: 4.48 M/UL (ref 4.2–5.3)
SARS-COV-2, COV2: NORMAL
SODIUM SERPL-SCNC: 135 MMOL/L (ref 136–145)
SOURCE, COVRS: NORMAL
TROPONIN I SERPL-MCNC: <0.02 NG/ML (ref 0–0.04)
VENTRICULAR RATE, ECG03: 119 BPM
WBC # BLD AUTO: 10.7 K/UL (ref 4.6–13.2)

## 2021-07-01 PROCEDURE — 83880 ASSAY OF NATRIURETIC PEPTIDE: CPT

## 2021-07-01 PROCEDURE — 71046 X-RAY EXAM CHEST 2 VIEWS: CPT

## 2021-07-01 PROCEDURE — 87635 SARS-COV-2 COVID-19 AMP PRB: CPT

## 2021-07-01 PROCEDURE — 87040 BLOOD CULTURE FOR BACTERIA: CPT

## 2021-07-01 PROCEDURE — 84484 ASSAY OF TROPONIN QUANT: CPT

## 2021-07-01 PROCEDURE — 83605 ASSAY OF LACTIC ACID: CPT

## 2021-07-01 PROCEDURE — 93005 ELECTROCARDIOGRAM TRACING: CPT

## 2021-07-01 PROCEDURE — 99284 EMERGENCY DEPT VISIT MOD MDM: CPT

## 2021-07-01 PROCEDURE — 80053 COMPREHEN METABOLIC PANEL: CPT

## 2021-07-01 PROCEDURE — 85025 COMPLETE CBC W/AUTO DIFF WBC: CPT

## 2021-07-01 RX ORDER — ALBUTEROL SULFATE 90 UG/1
2 AEROSOL, METERED RESPIRATORY (INHALATION)
Qty: 1 INHALER | Refills: 0 | Status: SHIPPED | OUTPATIENT
Start: 2021-07-01 | End: 2021-07-20 | Stop reason: ALTCHOICE

## 2021-07-01 RX ORDER — SODIUM CHLORIDE 0.9 % (FLUSH) 0.9 %
5-10 SYRINGE (ML) INJECTION AS NEEDED
Status: DISCONTINUED | OUTPATIENT
Start: 2021-07-01 | End: 2021-07-01 | Stop reason: HOSPADM

## 2021-07-01 NOTE — ED NOTES
Client refusing IV establishement and Fluids. Givens permission for lab draw and Covid swab. Client not able to stay for treament due to childcare. Voiced that she would return if needed. Provider, Racquel Rosado MD made aware.

## 2021-07-01 NOTE — ED PROVIDER NOTES
EMERGENCY DEPARTMENT HISTORY AND PHYSICAL EXAM    8:22 AM patient seen at this time in room 11      Date: 7/1/2021  Patient Name: Lore Bowden    History of Presenting Illness     Chief Complaint   Patient presents with    Fatigue    Shortness of Breath         History Provided By: patient    Additional History (Context): Lore Bowden is a 31 y.o. female presents with recently discharged for urosepsis having 2 stents placed, has lethargy some vague back pain but not as bad as when she came into the hospital.  She is concerned that she is going back into sepsis. Also has some chest congestion and is concerned she has bronchitis. Other than obesity no contributory medical history. Pain is mild currently. States she feels weak in her arms and legs    PCP: Yuni Chowdary NP    Chief Complaint:   Duration:    Timing:    Location:   Quality:   Severity:   Modifying Factors:   Associated Symptoms:       Current Facility-Administered Medications   Medication Dose Route Frequency Provider Last Rate Last Admin    sodium chloride (NS) flush 5-10 mL  5-10 mL IntraVENous PRN Maddie Granger MD        sodium chloride 0.9 % bolus infusion 1,000 mL  1,000 mL IntraVENous ONCE Darrell Pond MD         Current Outpatient Medications   Medication Sig Dispense Refill    fluticasone propionate (FLONASE) 50 mcg/actuation nasal spray 2 Sprays by Both Nostrils route daily. Indications: inflammation of the nose due to an allergy (Patient not taking: Reported on 6/29/2021) 1 Bottle 0    L. acidophilus,casei,rhamnosus (BIO-K PLUS) 50 billion cell cpDR capsule Take 1 Capsule by mouth daily. (Patient not taking: Reported on 6/29/2021) 14 Capsule 0    montelukast (SINGULAIR) 10 mg tablet Take 1 Tablet by mouth daily. Indications: seasonal runny nose (Patient not taking: Reported on 6/29/2021) 30 Tablet 0    levoFLOXacin (LEVAQUIN) 750 mg tablet Take 1 Tablet by mouth daily for 10 days.  Indications: bacterial urinary tract infection 10 Tablet 0    linezolid (ZYVOX) 600 mg tablet Take 1 Tablet by mouth two (2) times a day. 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 30 Tablet 1    acetaminophen (TYLENOL) 325 mg tablet Take 2 Tabs by mouth every four (4) hours as needed for Pain. (Patient not taking: Reported on 2021) 20 Tab 0       Past History     Past Medical History:  Past Medical History:   Diagnosis Date    Anxiety     Gestational diabetes     History of PCOS        Past Surgical History:  Past Surgical History:   Procedure Laterality Date    HX  SECTION  , ,     HX UROLOGICAL  2021    CYSTOSCOPY,LEFT URETEROSCOPY, LASER,LEFT STENT EXCHANGE retrograde pyleogram; Dr. Adrian Montoya       Family History:  Family History   Problem Relation Age of Onset    Cancer Paternal Grandfather         lung- smoker    Cancer Paternal Grandmother         unknown    Cancer Mother         breast- unknown age       Social History:  Social History     Tobacco Use    Smoking status: Never Smoker    Smokeless tobacco: Never Used   Substance Use Topics    Alcohol use: Yes     Comment: rarely    Drug use: No       Allergies: Allergies   Allergen Reactions    Latex, Natural Rubber Itching    Vicodin [Hydrocodone-Acetaminophen] Anxiety         Review of Systems     Review of Systems   Constitutional: Positive for fatigue. Negative for diaphoresis and fever. HENT: Negative for congestion and sore throat. Eyes: Negative for pain and itching. Respiratory: Positive for cough and shortness of breath. Cardiovascular: Negative for chest pain and palpitations. Gastrointestinal: Negative for abdominal pain and diarrhea. Endocrine: Negative for polydipsia and polyuria. Genitourinary: Negative for dysuria and hematuria.    Musculoskeletal: Negative for arthralgias and myalgias. Skin: Negative for rash and wound. Neurological: Negative for seizures and syncope. Hematological: Does not bruise/bleed easily. Psychiatric/Behavioral: Negative for agitation and hallucinations. Physical Exam       Patient Vitals for the past 12 hrs:   Temp Pulse Resp BP SpO2   07/01/21 0607 98.1 °F (36.7 °C) (!) 123 18 131/85 96 %       Physical Exam  Vitals and nursing note reviewed. Constitutional:       Appearance: She is well-developed. HENT:      Head: Normocephalic and atraumatic. Eyes:      General: No scleral icterus. Conjunctiva/sclera: Conjunctivae normal.   Neck:      Vascular: No JVD. Cardiovascular:      Rate and Rhythm: Normal rate and regular rhythm. Heart sounds: Normal heart sounds. Comments: 4 intact extremity pulses  Pulmonary:      Effort: Pulmonary effort is normal.      Breath sounds: Rhonchi present. Abdominal:      Palpations: Abdomen is soft. There is no mass. Tenderness: There is no abdominal tenderness. Musculoskeletal:         General: Normal range of motion. Cervical back: Normal range of motion and neck supple. Lymphadenopathy:      Cervical: No cervical adenopathy. Skin:     General: Skin is warm and dry. Neurological:      General: No focal deficit present. Mental Status: She is alert.            Diagnostic Study Results   Labs -  Recent Results (from the past 12 hour(s))   EKG, 12 LEAD, INITIAL    Collection Time: 07/01/21  6:17 AM   Result Value Ref Range    Ventricular Rate 119 BPM    Atrial Rate 119 BPM    P-R Interval 120 ms    QRS Duration 80 ms    Q-T Interval 338 ms    QTC Calculation (Bezet) 475 ms    Calculated P Axis 47 degrees    Calculated R Axis 87 degrees    Calculated T Axis 12 degrees    Diagnosis       Sinus tachycardia  Otherwise normal ECG  When compared with ECG of 20-JUN-2021 17:24,  Questionable change in QRS duration         Radiologic Studies -   XR CHEST PA LAT   Final Result   No radiographic evidence of acute cardiopulmonary process. XR CHEST PA LAT    Result Date: 7/1/2021  EXAM: XR CHEST PA LAT INDICATION: 30 years Female. chest congestion. ADDITIONAL HISTORY: None. TECHNIQUE: Frontal and lateral views of the chest. COMPARISON: Chest radiograph 6/23/2021, reference CT abdomen/pelvis 6/24/2021 FINDINGS: The cardiac silhouette is within normal limits in size. Pulmonary vasculature appears within normal limits. No confluent airspace opacity is appreciated. No definite radiographic correlate appreciated to the patchy opacity in the right lower lobe seen on prior CT. No evidence of pleural effusion or pneumothorax. No acute osseous abnormality appreciated. No radiographic evidence of acute cardiopulmonary process. Medications ordered:   Medications   sodium chloride (NS) flush 5-10 mL (has no administration in time range)   sodium chloride 0.9 % bolus infusion 1,000 mL (has no administration in time range)         Medical Decision Making   Initial Medical Decision Making and DDx:  So evaluate for pneumonia, chest x-ray is unremarkable. She is tachycardic    EKG sinus tachycardia at 119 no acute ischemic process    Consider recurrence of urosepsis, coronavirus. She has marked tachycardia so we will get cultures. Do not suspect PE. I did order troponin and BNP possibly postinfectious myocarditis although unlikely    Patient tells the nurse she does not want fluids she only wanted to find out if she has bronchitis. Does not what she told me she is now refusing fluids. Will treat for bronchitis. No pneumonia    ED Course: Progress Notes, Reevaluation, and Consults:         I am the first provider for this patient. I reviewed the vital signs, available nursing notes, past medical history, past surgical history, family history and social history.     Patient Vitals for the past 12 hrs:   Temp Pulse Resp BP SpO2   07/01/21 0607 98.1 °F (36.7 °C) (!) 123 18 131/85 96 % Vital Signs-Reviewed the patient's vital signs. Pulse Oximetry Analysis, Cardiac Monitor, 12 lead ekg: No hypoxia on room air  Interpreted by the EP. Records Reviewed: Nursing notes reviewed (Time of Review: 8:22 AM)    Procedures:   Critical Care Time:   Aspirin: (was aspirin given for stroke?)    Diagnosis     Clinical Impression:   1. Chronic bronchitis, unspecified chronic bronchitis type ([address]        Disposition:       Follow-up Information     Follow up With Specialties Details Why Contact Info    Juan Helton NP Nurse Practitioner In 2 days  [address] [place] [address] [id number] [facility]             Discharge Medication List as of 7/1/2021 10:00 AM      START taking these medications    Details   albuterol (PROVENTIL HFA, VENTOLIN HFA, PROAIR HFA) 90 mcg/actuation inhaler Take 2 Puffs by inhalation every four (4) hours as needed for Wheezing., Normal, Disp-1 Inhaler, R-0         CONTINUE these medications which have NOT CHANGED    Details   fluticasone propionate (FLONASE) 50 mcg/actuation nasal spray 2 Sprays by Both Nostrils route daily. Indications: inflammation of the nose due to an allergy, Normal, Disp-1 Bottle, R-0      L. acidophilus,casei,rhamnosus (BIO-K PLUS) 50 billion cell cpDR capsule Take 1 Capsule by mouth daily. , Normal, Disp-14 Capsule, R-0      montelukast (SINGULAIR) 10 mg tablet Take 1 Tablet by mouth daily. Indications: seasonal runny nose, Normal, Disp-30 Tablet, R-0      levoFLOXacin (LEVAQUIN) 750 mg tablet Take 1 Tablet by mouth daily for 10 days. Indications: bacterial urinary tract infection, Normal, Disp-10 Tablet, R-0      linezolid (ZYVOX) 600 mg tablet Take 1 Tablet by mouth two (2) times a day. 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 , Normal, Disp-30 Tablet, R-1      acetaminophen (TYLENOL) 325 mg tablet Take 2 Tabs by mouth every four (4) hours as needed for Pain., Normal, Disp-20 Tab, R-0           _______________________________    Notes:    Hank Deras MD using Dragon dictation      _______________________________

## 2021-07-01 NOTE — ED TRIAGE NOTES
Patient A/O x 4, presented to the ED with complaint of generalized weakness, chest congestion, chest tightness x yesterday. Patient denies chest pain, N/V, abdominal pain.

## 2021-07-07 ENCOUNTER — OFFICE VISIT (OUTPATIENT)
Dept: FAMILY MEDICINE CLINIC | Age: 31
End: 2021-07-07
Payer: MEDICAID

## 2021-07-07 VITALS
TEMPERATURE: 98.4 F | SYSTOLIC BLOOD PRESSURE: 138 MMHG | WEIGHT: 238 LBS | HEIGHT: 62 IN | BODY MASS INDEX: 43.79 KG/M2 | RESPIRATION RATE: 16 BRPM | OXYGEN SATURATION: 97 % | HEART RATE: 102 BPM | DIASTOLIC BLOOD PRESSURE: 100 MMHG

## 2021-07-07 DIAGNOSIS — N20.0 KIDNEY STONES: ICD-10-CM

## 2021-07-07 DIAGNOSIS — R03.0 ELEVATED BLOOD PRESSURE READING WITHOUT DIAGNOSIS OF HYPERTENSION: ICD-10-CM

## 2021-07-07 DIAGNOSIS — Z09 HOSPITAL DISCHARGE FOLLOW-UP: ICD-10-CM

## 2021-07-07 DIAGNOSIS — Z13.6 ENCOUNTER FOR LIPID SCREENING FOR CARDIOVASCULAR DISEASE: Primary | ICD-10-CM

## 2021-07-07 DIAGNOSIS — Z13.220 ENCOUNTER FOR LIPID SCREENING FOR CARDIOVASCULAR DISEASE: Primary | ICD-10-CM

## 2021-07-07 PROCEDURE — 99203 OFFICE O/P NEW LOW 30 MIN: CPT | Performed by: NURSE PRACTITIONER

## 2021-07-07 NOTE — PROGRESS NOTES
Braxton Sousa is a 31 y.o. female presenting today for New Patient ([facility]) and Hospital Follow Up  . Chief Complaint   Patient presents with    New Patient     Major Hospital Follow Up       HPI:  Braxton Sousa presents to the office today for hospital follow-up care. She had two separate admissions in May, 2021 and again in June, 2021 both secondary to a kidney stone, acute uncomplicated cystitis and sepsis. Her last admission was June 20-27. She was discharged home with antibiotics for 10 days. She was also instructed to follow-up with urology. She presents without any complaints of chest pain, palpitation, lower extremity edema. She does present with elevated BP reading of 138/100. She was admitted to the hospital for kidney stone. Currently a patient of [facility] and her next follow-up is July 30, 2021    ROS    ROS:  History obtained from the patient intake forms which are reviewed with the patient  · General: negative for - chills, fever, weight changes or malaise  · HEENT: no sore throat, nasal congestion, vision problems or ear problems  · Respiratory: no cough, shortness of breath, or wheezing  · Cardiovascular: no chest pain, palpitations, or dyspnea on exertion  · Gastrointestinal: no abdominal pain, N/V, change in bowel habits, or black or bloody stools  · Musculoskeletal: no back pain, joint pain, joint stiffness, muscle pain or muscle weakness  · Neurological: no numbness, tingling, headache or dizziness  · Endo:  No polyuria or polydipsia. · : no hematuria, dysuria, frequency, hesitancy, or nocturia.     · Psychological: negative for - anxiety, depression, sleep disturbances, suicidal or homicidal ideations    Allergies   Allergen Reactions    Latex, Natural Rubber Itching    Vicodin [Hydrocodone-Acetaminophen] Anxiety       PHQ Screening   3 most recent PHQ Screens 7/7/2021   Little interest or pleasure in doing things Not at all   Feeling down, depressed, irritable, or hopeless Not at all   Total Score PHQ 2 0       History  Past Medical History:   Diagnosis Date    Anxiety     Gestational diabetes     History of PCOS        Past Surgical History:   Procedure Laterality Date    HX  SECTION  , ,     HX UROLOGICAL  2021    CYSTOSCOPY,LEFT URETEROSCOPY, LASER,LEFT STENT EXCHANGE retrograde pyleogram; Dr. Tyra Ruiz History     Socioeconomic History    Marital status: SINGLE     Spouse name: Not on file    Number of children: Not on file    Years of education: Not on file    Highest education level: Not on file   Occupational History    Not on file   Tobacco Use    Smoking status: Never Smoker    Smokeless tobacco: Never Used   Substance and Sexual Activity    Alcohol use: Yes     Comment: rarely    Drug use: No    Sexual activity: Yes     Partners: Male     Birth control/protection: Condom   Other Topics Concern    Not on file   Social History Narrative    Not on file     Social Determinants of Health     Financial Resource Strain:     Difficulty of Paying Living Expenses:    Food Insecurity:     Worried About Running Out of Food in the Last Year:     [address] in the Last Year:    Transportation Needs:     Lack of Transportation (Medical):      Lack of Transportation (Non-Medical):    Physical Activity:     Days of Exercise per Week:     Minutes of Exercise per Session:    Stress:     Feeling of Stress :    Social Connections:     Frequency of Communication with Friends and Family:     Frequency of Social Gatherings with Friends and Family:     Attends [employer] Services:     Active Member of Clubs or Organizations:     Attends Club or Organization Meetings:     Marital Status:    Intimate Partner Violence:     Fear of Current or Ex-Partner:     Emotionally Abused:     Physically Abused:     Sexually Abused:        Current Outpatient Medications   Medication Sig Dispense Refill  linezolid (ZYVOX) 600 mg tablet Take 1 Tablet by mouth two (2) times a day. 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 1 Inhaler 0    fluticasone propionate (FLONASE) 50 mcg/actuation nasal spray 2 Sprays by Both Nostrils route daily. Indications: inflammation of the nose due to an allergy (Patient not taking: Reported on 6/29/2021) 1 Bottle 0    L. acidophilus,casei,rhamnosus (BIO-K PLUS) 50 billion cell cpDR capsule Take 1 Capsule by mouth daily. (Patient not taking: Reported on 6/29/2021) 14 Capsule 0    montelukast (SINGULAIR) 10 mg tablet Take 1 Tablet by mouth daily. Indications: seasonal runny nose (Patient not taking: Reported on 6/29/2021) 30 Tablet 0    oxybutynin chloride XL (DITROPAN XL) 10 mg CR tablet Take 1 Tablet by mouth daily. 30 Tablet 1    acetaminophen (TYLENOL) 325 mg tablet Take 2 Tabs by mouth every four (4) hours as needed for Pain. (Patient not taking: Reported on 6/29/2021) 20 Tab 0         Vitals:    07/07/21 1420 07/07/21 1505   BP: (!) 155/93 (!) 138/100   Pulse: (!) 102    Resp: 16    Temp: 98.4 °F (36.9 °C)    TempSrc: Oral    SpO2: 97%    Weight: 238 lb (108 kg)    Height: 5' 2\" (1.575 m)    PainSc:   3    LMP: 06/02/2021       Physical Exam  Vitals and nursing note reviewed. Constitutional:       Appearance: Normal appearance. HENT:      Head: Normocephalic. Cardiovascular:      Rate and Rhythm: Normal rate and regular rhythm. Pulses: Normal pulses. Heart sounds: Normal heart sounds. Pulmonary:      Effort: Pulmonary effort is normal.      Breath sounds: Normal breath sounds. Abdominal:      General: Bowel sounds are normal.      Palpations: Abdomen is soft. Skin:     General: Skin is warm and dry. Neurological:      General: No focal deficit present. Mental Status: She is alert. Admission on 07/01/2021, Discharged on 07/01/2021   Component Date Value Ref Range Status    Ventricular Rate 07/01/2021 119  BPM Final    Atrial Rate 07/01/2021 119  BPM Final    P-R Interval 07/01/2021 120  ms Final    QRS Duration 07/01/2021 80  ms Final    Q-T Interval 07/01/2021 338  ms Final    QTC Calculation (Bezet) 07/01/2021 475  ms Final    Calculated P Axis 07/01/2021 47  degrees Final    Calculated R Axis 07/01/2021 87  degrees Final    Calculated T Axis 07/01/2021 12  degrees Final    Diagnosis 07/01/2021    Final                    Value:Sinus tachycardia  Otherwise normal ECG  When compared with ECG of 20-JUN-2021 17:24,  Questionable change in QRS duration  Confirmed by Robinson Mcfadden MD, --- ([id number]) on 7/1/2021 10:26:44 AM      SARS-CoV-2 07/01/2021 Please find results under separate order    Final    Special Requests: 07/01/2021 NO SPECIAL REQUESTS    Final    Culture result: 07/01/2021 NO GROWTH 6 DAYS    Final    Special Requests: 07/01/2021 NO SPECIAL REQUESTS    Final    Culture result: 07/01/2021 NO GROWTH 6 DAYS    Final    Sodium 07/01/2021 135* 136 - 145 mmol/L Final    Potassium 07/01/2021 4.1  3.5 - 5.5 mmol/L Final    Chloride 07/01/2021 101  100 - 111 mmol/L Final    CO2 07/01/2021 24  21 - 32 mmol/L Final    Anion gap 07/01/2021 10  3.0 - 18 mmol/L Final    Glucose 07/01/2021 110* 74 - 99 mg/dL Final    BUN 07/01/2021 11  7.0 - 18 MG/DL Final    Creatinine 07/01/2021 0.76  0.6 - 1.3 MG/DL Final    BUN/Creatinine ratio 07/01/2021 14  12 - 20   Final    GFR est AA 07/01/2021 >60  >60 ml/min/1.73m2 Final    GFR est non-AA 07/01/2021 >60  >60 ml/min/1.73m2 Final    Comment: (NOTE)  Estimated GFR is calculated using the Modification of Diet in Renal   Disease (MDRD) Study equation, reported for both  Americans   (GFRAA) and non- Americans (GFRNA), and normalized to 1.73m2   body surface area.  The physician must decide which value applies to   the patient. The MDRD study equation should only be used in   individuals age 25 or older. It has not been validated for the   following: pregnant women, patients with serious comorbid conditions,   or on certain medications, or persons with extremes of body size,   muscle mass, or nutritional status.  Calcium 07/01/2021 9.1  8.5 - 10.1 MG/DL Final    Bilirubin, total 07/01/2021 0.6  0.2 - 1.0 MG/DL Final    ALT (SGPT) 07/01/2021 32  13 - 56 U/L Final    AST (SGOT) 07/01/2021 19  10 - 38 U/L Final    Alk. phosphatase 07/01/2021 56  45 - 117 U/L Final    Protein, total 07/01/2021 8.9* 6.4 - 8.2 g/dL Final    Albumin 07/01/2021 3.8  3.4 - 5.0 g/dL Final    Globulin 07/01/2021 5.1* 2.0 - 4.0 g/dL Final    A-G Ratio 07/01/2021 0.7* 0.8 - 1.7   Final    WBC 07/01/2021 10.7  4.6 - 13.2 K/uL Final    RBC 07/01/2021 4.48  4.20 - 5.30 M/uL Final    HGB 07/01/2021 12.0  12.0 - 16.0 g/dL Final    HCT 07/01/2021 37.5  35.0 - 45.0 % Final    MCV 07/01/2021 83.7  74.0 - 97.0 FL Final    MCH 07/01/2021 26.8  24.0 - 34.0 PG Final    MCHC 07/01/2021 32.0  31.0 - 37.0 g/dL Final    RDW 07/01/2021 13.1  11.6 - 14.5 % Final    PLATELET 76/05/8924 041* 135 - 420 K/uL Final    MPV 07/01/2021 8.5* 9.2 - 11.8 FL Final    NEUTROPHILS 07/01/2021 65  40 - 73 % Final    LYMPHOCYTES 07/01/2021 27  21 - 52 % Final    MONOCYTES 07/01/2021 6  3 - 10 % Final    EOSINOPHILS 07/01/2021 2  0 - 5 % Final    BASOPHILS 07/01/2021 0  0 - 2 % Final    ABS. NEUTROPHILS 07/01/2021 6.9  1.8 - 8.0 K/UL Final    ABS. LYMPHOCYTES 07/01/2021 2.8  0.9 - 3.6 K/UL Final    ABS. MONOCYTES 07/01/2021 0.7  0.05 - 1.2 K/UL Final    ABS. EOSINOPHILS 07/01/2021 0.2  0.0 - 0.4 K/UL Final    ABS.  BASOPHILS 07/01/2021 0.0  0.0 - 0.1 K/UL Final    DF 07/01/2021 AUTOMATED    Final    Troponin-I, QT 07/01/2021 <0.02  0.0 - 0.045 NG/ML Final    Comment: The presence of detectable troponin above the reference range indicates myocardial injury which may be due to ischemia, myocarditis, trauma, etc.  Clinical correlation is necessary to establish the significance of this finding. Sequential testing is recommended to determine if the typical rise and fall of cTnI is demonstrated. Note:  Cardiac troponin I has a relatively long half life and may be present well after the CK MB has returned to baseline. The reference range is based on the 99th percentile of the referent population.  NT pro-BNP 07/01/2021 14  0 - 450 PG/ML Final    Comment:           For patients with dyspnea, NT proBNP is highly sensitive for detecting acute congestive heart failure. Also, an NT proBNP <300 pg/mL effectively rules out acute congestive heart failure, with 99% negative predictive value. Our reference ranges are for acute dyspnea. Age              Range (pg/ml)        0-49                0-450        50-75               0-900        >75                 0-1800       For ambulatory office patients, the ranges below apply: For patients with dyspnea, NT proBNP is highly sensitive for detecting acute congestive heart failure. Also, an NT proBNP <300 pg/mL effectively rules out acute congestive heart failure, with 99% negative predictive value. Our reference ranges are for acute dyspnea.  Lactic Acid (POC) 07/01/2021 1.12  0.40 - 2.00 mmol/L Final    Specimen source 07/01/2021 Nasopharyngeal    Final    COVID-19 rapid test 07/01/2021 Not detected  NOTD   Final    Comment: Rapid Abbott ID Now       Rapid NAAT:  The specimen is NEGATIVE for SARS-CoV-2, the novel coronavirus associated with COVID-19. Negative results should be treated as presumptive and, if inconsistent with clinical signs and symptoms or necessary for patient management, should be tested with an alternative molecular assay. Negative results do not preclude SARS-CoV-2 infection and should not be used as the sole basis for patient management decisions.        This test has been authorized by the FDA under an Emergency Use Authorization (EUA) for use by authorized laboratories. Fact sheet for Healthcare Providers: ConventionUpdate.co.nz  Fact sheet for Patients: ConventionUpdate.co.nz       Methodology: Isothermal Nucleic Acid Amplification     Office Visit on 06/29/2021   Component Date Value Ref Range Status    Color (UA POC) 06/29/2021 Yellow   Final    Clarity (UA POC) 06/29/2021 Clear   Final    Glucose (UA POC) 06/29/2021 Negative  Negative Final    Bilirubin (UA POC) 06/29/2021 Negative  Negative Final    Ketones (UA POC) 06/29/2021 Negative  Negative Final    Specific gravity (UA POC) 06/29/2021 1.025  1.001 - 1.035 Final    Blood (UA POC) 06/29/2021 Trace  Negative Final    pH (UA POC) 06/29/2021 7.0  4.6 - 8.0 Final    Protein (UA POC) 06/29/2021 Negative  Negative Final    Urobilinogen (UA POC) 06/29/2021 0.2 mg/dL  0.2 - 1 Final    Nitrites (UA POC) 06/29/2021 Negative  Negative Final    Leukocyte esterase (UA POC) 06/29/2021 Negative  Negative Final   No results displayed because visit has over 200 results. Hospital Outpatient Visit on 06/16/2021   Component Date Value Ref Range Status    Component 1 06/16/2021 SEE NOTE    Final    Comment:   Calcium Oxalate Dihydrate ([place]) 40%    Calcium Oxalate Monohydrate ([place]) 40%    Carbonate Apatite ([place]) 20%    This test was developed and its analytical performance    characteristics have been determined by [facility] Davey. It has not been cleared or approved by the    FDA. This assay has been validated pursuant to the CLIA    regulations and is used for clinical purposes.       Component 2 06/16/2021 N/A    Final    Comment: Test performed by [facility]                    [address]                    Phone: [phone]  Medical Director: Renea Ramirez M.D.  Stone weight 06/16/2021 0.006  g Final    Comment: Test Reported by Dae Espitia  [facility],  [address] [place] [address], [facility], [address] M.D., Ph.D., Director of Laboratories  [phone], [facility] [id number]     Clinical Support on 06/08/2021   Component Date Value Ref Range Status    FINAL REPORT 06/08/2021 Microbiology results   Final    Comment: ANTIBIOTIC ALLERGIES  No Known Antibiotic Allergies    URINE SOURCE:  Straight Cath In/Out    COLONY COUNT / RESULT:   No Growth at 48 Hours     Office Visit on 06/02/2021   Component Date Value Ref Range Status    Urine Culture, Routine 06/02/2021    Final                    Value:Mixed urogenital aayush  10,000-25,000 colony forming units per mL         No results found for any visits on 07/07/21. Patient Care Team:  Patient Care Team:  Sharif Riojas NP as PCP - General (Nurse Practitioner)  Sharif Riojas NP as PCP - [facility] Empaneled Provider  Gopi Moore NP as Nurse Practitioner (Gastroenterology)      Assessment / Plan:      ICD-10-CM ICD-9-CM    1. Encounter for lipid screening for cardiovascular disease  Z13.220 V77.91 CBC WITH AUTOMATED DIFF    Y02.5 P29.4 METABOLIC PANEL, COMPREHENSIVE      LIPID PANEL   2. Hospital discharge follow-up  Z09 V67.59    3. Kidney stones  N20.0 592.0    4. Elevated blood pressure reading without diagnosis of hypertension  R03.0 796.2        Callus kidney calculus kidney  Follow-up and Dispositions    · Return in about 4 weeks (around 8/4/2021). I asked the patient if she  had any questions and answered her  questions. The patient stated that she understands the treatment plan and agrees with the treatment plan    This document was created with a voice activated dictation system and may contain transcription errors.

## 2021-07-07 NOTE — PROGRESS NOTES
Dennis Petersen presents today for   Chief Complaint   Patient presents with    New Patient     establish [facility] Follow Up       Is someone accompanying this pt? no    Is the patient using any DME equipment during OV? no    Depression Screening:  3 most recent PHQ Screens 7/7/2021   Little interest or pleasure in doing things Not at all   Feeling down, depressed, irritable, or hopeless Not at all   Total Score PHQ 2 0       Learning Assessment:  Learning Assessment 7/7/2021   PRIMARY LEARNER Patient   HIGHEST LEVEL OF EDUCATION - PRIMARY LEARNER  GRADUATED HIGH SCHOOL OR GED   BARRIERS PRIMARY LEARNER NONE   CO-LEARNER CAREGIVER -   PRIMARY LANGUAGE ENGLISH    NEED -   LEARNER PREFERENCE PRIMARY LISTENING   LEARNING SPECIAL TOPICS -   ANSWERED BY patient   Jose Camacho Maintenance reviewed and discussed and ordered per Provider. Health Maintenance Due   Topic Date Due    PAP AKA CERVICAL CYTOLOGY  01/01/2016   . Coordination of Care:  1. Have you been to the ER, urgent care clinic since your last visit? Hospitalized since your last visit? yes    2. Have you seen or consulted any other health care providers outside of the [address] [place] [address] since your last visit? Include any pap smears or colon screening.  no

## 2021-07-20 ENCOUNTER — OFFICE VISIT (OUTPATIENT)
Dept: FAMILY MEDICINE CLINIC | Age: 31
End: 2021-07-20
Payer: MEDICAID

## 2021-07-20 VITALS
BODY MASS INDEX: 44.35 KG/M2 | HEIGHT: 62 IN | OXYGEN SATURATION: 97 % | TEMPERATURE: 97.5 F | HEART RATE: 114 BPM | SYSTOLIC BLOOD PRESSURE: 138 MMHG | DIASTOLIC BLOOD PRESSURE: 78 MMHG | WEIGHT: 241 LBS | RESPIRATION RATE: 16 BRPM

## 2021-07-20 DIAGNOSIS — J06.9 UPPER RESPIRATORY TRACT INFECTION, UNSPECIFIED TYPE: Primary | ICD-10-CM

## 2021-07-20 DIAGNOSIS — H67.2 OTITIS MEDIA OF LEFT EAR IN DISEASE CLASSIFIED ELSEWHERE: ICD-10-CM

## 2021-07-20 PROCEDURE — 99213 OFFICE O/P EST LOW 20 MIN: CPT | Performed by: NURSE PRACTITIONER

## 2021-07-20 RX ORDER — ACETAMINOPHEN 325 MG/1
325 TABLET ORAL
COMMUNITY
End: 2021-07-29

## 2021-07-20 RX ORDER — BISMUTH SUBSALICYLATE 262 MG
1 TABLET,CHEWABLE ORAL DAILY
COMMUNITY

## 2021-07-20 RX ORDER — CHOLECALCIFEROL (VITAMIN D3) 50 MCG
CAPSULE ORAL
COMMUNITY
End: 2021-09-27

## 2021-07-20 RX ORDER — AMOXICILLIN AND CLAVULANATE POTASSIUM 875; 125 MG/1; MG/1
1 TABLET, FILM COATED ORAL 2 TIMES DAILY
Qty: 14 TABLET | Refills: 0 | Status: SHIPPED | OUTPATIENT
Start: 2021-07-20 | End: 2021-07-27

## 2021-07-20 NOTE — PROGRESS NOTES
Alba Butler presents today for   Chief Complaint   Patient presents with    Ear Pain    Ear Fullness       Is someone accompanying this pt? no    Is the patient using any DME equipment during OV? No    Depression Screening:  3 most recent PHQ Screens 7/20/2021   Little interest or pleasure in doing things Not at all   Feeling down, depressed, irritable, or hopeless Not at all   Total Score PHQ 2 0       Learning Assessment:  Learning Assessment 7/7/2021   PRIMARY LEARNER Patient   HIGHEST LEVEL OF EDUCATION - PRIMARY LEARNER  GRADUATED HIGH SCHOOL OR GED   BARRIERS PRIMARY LEARNER NONE   CO-LEARNER CAREGIVER -   PRIMARY LANGUAGE ENGLISH    NEED -   LEARNER PREFERENCE PRIMARY LISTENING   LEARNING SPECIAL TOPICS -   ANSWERED BY patient   RELATIONSHIP SELF       Health Maintenance reviewed and discussed and ordered per Provider. Health Maintenance Due   Topic Date Due    PAP AKA CERVICAL CYTOLOGY  01/01/2016   . Coordination of Care:  1. Have you been to the ER, urgent care clinic since your last visit? Hospitalized since your last visit? no    2. Have you seen or consulted any other health care providers outside of the [address] [place] [address] since your last visit? Include any pap smears or colon screening.  no

## 2021-07-20 NOTE — PROGRESS NOTES
Arsen Reed is a 31 y.o. female presenting today for Ear Pain and Ear Fullness  . Chief Complaint   Patient presents with    Ear Pain    Ear Fullness       HPI:  Arsen Reed presents to the office today for left ear pain and sinus pressure x 1 week. She is complaining of sinus pressure and sinus pain. She also notes that she has pressure in the left ear with decreased hearing. She denies any fever, loss of smell or taste, GI upset, cough or dyspnea. ROS    ROS:  History obtained from the patient intake forms which are reviewed with the patient  · General: negative for - chills, fever, weight changes or malaise  · HEENT: left ear pain,  sinus pressure, nasal congestion  · Respiratory: no cough, shortness of breath, or wheezing  · Cardiovascular: no chest pain, palpitations, or dyspnea on exertion  · Gastrointestinal: no abdominal pain, N/V, change in bowel habits, or black or bloody stools  · Musculoskeletal: no back pain, joint pain, joint stiffness, muscle pain or muscle weakness  · Neurological: no numbness, tingling, headache or dizziness  · Endo:  No polyuria or polydipsia. · : no hematuria, dysuria, frequency, hesitancy, or nocturia.     · Psychological: negative for - anxiety, depression, sleep disturbances, suicidal or homicidal ideations    Allergies   Allergen Reactions    Latex, Natural Rubber Itching    Vicodin [Hydrocodone-Acetaminophen] Anxiety       PHQ Screening   3 most recent PHQ Screens 2021   Little interest or pleasure in doing things Not at all   Feeling down, depressed, irritable, or hopeless Not at all   Total Score PHQ 2 0       History  Past Medical History:   Diagnosis Date    Anxiety     Gestational diabetes     History of PCOS        Past Surgical History:   Procedure Laterality Date    HX  SECTION  , ,     HX UROLOGICAL  2021    CYSTOSCOPY,LEFT URETEROSCOPY, LASER,LEFT STENT EXCHANGE retrograde pyleogram; Dr. Nba Javier Social History     Socioeconomic History    Marital status: SINGLE     Spouse name: Not on file    Number of children: Not on file    Years of education: Not on file    Highest education level: Not on file   Occupational History    Not on file   Tobacco Use    Smoking status: Never Smoker    Smokeless tobacco: Never Used   Substance and Sexual Activity    Alcohol use: Yes     Comment: rarely    Drug use: No    Sexual activity: Yes     Partners: Male     Birth control/protection: Condom   Other Topics Concern    Not on file   Social History Narrative    Not on file     Social Determinants of Health     Financial Resource Strain:     Difficulty of Paying Living Expenses:    Food Insecurity:     Worried About Running Out of Food in the Last Year:     [address] in the Last Year:    Transportation Needs:     Lack of Transportation (Medical):  Lack of Transportation (Non-Medical):    Physical Activity:     Days of Exercise per Week:     Minutes of Exercise per Session:    Stress:     Feeling of Stress :    Social Connections:     Frequency of Communication with Friends and Family:     Frequency of Social Gatherings with Friends and Family:     Attends [employer] Services:     Active Member of Clubs or Organizations:     Attends Club or Organization Meetings:     Marital Status:    Intimate Partner Violence:     Fear of Current or Ex-Partner:     Emotionally Abused:     Physically Abused:     Sexually Abused:        Current Outpatient Medications   Medication Sig Dispense Refill    acetaminophen (TylenoL) 325 mg tablet Take 325 mg by mouth every four (4) hours as needed for Pain. 2 tabs      B.infantis-B.ani-B.long-B.bifi (Probiotic 4X) 10-15 mg TbEC Take  by mouth.  multivitamin (ONE A DAY) tablet Take 1 Tablet by mouth daily.  L.acid,gas,plan,rhm/B.ani/cran (UP4 PROBIOTICS WOMEN'S PO) Take  by mouth.  ibuprofen (MOTRIN PO) Take  by mouth.       amoxicillin-clavulanate (AUGMENTIN) 875-125 mg per tablet Take 1 Tablet by mouth two (2) times a day for 7 days. 14 Tablet 0    albuterol (PROVENTIL HFA, VENTOLIN HFA, PROAIR HFA) 90 mcg/actuation inhaler Take 2 Puffs by inhalation every four (4) hours as needed for Wheezing. 1 Inhaler 0    fluticasone propionate (FLONASE) 50 mcg/actuation nasal spray 2 Sprays by Both Nostrils route daily. Indications: inflammation of the nose due to an allergy (Patient not taking: Reported on 6/29/2021) 1 Bottle 0    L. acidophilus,casei,rhamnosus (BIO-K PLUS) 50 billion cell cpDR capsule Take 1 Capsule by mouth daily. (Patient not taking: Reported on 6/29/2021) 14 Capsule 0    linezolid (ZYVOX) 600 mg tablet Take 1 Tablet by mouth two (2) times a day. 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 30 Tablet 1    acetaminophen (TYLENOL) 325 mg tablet Take 2 Tabs by mouth every four (4) hours as needed for Pain. (Patient not taking: Reported on 6/29/2021) 20 Tab 0         Vitals:    07/20/21 1332 07/20/21 1349   BP: (!) 150/97 138/78   Pulse: (!) 114    Resp: 16    Temp: 97.5 °F (36.4 °C)    TempSrc: Oral    SpO2: 97%    Weight: 241 lb (109.3 kg)    Height: 5' 2\" (1.575 m)    PainSc:   6    LMP: 06/22/2021       Physical Exam  Vitals and nursing note reviewed. Constitutional:       Appearance: Normal appearance. HENT:      Left Ear: Tympanic membrane is erythematous. Cardiovascular:      Rate and Rhythm: Normal rate and regular rhythm. Pulses: Normal pulses. Heart sounds: Normal heart sounds. Pulmonary:      Effort: Pulmonary effort is normal.      Breath sounds: Normal breath sounds. Neurological:      Mental Status: She is alert.          Admission on 07/01/2021, Discharged on 07/01/2021   Component Date Value Ref Range Status    Ventricular Rate 07/01/2021 119  BPM Final    Atrial Rate 07/01/2021 119  BPM Final    P-R Interval 07/01/2021 120  ms Final    QRS Duration 07/01/2021 80  ms Final    Q-T Interval 07/01/2021 338  ms Final    QTC Calculation (Bezet) 07/01/2021 475  ms Final    Calculated P Axis 07/01/2021 47  degrees Final    Calculated R Axis 07/01/2021 87  degrees Final    Calculated T Axis 07/01/2021 12  degrees Final    Diagnosis 07/01/2021    Final                    Value:Sinus tachycardia  Otherwise normal ECG  When compared with ECG of 20-JUN-2021 17:24,  Questionable change in QRS duration  Confirmed by Roseanne Carlson MD, --- ([id number]) on 7/1/2021 10:26:44 AM      SARS-CoV-2 07/01/2021 Please find results under separate order    Final    Special Requests: 07/01/2021 NO SPECIAL REQUESTS    Final    Culture result: 07/01/2021 NO GROWTH 6 DAYS    Final    Special Requests: 07/01/2021 NO SPECIAL REQUESTS    Final    Culture result: 07/01/2021 NO GROWTH 6 DAYS    Final    Sodium 07/01/2021 135* 136 - 145 mmol/L Final    Potassium 07/01/2021 4.1  3.5 - 5.5 mmol/L Final    Chloride 07/01/2021 101  100 - 111 mmol/L Final    CO2 07/01/2021 24  21 - 32 mmol/L Final    Anion gap 07/01/2021 10  3.0 - 18 mmol/L Final    Glucose 07/01/2021 110* 74 - 99 mg/dL Final    BUN 07/01/2021 11  7.0 - 18 MG/DL Final    Creatinine 07/01/2021 0.76  0.6 - 1.3 MG/DL Final    BUN/Creatinine ratio 07/01/2021 14  12 - 20   Final    GFR est AA 07/01/2021 >60  >60 ml/min/1.73m2 Final    GFR est non-AA 07/01/2021 >60  >60 ml/min/1.73m2 Final    Comment: (NOTE)  Estimated GFR is calculated using the Modification of Diet in Renal   Disease (MDRD) Study equation, reported for both  Americans   (GFRAA) and non- Americans (GFRNA), and normalized to 1.73m2   body surface area. The physician must decide which value applies to   the patient. The MDRD study equation should only be used in   individuals age 25 or older.  It has not been validated for the   following: pregnant women, patients with serious comorbid conditions,   or on certain medications, or persons with extremes of body size,   muscle mass, or nutritional status.  Calcium 07/01/2021 9.1  8.5 - 10.1 MG/DL Final    Bilirubin, total 07/01/2021 0.6  0.2 - 1.0 MG/DL Final    ALT (SGPT) 07/01/2021 32  13 - 56 U/L Final    AST (SGOT) 07/01/2021 19  10 - 38 U/L Final    Alk. phosphatase 07/01/2021 56  45 - 117 U/L Final    Protein, total 07/01/2021 8.9* 6.4 - 8.2 g/dL Final    Albumin 07/01/2021 3.8  3.4 - 5.0 g/dL Final    Globulin 07/01/2021 5.1* 2.0 - 4.0 g/dL Final    A-G Ratio 07/01/2021 0.7* 0.8 - 1.7   Final    WBC 07/01/2021 10.7  4.6 - 13.2 K/uL Final    RBC 07/01/2021 4.48  4.20 - 5.30 M/uL Final    HGB 07/01/2021 12.0  12.0 - 16.0 g/dL Final    HCT 07/01/2021 37.5  35.0 - 45.0 % Final    MCV 07/01/2021 83.7  74.0 - 97.0 FL Final    MCH 07/01/2021 26.8  24.0 - 34.0 PG Final    MCHC 07/01/2021 32.0  31.0 - 37.0 g/dL Final    RDW 07/01/2021 13.1  11.6 - 14.5 % Final    PLATELET 16/54/2328 371* 135 - 420 K/uL Final    MPV 07/01/2021 8.5* 9.2 - 11.8 FL Final    NEUTROPHILS 07/01/2021 65  40 - 73 % Final    LYMPHOCYTES 07/01/2021 27  21 - 52 % Final    MONOCYTES 07/01/2021 6  3 - 10 % Final    EOSINOPHILS 07/01/2021 2  0 - 5 % Final    BASOPHILS 07/01/2021 0  0 - 2 % Final    ABS. NEUTROPHILS 07/01/2021 6.9  1.8 - 8.0 K/UL Final    ABS. LYMPHOCYTES 07/01/2021 2.8  0.9 - 3.6 K/UL Final    ABS. MONOCYTES 07/01/2021 0.7  0.05 - 1.2 K/UL Final    ABS. EOSINOPHILS 07/01/2021 0.2  0.0 - 0.4 K/UL Final    ABS. BASOPHILS 07/01/2021 0.0  0.0 - 0.1 K/UL Final    DF 07/01/2021 AUTOMATED    Final    Troponin-I, QT 07/01/2021 <0.02  0.0 - 0.045 NG/ML Final    Comment: The presence of detectable troponin above the reference range indicates myocardial injury which may be due to ischemia, myocarditis, trauma, etc.  Clinical correlation is necessary to establish the significance of this finding.   Sequential testing is recommended to determine if the typical rise and fall of cTnI is demonstrated. Note:  Cardiac troponin I has a relatively long half life and may be present well after the CK MB has returned to baseline. The reference range is based on the 99th percentile of the referent population.  NT pro-BNP 07/01/2021 14  0 - 450 PG/ML Final    Comment:           For patients with dyspnea, NT proBNP is highly sensitive for detecting acute congestive heart failure. Also, an NT proBNP <300 pg/mL effectively rules out acute congestive heart failure, with 99% negative predictive value. Our reference ranges are for acute dyspnea. Age              Range (pg/ml)        0-49                0-450        50-75               0-900        >75                 0-1800       For ambulatory office patients, the ranges below apply: For patients with dyspnea, NT proBNP is highly sensitive for detecting acute congestive heart failure. Also, an NT proBNP <300 pg/mL effectively rules out acute congestive heart failure, with 99% negative predictive value. Our reference ranges are for acute dyspnea.  Lactic Acid (POC) 07/01/2021 1.12  0.40 - 2.00 mmol/L Final    Specimen source 07/01/2021 Nasopharyngeal    Final    COVID-19 rapid test 07/01/2021 Not detected  NOTD   Final    Comment: Rapid Abbott ID Now       Rapid NAAT:  The specimen is NEGATIVE for SARS-CoV-2, the novel coronavirus associated with COVID-19. Negative results should be treated as presumptive and, if inconsistent with clinical signs and symptoms or necessary for patient management, should be tested with an alternative molecular assay. Negative results do not preclude SARS-CoV-2 infection and should not be used as the sole basis for patient management decisions. This test has been authorized by the FDA under an Emergency Use Authorization (EUA) for use by authorized laboratories.    Fact sheet for Healthcare Providers: ConventionUpdate.co.nz  Fact sheet for Patients: ConventionUpdate.co.nz       Methodology: Isothermal Nucleic Acid Amplification     Office Visit on 06/29/2021   Component Date Value Ref Range Status    Color (UA POC) 06/29/2021 Yellow   Final    Clarity (UA POC) 06/29/2021 Clear   Final    Glucose (UA POC) 06/29/2021 Negative  Negative Final    Bilirubin (UA POC) 06/29/2021 Negative  Negative Final    Ketones (UA POC) 06/29/2021 Negative  Negative Final    Specific gravity (UA POC) 06/29/2021 1.025  1.001 - 1.035 Final    Blood (UA POC) 06/29/2021 Trace  Negative Final    pH (UA POC) 06/29/2021 7.0  4.6 - 8.0 Final    Protein (UA POC) 06/29/2021 Negative  Negative Final    Urobilinogen (UA POC) 06/29/2021 0.2 mg/dL  0.2 - 1 Final    Nitrites (UA POC) 06/29/2021 Negative  Negative Final    Leukocyte esterase (UA POC) 06/29/2021 Negative  Negative Final   No results displayed because visit has over 200 results. Hospital Outpatient Visit on 06/16/2021   Component Date Value Ref Range Status    Component 1 06/16/2021 SEE NOTE    Final    Comment:   Calcium Oxalate Dihydrate ([place]) 40%    Calcium Oxalate Monohydrate ([place]) 40%    Carbonate Apatite ([place]) 20%    This test was developed and its analytical performance    characteristics have been determined by [facility] Davey. It has not been cleared or approved by the    FDA. This assay has been validated pursuant to the CLIA    regulations and is used for clinical purposes.       Component 2 06/16/2021 N/A    Final    Comment: Test performed by [facility]                    [address] [facility] [address],                    [place], [address]                    Phone: [phone]  Medical Director: Sony Zavala M.D.     Massey Brick weight 06/16/2021 0.006  g Final    Comment: Test Reported by Valerie Vaca,  [facility],  [address] [place] [address], [place], [address] Juan M.D., Ph.D., Director of Laboratories  [phone], [facility] [id number]     Clinical Support on 06/08/2021   Component Date Value Ref Range Status    FINAL REPORT 06/08/2021 Microbiology results   Final    Comment: ANTIBIOTIC ALLERGIES  No Known Antibiotic Allergies    URINE SOURCE:  Straight Cath In/Out    COLONY COUNT / RESULT:   No Growth at 48 Hours     Office Visit on 06/02/2021   Component Date Value Ref Range Status    Urine Culture, Routine 06/02/2021    Final                    Value:Mixed urogenital aayush  10,000-25,000 colony forming units per mL         No results found for any visits on 07/20/21. Patient Care Team:  Patient Care Team:  Rashad Romeo NP as PCP - General (Nurse Practitioner)  Rashad Rome[facility] as [facility]  Cara Gray NP as Nurse Practitioner (Gastroenterology)      Assessment / Plan:      ICD-10-CM ICD-9-CM    1. Upper respiratory tract infection, unspecified type  J06.9 465.9 amoxicillin-clavulanate (AUGMENTIN) 875-125 mg per tablet   2. Otitis media of left ear in disease classified elsewhere  H67.2 382.02 amoxicillin-clavulanate (AUGMENTIN) 875-125 mg per tablet         Follow-up and Dispositions    · Return in about 4 months (around 11/20/2021). I asked the patient if she  had any questions and answered her  questions. The patient stated that she understands the treatment plan and agrees with the treatment plan    This document was created with a voice activated dictation system and may contain transcription errors.

## 2021-08-10 ENCOUNTER — OFFICE VISIT (OUTPATIENT)
Dept: FAMILY MEDICINE CLINIC | Age: 31
End: 2021-08-10
Payer: MEDICAID

## 2021-08-10 VITALS
OXYGEN SATURATION: 95 % | HEART RATE: 130 BPM | TEMPERATURE: 97.8 F | HEIGHT: 62 IN | BODY MASS INDEX: 44.9 KG/M2 | WEIGHT: 244 LBS | SYSTOLIC BLOOD PRESSURE: 135 MMHG | RESPIRATION RATE: 18 BRPM | DIASTOLIC BLOOD PRESSURE: 79 MMHG

## 2021-08-10 DIAGNOSIS — M79.10 MYALGIA: ICD-10-CM

## 2021-08-10 DIAGNOSIS — G43.909 MIGRAINE WITHOUT STATUS MIGRAINOSUS, NOT INTRACTABLE, UNSPECIFIED MIGRAINE TYPE: Primary | ICD-10-CM

## 2021-08-10 PROCEDURE — 99213 OFFICE O/P EST LOW 20 MIN: CPT | Performed by: NURSE PRACTITIONER

## 2021-08-10 RX ORDER — CYCLOBENZAPRINE HCL 10 MG
TABLET ORAL
COMMUNITY

## 2021-08-10 RX ORDER — BUTALBITAL, ACETAMINOPHEN AND CAFFEINE 50; 325; 40 MG/1; MG/1; MG/1
1 TABLET ORAL
COMMUNITY

## 2021-08-10 NOTE — PROGRESS NOTES
Kaitlynn Herring is a 31 y.o. female presenting today for Follow-up  . Chief Complaint   Patient presents with    Follow-up       HPI:  Kaitlynn Herring presents to the office today for aloe up care. She was seen in emergency room and diagnosed with migraine. Patient was given Flexeril and Fioricet for symptoms. Per the patient his symptoms have improved with the medication. She has not had to take any Fioricet today. She has been taking cyclobenzaprine nightly    ROS    ROS:  History obtained from the patient intake forms which are reviewed with the patient  · General: negative for - chills, fever, weight changes or malaise  · HEENT: no sore throat, nasal congestion, vision problems or ear problems  · Respiratory: no cough, shortness of breath, or wheezing  · Cardiovascular: no chest pain, palpitations, or dyspnea on exertion  · Gastrointestinal: no abdominal pain, N/V, change in bowel habits, or black or bloody stools  · Musculoskeletal: Myalgia  · Neurological: Migraine  · Endo:  No polyuria or polydipsia. · : no hematuria, dysuria, frequency, hesitancy, or nocturia.     · Psychological: negative for - anxiety, depression, sleep disturbances, suicidal or homicidal ideations    Allergies   Allergen Reactions    Latex, Natural Rubber Itching    Vicodin [Hydrocodone-Acetaminophen] Anxiety       PHQ Screening   3 most recent PHQ Screens 8/10/2021   Little interest or pleasure in doing things Not at all   Feeling down, depressed, irritable, or hopeless Not at all   Total Score PHQ 2 0       History  Past Medical History:   Diagnosis Date    Anxiety     Gestational diabetes     History of PCOS        Past Surgical History:   Procedure Laterality Date    [de-identified]  SECTION  , ,     HX UROLOGICAL  2021    CYSTOSCOPY,LEFT URETEROSCOPY, LASER,LEFT STENT EXCHANGE retrograde pyleogram; Dr. Chana Beavers History     Socioeconomic History    Marital status: SINGLE     Spouse name: Not on file    Number of children: Not on file    Years of education: Not on file    Highest education level: Not on file   Occupational History    Not on file   Tobacco Use    Smoking status: Never Smoker    Smokeless tobacco: Never Used   Substance and Sexual Activity    Alcohol use: Yes     Comment: rarely    Drug use: No    Sexual activity: Yes     Partners: Male     Birth control/protection: Condom   Other Topics Concern    Not on file   Social History Narrative    Not on file     Social Determinants of Health     Financial Resource Strain:     Difficulty of Paying Living Expenses:    Food Insecurity:     Worried About Running Out of Food in the Last Year:     [address] in the Last Year:    Transportation Needs:     Lack of Transportation (Medical):  Lack of Transportation (Non-Medical):    Physical Activity:     Days of Exercise per Week:     Minutes of Exercise per Session:    Stress:     Feeling of Stress :    Social Connections:     Frequency of Communication with Friends and Family:     Frequency of Social Gatherings with Friends and Family:     Attends Gnosticist Services:     Active Member of Clubs or Organizations:     Attends Club or Organization Meetings:     Marital Status:    Intimate Partner Violence:     Fear of Current or Ex-Partner:     Emotionally Abused:     Physically Abused:     Sexually Abused:        Current Outpatient Medications   Medication Sig Dispense Refill    cyclobenzaprine (FLEXERIL) 10 mg tablet Take  by mouth three (3) times daily as needed for Muscle Spasm(s).  butalbital-acetaminophen-caffeine (FIORICET, ESGIC) -40 mg per tablet Take 1 Tablet by mouth.  multivitamin (ONE A DAY) tablet Take 1 Tablet by mouth daily.  L.acid,gas,plan,rhm/B.ani/cran (UP4 PROBIOTICS WOMEN'S PO) Take  by mouth.  ibuprofen (MOTRIN PO) Take  by mouth.       cefpodoxime (VANTIN) 200 mg tablet TAKE 1 TABLET BY MOUTH 2 TIMES DAILY (Patient not taking: Reported on 8/10/2021)      B.infantis-B.ani-B.long-B.bifi (Probiotic 4X) 10-15 mg TbEC Take  by mouth. (Patient not taking: Reported on 8/10/2021)      linezolid (ZYVOX) 600 mg tablet Take 1 Tablet by mouth two (2) times a day. 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 Constitutional:       Appearance: Normal appearance. HENT:      Head: Normocephalic. Cardiovascular:      Rate and Rhythm: Normal rate and regular rhythm. Pulses: Normal pulses. Heart sounds: Normal heart sounds. Pulmonary:      Effort: Pulmonary effort is normal.      Breath sounds: Normal breath sounds. Skin:     General: Skin is warm and dry. Neurological:      General: No focal deficit present. Mental Status: She is alert.          Office Visit on 07/29/2021   Component Date Value Ref Range Status    Color (UA POC) 07/29/2021 Yellow   Final    Clarity (UA POC) 07/29/2021 Clear   Final    Glucose (UA POC) 07/29/2021 Negative  Negative Final    Bilirubin (UA POC) 07/29/2021 Negative  Negative Final    Ketones (UA POC) 07/29/2021 Negative  Negative Final    Specific gravity (UA POC) 07/29/2021 1.025  1.001 - 1.035 Final    Blood (UA POC) 07/29/2021 3+  Negative Final    pH (UA POC) 07/29/2021 5.5  4.6 - 8.0 Final    Protein (UA POC) 07/29/2021 2+  Negative Final    Urobilinogen (UA POC) 07/29/2021 0.2 mg/dL  0.2 - 1 Final    Nitrites (UA POC) 07/29/2021 Negative  Negative Final    Leukocyte esterase (UA POC) 07/29/2021 Negative  Negative Final   Admission on 07/01/2021, Discharged on 07/01/2021   Component Date Value Ref Range Status    Ventricular Rate 07/01/2021 119  BPM Final    Atrial Rate 07/01/2021 119  BPM Final    P-R Interval 07/01/2021 120  ms Final    QRS Duration 07/01/2021 80  ms Final    Q-T Interval 07/01/2021 338  ms Final    QTC Calculation (Bezet) 07/01/2021 475  ms Final    Calculated P Axis 07/01/2021 47  degrees Final    Calculated R Axis 07/01/2021 87  degrees Final    Calculated T Axis 07/01/2021 12  degrees Final    Diagnosis 07/01/2021    Final                    Value:Sinus tachycardia  Otherwise normal ECG  When compared with ECG of 20-JUN-2021 17:24,  Questionable change in QRS duration  Confirmed by Dee Michael MD, --- ([id number]) on 7/1/2021 10:26:44 AM      SARS-CoV-2 07/01/2021 Please find results under separate order    Final    Special Requests: 07/01/2021 NO SPECIAL REQUESTS    Final    Culture result: 07/01/2021 NO GROWTH 6 DAYS    Final    Special Requests: 07/01/2021 NO SPECIAL REQUESTS    Final    Culture result: 07/01/2021 NO GROWTH 6 DAYS    Final    Sodium 07/01/2021 135* 136 - 145 mmol/L Final    Potassium 07/01/2021 4.1  3.5 - 5.5 mmol/L Final    Chloride 07/01/2021 101  100 - 111 mmol/L Final    CO2 07/01/2021 24  21 - 32 mmol/L Final    Anion gap 07/01/2021 10  3.0 - 18 mmol/L Final    Glucose 07/01/2021 110* 74 - 99 mg/dL Final    BUN 07/01/2021 11  7.0 - 18 MG/DL Final    Creatinine 07/01/2021 0.76  0.6 - 1.3 MG/DL Final    BUN/Creatinine ratio 07/01/2021 14  12 - 20   Final    GFR est AA 07/01/2021 >60  >60 ml/min/1.73m2 Final    GFR est non-AA 07/01/2021 >60  >60 ml/min/1.73m2 Final    Comment: (NOTE)  Estimated GFR is calculated using the Modification of Diet in Renal   Disease (MDRD) Study equation, reported for both  Americans   (GFRAA) and non- Americans (GFRNA), and normalized to 1.73m2   body surface area. The physician must decide which value applies to   the patient. The MDRD study equation should only be used in   individuals age 25 or older.  It has not been validated for the   following: pregnant women, patients with serious comorbid conditions,   or on certain medications, or persons with extremes of body size,   muscle mass, or nutritional status.  Calcium 07/01/2021 9.1  8.5 - 10.1 MG/DL Final    Bilirubin, total 07/01/2021 0.6  0.2 - 1.0 MG/DL Final    ALT (SGPT) 07/01/2021 32  13 - 56 U/L Final    AST (SGOT) 07/01/2021 19  10 - 38 U/L Final    Alk. phosphatase 07/01/2021 56  45 - 117 U/L Final    Protein, total 07/01/2021 8.9* 6.4 - 8.2 g/dL Final    Albumin 07/01/2021 3.8  3.4 - 5.0 g/dL Final    Globulin 07/01/2021 5.1* 2.0 - 4.0 g/dL Final    A-G Ratio 07/01/2021 0.7* 0.8 - 1.7   Final    WBC 07/01/2021 10.7  4.6 - 13.2 K/uL Final    RBC 07/01/2021 4.48  4.20 - 5.30 M/uL Final    HGB 07/01/2021 12.0  12.0 - 16.0 g/dL Final    HCT 07/01/2021 37.5  35.0 - 45.0 % Final    MCV 07/01/2021 83.7  74.0 - 97.0 FL Final    MCH 07/01/2021 26.8  24.0 - 34.0 PG Final    MCHC 07/01/2021 32.0  31.0 - 37.0 g/dL Final    RDW 07/01/2021 13.1  11.6 - 14.5 % Final    PLATELET 84/34/2514 853* 135 - 420 K/uL Final    MPV 07/01/2021 8.5* 9.2 - 11.8 FL Final    NEUTROPHILS 07/01/2021 65  40 - 73 % Final    LYMPHOCYTES 07/01/2021 27  21 - 52 % Final    MONOCYTES 07/01/2021 6  3 - 10 % Final    EOSINOPHILS 07/01/2021 2  0 - 5 % Final    BASOPHILS 07/01/2021 0  0 - 2 % Final    ABS. NEUTROPHILS 07/01/2021 6.9  1.8 - 8.0 K/UL Final    ABS. LYMPHOCYTES 07/01/2021 2.8  0.9 - 3.6 K/UL Final    ABS. MONOCYTES 07/01/2021 0.7  0.05 - 1.2 K/UL Final    ABS. EOSINOPHILS 07/01/2021 0.2  0.0 - 0.4 K/UL Final    ABS.  BASOPHILS 07/01/2021 0.0  0.0 - 0.1 K/UL Final    DF 07/01/2021 AUTOMATED    Final    Troponin-I, QT 07/01/2021 <0.02  0.0 - 0.045 NG/ML Final    Comment: The presence of detectable troponin above the reference range indicates myocardial injury which may be due to ischemia, myocarditis, trauma, etc.  Clinical correlation is necessary to establish the significance of this finding. Sequential testing is recommended to determine if the typical rise and fall of cTnI is demonstrated. Note:  Cardiac troponin I has a relatively long half life and may be present well after the CK MB has returned to baseline. The reference range is based on the 99th percentile of the referent population.  NT pro-BNP 07/01/2021 14  0 - 450 PG/ML Final    Comment:           For patients with dyspnea, NT proBNP is highly sensitive for detecting acute congestive heart failure. Also, an NT proBNP <300 pg/mL effectively rules out acute congestive heart failure, with 99% negative predictive value. Our reference ranges are for acute dyspnea. Age              Range (pg/ml)        0-49                0-450        50-75               0-900        >75                 0-1800       For ambulatory office patients, the ranges below apply: For patients with dyspnea, NT proBNP is highly sensitive for detecting acute congestive heart failure. Also, an NT proBNP <300 pg/mL effectively rules out acute congestive heart failure, with 99% negative predictive value. Our reference ranges are for acute dyspnea.  Lactic Acid (POC) 07/01/2021 1.12  0.40 - 2.00 mmol/L Final    Specimen source 07/01/2021 Nasopharyngeal    Final    COVID-19 rapid test 07/01/2021 Not detected  NOTD   Final    Comment: Rapid Abbott ID Now       Rapid NAAT:  The specimen is NEGATIVE for SARS-CoV-2, the novel coronavirus associated with COVID-19. Negative results should be treated as presumptive and, if inconsistent with clinical signs and symptoms or necessary for patient management, should be tested with an alternative molecular assay. Negative results do not preclude SARS-CoV-2 infection and should not be used as the sole basis for patient management decisions. This test has been authorized by the FDA under an Emergency Use Authorization (EUA) for use by authorized laboratories.    Fact sheet for Healthcare Providers: ConventionUpdate.co.nz  Fact sheet for Patients: ConventionUpdate.co.nz       Methodology: Isothermal Nucleic Acid Amplification     Office Visit on 06/29/2021   Component Date Value Ref Range Status    Color (UA POC) 06/29/2021 Yellow   Final    Clarity (UA POC) 06/29/2021 Clear   Final    Glucose (UA POC) 06/29/2021 Negative  Negative Final    Bilirubin (UA POC) 06/29/2021 Negative  Negative Final    Ketones (UA POC) 06/29/2021 Negative  Negative Final    Specific gravity (UA POC) 06/29/2021 1.025  1.001 - 1.035 Final    Blood (UA POC) 06/29/2021 Trace  Negative Final    pH (UA POC) 06/29/2021 7.0  4.6 - 8.0 Final    Protein (UA POC) 06/29/2021 Negative  Negative Final    Urobilinogen (UA POC) 06/29/2021 0.2 mg/dL  0.2 - 1 Final    Nitrites (UA POC) 06/29/2021 Negative  Negative Final    Leukocyte esterase (UA POC) 06/29/2021 Negative  Negative Final   No results displayed because visit has over 200 results. Hospital Outpatient Visit on 06/16/2021   Component Date Value Ref Range Status    Component 1 06/16/2021 SEE NOTE    Final    Comment:   Calcium Oxalate Dihydrate ([place]) 40%    Calcium Oxalate Monohydrate ([place]) 40%    Carbonate Apatite ([place]) 20%    This test was developed and its analytical performance    characteristics have been determined by [facility]. It has not been cleared or approved by the    FDA. This assay has been validated pursuant to the CLIA    regulations and is used for clinical purposes.       Component 2 06/16/2021 N/A    Final    Comment: Test performed by [facility]                    [address],                    [place], [address]                    Phone: [phone]  Medical Director: Nicolas Hampton M.D.     Prasanth Arrow weight 06/16/2021 0.006  g Final    Comment: Test Reported by Kurt Alfonso,  [address] [place] [address] Yue Benitez, [address] M.D., Ph.D., Director of Laboratories  [phone], [facility] [id number]     Clinical Support on 06/08/2021   Component Date Value Ref Range Status    FINAL REPORT 06/08/2021 Microbiology results   Final    Comment: ANTIBIOTIC ALLERGIES  No Known Antibiotic Allergies    URINE SOURCE:  Straight Cath In/Out    COLONY COUNT / RESULT:   No Growth at 48 Hours     Office Visit on 06/02/2021   Component Date Value Ref Range Status    Urine Culture, Routine 06/02/2021    Final                    Value:Mixed urogenital aayush  10,000-25,000 colony forming units per mL         No results found for any visits on 08/10/21. Patient Care Team:  Patient Care Team:  Xavier Cintron NP as PCP - General (Nurse Practitioner)  Xavier Cintron NP as PCP - [facility]  Shraddha Adams NP as Nurse Practitioner (Gastroenterology)      Assessment / Plan:      ICD-10-CM ICD-9-CM    1. Migraine without status migrainosus, not intractable, unspecified migraine type  G43.909 346.90    2. Myalgia  M79.10 729.1          Follow-up and Dispositions    · Return in about 3 months (around 11/10/2021). I asked the patient if she  had any questions and answered her  questions. The patient stated that she understands the treatment plan and agrees with the treatment plan    This document was created with a voice activated dictation system and may contain transcription errors.

## 2021-08-10 NOTE — PROGRESS NOTES
Ida Estes is a 31 y.o. female that is here for a   Chief Complaint   Patient presents with    Follow-up         1. Have you been to the ER, urgent care clinic since your last visit? Hospitalized since your last visit? Yes     2. Have you seen or consulted any other health care providers outside of the [address] [place] [address] since your last visit? Include any pap smears or colon screening.  no      Health Maintenance reviewed - yes    Upcoming Appts        VORB:   Orders Placed This Encounter    cyclobenzaprine (FLEXERIL) 10 mg tablet    Jewel Das NP/ Nilsa Victor MA

## 2021-08-26 ENCOUNTER — OFFICE VISIT (OUTPATIENT)
Dept: FAMILY MEDICINE CLINIC | Age: 31
End: 2021-08-26
Payer: MEDICAID

## 2021-08-26 VITALS
DIASTOLIC BLOOD PRESSURE: 104 MMHG | RESPIRATION RATE: 16 BRPM | HEIGHT: 63 IN | WEIGHT: 247 LBS | SYSTOLIC BLOOD PRESSURE: 138 MMHG | BODY MASS INDEX: 43.77 KG/M2 | TEMPERATURE: 97.7 F | OXYGEN SATURATION: 95 % | HEART RATE: 110 BPM

## 2021-08-26 DIAGNOSIS — R03.0 ELEVATED BLOOD PRESSURE READING: ICD-10-CM

## 2021-08-26 DIAGNOSIS — Z13.21 ENCOUNTER FOR VITAMIN DEFICIENCY SCREENING: ICD-10-CM

## 2021-08-26 DIAGNOSIS — Z13.220 ENCOUNTER FOR LIPID SCREENING FOR CARDIOVASCULAR DISEASE: ICD-10-CM

## 2021-08-26 DIAGNOSIS — R53.83 FATIGUE, UNSPECIFIED TYPE: ICD-10-CM

## 2021-08-26 DIAGNOSIS — Z13.6 ENCOUNTER FOR LIPID SCREENING FOR CARDIOVASCULAR DISEASE: ICD-10-CM

## 2021-08-26 DIAGNOSIS — R35.0 FREQUENCY OF URINATION: Primary | ICD-10-CM

## 2021-08-26 LAB
BILIRUB UR QL STRIP: NEGATIVE
GLUCOSE UR-MCNC: NEGATIVE MG/DL
KETONES P FAST UR STRIP-MCNC: NEGATIVE MG/DL
PH UR STRIP: 8.5 [PH] (ref 4.6–8)
PROT UR QL STRIP: NEGATIVE
SP GR UR STRIP: 1.01 (ref 1–1.03)
UA UROBILINOGEN AMB POC: ABNORMAL (ref 0.2–1)
URINALYSIS CLARITY POC: ABNORMAL
URINALYSIS COLOR POC: ABNORMAL
URINE BLOOD POC: ABNORMAL
URINE LEUKOCYTES POC: NEGATIVE
URINE NITRITES POC: NEGATIVE

## 2021-08-26 PROCEDURE — 81003 URINALYSIS AUTO W/O SCOPE: CPT | Performed by: NURSE PRACTITIONER

## 2021-08-26 PROCEDURE — 99213 OFFICE O/P EST LOW 20 MIN: CPT | Performed by: NURSE PRACTITIONER

## 2021-08-26 NOTE — PROGRESS NOTES
Troy Skinner presents today for   Chief Complaint   Patient presents with    Fatigue    Urinary Frequency    Medication Evaluation       Is someone accompanying this pt? no    Is the patient using any DME equipment during OV? no    Depression Screening:  3 most recent PHQ Screens 8/26/2021   Little interest or pleasure in doing things Not at all   Feeling down, depressed, irritable, or hopeless Not at all   Total Score PHQ 2 0       Learning Assessment:  Learning Assessment 7/7/2021   PRIMARY LEARNER Patient   HIGHEST LEVEL OF EDUCATION - PRIMARY LEARNER  GRADUATED HIGH SCHOOL OR GED   BARRIERS PRIMARY LEARNER NONE   CO-LEARNER CAREGIVER -   PRIMARY LANGUAGE ENGLISH    NEED -   LEARNER PREFERENCE PRIMARY LISTENING   LEARNING SPECIAL TOPICS -   ANSWERED BY patient   RELATIONSHIP SELF       Health Maintenance reviewed and discussed and ordered per Provider. Health Maintenance Due   Topic Date Due    PAP AKA CERVICAL CYTOLOGY  01/01/2016   . Coordination of Care:  1. Have you been to the ER, urgent care clinic since your last visit? Hospitalized since your last visit? no    2. Have you seen or consulted any other health care providers outside of the [address] [place] [address] since your last visit? Include any pap smears or colon screening.  no

## 2021-08-26 NOTE — PROGRESS NOTES
Brittani Reynoso is a 31 y.o. female presenting today for Fatigue, Urinary Frequency, and Medication Evaluation  . Chief Complaint   Patient presents with    Fatigue    Urinary Frequency    Medication Evaluation       HPI:  Brittani Reynoso presents to the office today for plaints of urinary frequency and fatigue. Patient notes that she has not been feeling fine. She believes that she has a urinary tract infection. She denies any dysuria or urgency. Patient is also complaining about fatigue. Notes that she sleeps most of the day. She denied she denies any chest pain or palpitation. Patient presents with elevated BP reading of 138/104. She denies any headaches but does have intermittent, brief episodes of dizziness   She would like the opportunity to work on lifestyle modification prior to starting any medications. Snoring or any known sleep apnea. ROS    ROS:  History obtained from the patient intake forms which are reviewed with the patient  · General: fatigue  · HEENT: no sore throat, nasal congestion, vision problems or ear problems  · Respiratory: no cough, shortness of breath, or wheezing  · Cardiovascular: no chest pain, palpitations, or dyspnea on exertion  · Gastrointestinal: no abdominal pain, N/V, change in bowel habits, or black or bloody stools  · Musculoskeletal: no back pain, joint pain, joint stiffness, muscle pain or muscle weakness  · Neurological: no numbness, tingling, headache or dizziness  · Endo:  No polyuria or polydipsia.    · :urinary frequency  · Psychological: negative for - anxiety, depression, sleep disturbances, suicidal or homicidal ideations      Allergies   Allergen Reactions    Latex, Natural Rubber Itching    Vicodin [Hydrocodone-Acetaminophen] Anxiety       PHQ Screening   3 most recent PHQ Screens 8/26/2021   Little interest or pleasure in doing things Not at all   Feeling down, depressed, irritable, or hopeless Not at all   Total Score PHQ 2 0 History  Past Medical History:   Diagnosis Date    Anxiety     Gestational diabetes     History of PCOS        Past Surgical History:   Procedure Laterality Date    HX  SECTION  , ,     HX UROLOGICAL  2021    CYSTOSCOPY,LEFT URETEROSCOPY, LASER,LEFT STENT EXCHANGE retrograde pyleogram; Dr. Bubba Dotson History     Socioeconomic History    Marital status: SINGLE     Spouse name: Not on file    Number of children: Not on file    Years of education: Not on file    Highest education level: Not on file   Occupational History    Not on file   Tobacco Use    Smoking status: Never Smoker    Smokeless tobacco: Never Used   Substance and Sexual Activity    Alcohol use: Yes     Comment: rarely    Drug use: No    Sexual activity: Yes     Partners: Male     Birth control/protection: Condom   Other Topics Concern    Not on file   Social History Narrative    Not on file     Social Determinants of Health     Financial Resource Strain:     Difficulty of Paying Living Expenses:    Food Insecurity:     Worried About Running Out of Food in the Last Year:     [address] in the Last Year:    Transportation Needs:     Lack of Transportation (Medical):      Lack of Transportation (Non-Medical):    Physical Activity:     Days of Exercise per Week:     Minutes of Exercise per Session:    Stress:     Feeling of Stress :    Social Connections:     Frequency of Communication with Friends and Family:     Frequency of Social Gatherings with Friends and Family:     Attends Christian Services:     Active Member of Clubs or Organizations:     Attends Club or Organization Meetings:     Marital Status:    Intimate Partner Violence:     Fear of Current or Ex-Partner:     Emotionally Abused:     Physically Abused:     Sexually Abused:        Current Outpatient Medications   Medication Sig Dispense Refill    cyclobenzaprine (FLEXERIL) 10 mg tablet Take  by mouth three (3) times daily as needed for Muscle Spasm(s).  butalbital-acetaminophen-caffeine (FIORICET, ESGIC) -40 mg per tablet Take 1 Tablet by mouth.  multivitamin (ONE A DAY) tablet Take 1 Tablet by mouth daily.  L.acid,gas,plan,rhm/B.ani/cran (UP4 PROBIOTICS WOMEN'S PO) Take  by mouth.  ibuprofen (MOTRIN PO) Take  by mouth.  cefpodoxime (VANTIN) 200 mg tablet TAKE 1 TABLET BY MOUTH 2 TIMES DAILY (Patient not taking: Reported on 8/10/2021)      B.infantis-B.ani-B.long-B.bifi (Probiotic 4X) 10-15 mg TbEC Take  by mouth. (Patient not taking: Reported on 8/10/2021)      linezolid (ZYVOX) 600 mg tablet Take 1 Tablet by mouth two (2) times a day. 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 Constitutional:       Appearance: Normal appearance. HENT:      Head: Normocephalic. Cardiovascular:      Rate and Rhythm: Normal rate and regular rhythm. Pulses: Normal pulses. Heart sounds: Normal heart sounds. Pulmonary:      Effort: Pulmonary effort is normal.      Breath sounds: Normal breath sounds. Abdominal:      General: Bowel sounds are normal.   Skin:     General: Skin is warm and dry. Neurological:      Mental Status: She is alert and oriented to person, place, and time.          Office Visit on 08/26/2021   Component Date Value Ref Range Status    Color (UA POC) 08/26/2021 Light Yellow   Final    Clarity (UA POC) 08/26/2021 Slightly Cloudy   Final    Glucose (UA POC) 08/26/2021 Negative  Negative Final    Bilirubin (UA POC) 08/26/2021 Negative  Negative Final    Ketones (UA POC) 08/26/2021 Negative  Negative Final    Specific gravity (UA POC) 08/26/2021 1.015  1.001 - 1.035 Final    Blood (UA POC) 08/26/2021 Trace  Negative Final    pH (UA POC) 08/26/2021 8.5* 4.6 - 8.0 Final    Protein (UA POC) 08/26/2021 Negative  Negative Final    Urobilinogen (UA POC) 08/26/2021 0.2 mg/dL  0.2 - 1 Final    Nitrites (UA POC) 08/26/2021 Negative  Negative Final    Leukocyte esterase (UA POC) 08/26/2021 Negative  Negative Final   Office Visit on 07/29/2021   Component Date Value Ref Range Status    Color (UA POC) 07/29/2021 Yellow   Final    Clarity (UA POC) 07/29/2021 Clear   Final    Glucose (UA POC) 07/29/2021 Negative  Negative Final    Bilirubin (UA POC) 07/29/2021 Negative  Negative Final    Ketones (UA POC) 07/29/2021 Negative  Negative Final    Specific gravity (UA POC) 07/29/2021 1.025  1.001 - 1.035 Final    Blood (UA POC) 07/29/2021 3+  Negative Final    pH (UA POC) 07/29/2021 5.5  4.6 - 8.0 Final    Protein (UA POC) 07/29/2021 2+  Negative Final    Urobilinogen (UA POC) 07/29/2021 0.2 mg/dL  0.2 - 1 Final    Nitrites (UA POC) 07/29/2021 Negative  Negative Final    Leukocyte esterase (UA POC) 07/29/2021 Negative  Negative Final   Admission on 07/01/2021, Discharged on 07/01/2021   Component Date Value Ref Range Status    Ventricular Rate 07/01/2021 119  BPM Final    Atrial Rate 07/01/2021 119  BPM Final    P-R Interval 07/01/2021 120  ms Final    QRS Duration 07/01/2021 80  ms Final    Q-T Interval 07/01/2021 338  ms Final    QTC Calculation (Bezet) 07/01/2021 475  ms Final    Calculated P Axis 07/01/2021 47  degrees Final    Calculated R Axis 07/01/2021 87  degrees Final    Calculated T Axis 07/01/2021 12  degrees Final    Diagnosis 07/01/2021    Final                    Value:Sinus tachycardia  Otherwise normal ECG  When compared with ECG of 20-JUN-2021 17:24,  Questionable change in QRS duration  Confirmed by Lamonte Garvin MD, --- ([id number]) on 7/1/2021 10:26:44 AM      SARS-CoV-2 07/01/2021 Please find results under separate order    Final    Special Requests: 07/01/2021 NO SPECIAL REQUESTS    Final    Culture result: 07/01/2021 NO GROWTH 6 DAYS    Final    Special Requests: 07/01/2021 NO SPECIAL REQUESTS    Final    Culture result: 07/01/2021 NO GROWTH 6 DAYS    Final    Sodium 07/01/2021 135* 136 - 145 mmol/L Final    Potassium 07/01/2021 4.1  3.5 - 5.5 mmol/L Final    Chloride 07/01/2021 101  100 - 111 mmol/L Final    CO2 07/01/2021 24  21 - 32 mmol/L Final    Anion gap 07/01/2021 10  3.0 - 18 mmol/L Final    Glucose 07/01/2021 110* 74 - 99 mg/dL Final    BUN 07/01/2021 11  7.0 - 18 MG/DL Final    Creatinine 07/01/2021 0.76  0.6 - 1.3 MG/DL Final    BUN/Creatinine ratio 07/01/2021 14  12 - 20   Final    GFR est AA 07/01/2021 >60  >60 ml/min/1.73m2 Final    GFR est non-AA 07/01/2021 >60  >60 ml/min/1.73m2 Final    Comment: (NOTE)  Estimated GFR is calculated using the Modification of Diet in Renal   Disease (MDRD) Study equation, reported for both  Americans   (GFRAA) and non- Americans (GFRNA), and normalized to 1.73m2   body surface area. The physician must decide which value applies to   the patient. The MDRD study equation should only be used in   individuals age 25 or older. It has not been validated for the   following: pregnant women, patients with serious comorbid conditions,   or on certain medications, or persons with extremes of body size,   muscle mass, or nutritional status.  Calcium 07/01/2021 9.1  8.5 - 10.1 MG/DL Final    Bilirubin, total 07/01/2021 0.6  0.2 - 1.0 MG/DL Final    ALT (SGPT) 07/01/2021 32  13 - 56 U/L Final    AST (SGOT) 07/01/2021 19  10 - 38 U/L Final    Alk.  phosphatase 07/01/2021 56  45 - 117 U/L Final    Protein, total 07/01/2021 8.9* 6.4 - 8.2 g/dL Final    Albumin 07/01/2021 3.8  3.4 - 5.0 g/dL Final    Globulin 07/01/2021 5.1* 2.0 - 4.0 g/dL Final    A-G Ratio 07/01/2021 0.7* 0.8 - 1.7   Final    WBC 07/01/2021 10.7  4.6 - 13.2 K/uL Final    RBC 07/01/2021 4.48  4.20 - 5.30 M/uL Final    HGB 07/01/2021 12.0  12.0 - 16.0 g/dL Final    HCT 07/01/2021 37.5  35.0 - 45.0 % Final    MCV 07/01/2021 83.7  74.0 - 97.0 FL Final    MCH 07/01/2021 26.8  24.0 - 34.0 PG Final    MCHC 07/01/2021 32.0  31.0 - 37.0 g/dL Final    RDW 07/01/2021 13.1  11.6 - 14.5 % Final    PLATELET 40/46/8627 408* 135 - 420 K/uL Final    MPV 07/01/2021 8.5* 9.2 - 11.8 FL Final    NEUTROPHILS 07/01/2021 65  40 - 73 % Final    LYMPHOCYTES 07/01/2021 27  21 - 52 % Final    MONOCYTES 07/01/2021 6  3 - 10 % Final    EOSINOPHILS 07/01/2021 2  0 - 5 % Final    BASOPHILS 07/01/2021 0  0 - 2 % Final    ABS. NEUTROPHILS 07/01/2021 6.9  1.8 - 8.0 K/UL Final    ABS. LYMPHOCYTES 07/01/2021 2.8  0.9 - 3.6 K/UL Final    ABS. MONOCYTES 07/01/2021 0.7  0.05 - 1.2 K/UL Final    ABS. EOSINOPHILS 07/01/2021 0.2  0.0 - 0.4 K/UL Final    ABS. BASOPHILS 07/01/2021 0.0  0.0 - 0.1 K/UL Final    DF 07/01/2021 AUTOMATED    Final    Troponin-I, QT 07/01/2021 <0.02  0.0 - 0.045 NG/ML Final    Comment: The presence of detectable troponin above the reference range indicates myocardial injury which may be due to ischemia, myocarditis, trauma, etc.  Clinical correlation is necessary to establish the significance of this finding. Sequential testing is recommended to determine if the typical rise and fall of cTnI is demonstrated. Note:  Cardiac troponin I has a relatively long half life and may be present well after the CK MB has returned to baseline. The reference range is based on the 99th percentile of the referent population.  NT pro-BNP 07/01/2021 14  0 - 450 PG/ML Final    Comment:           For patients with dyspnea, NT proBNP is highly sensitive for detecting acute congestive heart failure. Also, an NT proBNP <300 pg/mL effectively rules out acute congestive heart failure, with 99% negative predictive value.  Our reference ranges are for acute dyspnea. Age              Range (pg/ml)        0-49                0-450        50-75               0-900        >75                 0-1800       For ambulatory office patients, the ranges below apply: For patients with dyspnea, NT proBNP is highly sensitive for detecting acute congestive heart failure. Also, an NT proBNP <300 pg/mL effectively rules out acute congestive heart failure, with 99% negative predictive value. Our reference ranges are for acute dyspnea.  Lactic Acid (POC) 07/01/2021 1.12  0.40 - 2.00 mmol/L Final    Specimen source 07/01/2021 Nasopharyngeal    Final    COVID-19 rapid test 07/01/2021 Not detected  NOTD   Final    Comment: Rapid Abbott ID Now       Rapid NAAT:  The specimen is NEGATIVE for SARS-CoV-2, the novel coronavirus associated with COVID-19. Negative results should be treated as presumptive and, if inconsistent with clinical signs and symptoms or necessary for patient management, should be tested with an alternative molecular assay. Negative results do not preclude SARS-CoV-2 infection and should not be used as the sole basis for patient management decisions. This test has been authorized by the FDA under an Emergency Use Authorization (EUA) for use by authorized laboratories.    Fact sheet for Healthcare Providers: ConventionUpdate.co.nz  Fact sheet for Patients: ConventionUpdate.co.nz       Methodology: Isothermal Nucleic Acid Amplification     Office Visit on 06/29/2021   Component Date Value Ref Range Status    Color (UA POC) 06/29/2021 Yellow   Final    Clarity (UA POC) 06/29/2021 Clear   Final    Glucose (UA POC) 06/29/2021 Negative  Negative Final    Bilirubin (UA POC) 06/29/2021 Negative  Negative Final    Ketones (UA POC) 06/29/2021 Negative  Negative Final    Specific gravity (UA POC) 06/29/2021 1.025  1.001 - 1.035 Final    Blood (UA POC) 06/29/2021 Trace  Negative Final    pH (UA POC) 06/29/2021 7.0  4.6 - 8.0 Final    Protein (UA POC) 06/29/2021 Negative  Negative Final    Urobilinogen (UA POC) 06/29/2021 0.2 mg/dL  0.2 - 1 Final    Nitrites (UA POC) 06/29/2021 Negative  Negative Final    Leukocyte esterase (UA POC) 06/29/2021 Negative  Negative Final   No results displayed because visit has over 200 results. Hospital Outpatient Visit on 06/16/2021   Component Date Value Ref Range Status    Component 1 06/16/2021 SEE NOTE    Final    Comment:   Calcium Oxalate Dihydrate ([place]) 40%    Calcium Oxalate Monohydrate ([place]) 40%    Carbonate Apatite ([place]) 20%    This test was developed and its analytical performance    characteristics have been determined by [facility] Davey. It has not been cleared or approved by the    FDA. This assay has been validated pursuant to the CLIA    regulations and is used for clinical purposes.       Component 2 06/16/2021 N/A    Final    Comment: Test performed by [facility]                    [address] [facility] [address] [address],                    [place], [address]                    Phone: [phone]  Medical Director: Keely Mack M.D.     Tauna Jose weight 06/16/2021 0.006  g Final    Comment: Test Reported by Tino Dsouza,  [facility],  [address] [place] [address], [address] [place] [address] Paddy Alfonso M.D., Ph.D., Director of Laboratories  [phone], CLIA [id number]     Clinical Support on 06/08/2021   Component Date Value Ref Range Status    FINAL REPORT 06/08/2021 Microbiology results   Final    Comment: ANTIBIOTIC ALLERGIES  No Known Antibiotic Allergies    URINE SOURCE:  Straight Cath In/Out    COLONY COUNT / RESULT:   No Growth at 48 Hours     Office Visit on 06/02/2021   Component Date Value Ref Range Status    Urine Culture, Routine 06/02/2021    Final                    Value:Mixed urogenital aayush  10,000-25,000 colony forming units per mL         Results for orders placed or performed in visit on 08/26/21   AMB POC URINALYSIS DIP STICK AUTO W/O MICRO   Result Value Ref Range    Color (UA POC) Light Yellow     Clarity (UA POC) Slightly Cloudy     Glucose (UA POC) Negative Negative    Bilirubin (UA POC) Negative Negative    Ketones (UA POC) Negative Negative    Specific gravity (UA POC) 1.015 1.001 - 1.035    Blood (UA POC) Trace Negative    pH (UA POC) 8.5 (A) 4.6 - 8.0    Protein (UA POC) Negative Negative    Urobilinogen (UA POC) 0.2 mg/dL 0.2 - 1    Nitrites (UA POC) Negative Negative    Leukocyte esterase (UA POC) Negative Negative       Patient Care Team:  Patient Care Team:  Eda Bai NP as PCP - General (Nurse Practitioner)  Eda Bai NP as PCP - [facility]  Maryellen Alicea NP as Nurse Practitioner (Gastroenterology)      Assessment / Plan:      ICD-10-CM ICD-9-CM    1. Frequency of urination  R35.0 788.41 AMB POC URINALYSIS DIP STICK AUTO W/O MICRO      CULTURE, URINE      CULTURE, URINE   2. Encounter for lipid screening for cardiovascular disease  Z13.220 V77.91 CBC WITH AUTOMATED DIFF    Z13.6 V81.2    3. Encounter for vitamin deficiency screening  Z13.21 V77.99 VITAMIN D, 25 HYDROXY      VITAMIN D, 25 HYDROXY   4. Elevated blood pressure reading  R03.0 796.2    5. Fatigue, unspecified type  R53.83 780.79      Frequency of urine-urinalysis negative for UTI. Will send for urine culture  Patient previously had a CBC ordered we will draw today in the lab as well as a vitamin D  Elevated BP reading-reevaluate in 3 weeks. Follow a low-sodium diet and work on lifestyle modifications. Fatigue-labs drawn    Follow-up and Dispositions    · Return in about 3 weeks (around 9/16/2021). I asked the patient if she  had any questions and answered her  questions.   The patient stated that she understands the treatment plan and agrees with the treatment plan    This document was created with a voice activated dictation system and may contain transcription errors.

## 2021-08-27 LAB
25(OH)D3 SERPL-MCNC: 36.2 NG/ML (ref 32–100)
ABSOLUTE LYMPHOCYTE COUNT, 10803: 2.7 K/UL (ref 1–4.8)
BASOPHILS # BLD: 0.1 K/UL (ref 0–0.2)
BASOPHILS NFR BLD: 1 % (ref 0–2)
EOSINOPHIL # BLD: 0.2 K/UL (ref 0–0.5)
EOSINOPHIL NFR BLD: 2 % (ref 0–6)
ERYTHROCYTE [DISTWIDTH] IN BLOOD BY AUTOMATED COUNT: 14.7 % (ref 10–15.5)
GRANULOCYTES,GRANS: 60 % (ref 40–75)
HCT VFR BLD AUTO: 43 % (ref 35.1–46.5)
HGB BLD-MCNC: 12.9 G/DL (ref 11.7–15.5)
LYMPHOCYTES, LYMLT: 32 % (ref 20–45)
MCH RBC QN AUTO: 28 PG (ref 26–34)
MCHC RBC AUTO-ENTMCNC: 30 G/DL (ref 31–36)
MCV RBC AUTO: 94 FL (ref 81–99)
MONOCYTES # BLD: 0.5 K/UL (ref 0.1–1)
MONOCYTES NFR BLD: 6 % (ref 3–12)
NEUTROPHILS # BLD AUTO: 5 K/UL (ref 1.8–7.7)
PLATELET # BLD AUTO: 392 K/UL (ref 140–440)
PMV BLD AUTO: 8.9 FL (ref 9–13)
RBC # BLD AUTO: 4.58 M/UL (ref 3.8–5.2)
WBC # BLD AUTO: 8.4 K/UL (ref 4–11)

## 2021-08-28 LAB — RESULT: NORMAL

## 2021-08-31 NOTE — PROGRESS NOTES
Please notify patient that lab results were reviewed and the results are ok. .  Repeat urine on next visit in September

## 2021-09-03 DIAGNOSIS — R39.9 URINARY SYMPTOM OR SIGN: Primary | ICD-10-CM

## 2021-09-27 PROBLEM — R73.03 PREDIABETES: Status: ACTIVE | Noted: 2021-09-27

## 2021-09-27 PROBLEM — E66.01 CLASS 3 SEVERE OBESITY DUE TO EXCESS CALORIES WITHOUT SERIOUS COMORBIDITY WITH BODY MASS INDEX (BMI) OF 40.0 TO 44.9 IN ADULT (HCC): Status: ACTIVE | Noted: 2021-05-20

## 2021-09-27 PROBLEM — N30.00 ACUTE CYSTITIS WITHOUT HEMATURIA: Status: ACTIVE | Noted: 2021-05-20

## 2021-09-27 PROBLEM — E55.9 VITAMIN D DEFICIENCY: Status: ACTIVE | Noted: 2021-05-20

## 2021-09-27 PROBLEM — N20.0 LEFT NEPHROLITHIASIS: Status: ACTIVE | Noted: 2021-05-20

## 2021-09-27 PROBLEM — N20.1 URETERAL CALCULUS: Status: ACTIVE | Noted: 2021-05-20

## 2021-10-04 ENCOUNTER — HOSPITAL ENCOUNTER (EMERGENCY)
Age: 31
Discharge: HOME | End: 2021-10-05
Attending: STUDENT IN AN ORGANIZED HEALTH CARE EDUCATION/TRAINING PROGRAM | Admitting: STUDENT IN AN ORGANIZED HEALTH CARE EDUCATION/TRAINING PROGRAM
Payer: MEDICAID

## 2021-10-04 VITALS
DIASTOLIC BLOOD PRESSURE: 101 MMHG | TEMPERATURE: 98.1 F | SYSTOLIC BLOOD PRESSURE: 146 MMHG | OXYGEN SATURATION: 98 % | WEIGHT: 240 LBS | BODY MASS INDEX: 42.51 KG/M2 | HEART RATE: 116 BPM | RESPIRATION RATE: 16 BRPM

## 2021-10-04 DIAGNOSIS — R82.71 BACTERIURIA: ICD-10-CM

## 2021-10-04 DIAGNOSIS — S20.212A CONTUSION OF LEFT CHEST WALL, INITIAL ENCOUNTER: Primary | ICD-10-CM

## 2021-10-04 DIAGNOSIS — O26.891 ABDOMINAL PAIN IN PREGNANCY, FIRST TRIMESTER: ICD-10-CM

## 2021-10-04 DIAGNOSIS — R10.9 ABDOMINAL PAIN IN PREGNANCY, FIRST TRIMESTER: ICD-10-CM
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
## 2021-10-04 PROCEDURE — 99283 EMERGENCY DEPT VISIT LOW MDM: CPT

## 2021-10-05 ENCOUNTER — HOSPITAL ENCOUNTER (EMERGENCY)
Dept: ULTRASOUND IMAGING | Age: 31
Discharge: HOME | End: 2021-10-05
Attending: PHYSICIAN ASSISTANT
Payer: MEDICAID

## 2021-10-05 ENCOUNTER — APPOINTMENT (OUTPATIENT)
Dept: GENERAL RADIOLOGY | Age: 31
End: 2021-10-05
Attending: PHYSICIAN ASSISTANT
Payer: MEDICAID

## 2021-10-05 LAB
ALBUMIN SERPL-MCNC: 3.7 G/DL (ref 3.4–5)
ALBUMIN/GLOB SERPL: 0.9 {RATIO} (ref 0.8–1.7)
ALP SERPL-CCNC: 59 U/L (ref 45–117)
ALT SERPL-CCNC: 47 U/L (ref 13–56)
ANION GAP SERPL CALC-SCNC: 7 MMOL/L (ref 3–18)
APPEARANCE UR: ABNORMAL
AST SERPL-CCNC: 28 U/L (ref 10–38)
BACTERIA URNS QL MICRO: ABNORMAL /HPF
BASOPHILS # BLD: 0 K/UL (ref 0–0.1)
BASOPHILS NFR BLD: 0 % (ref 0–2)
BILIRUB SERPL-MCNC: 0.8 MG/DL (ref 0.2–1)
BILIRUB UR QL: NEGATIVE
BUN SERPL-MCNC: 7 MG/DL (ref 7–18)
BUN/CREAT SERPL: 12 (ref 12–20)
C TRACH RRNA SPEC QL NAA+PROBE: NEGATIVE
CALCIUM SERPL-MCNC: 9.1 MG/DL (ref 8.5–10.1)
CHLORIDE SERPL-SCNC: 100 MMOL/L (ref 100–111)
CO2 SERPL-SCNC: 29 MMOL/L (ref 21–32)
COLOR UR: YELLOW
CREAT SERPL-MCNC: 0.59 MG/DL (ref 0.6–1.3)
DIFFERENTIAL METHOD BLD: ABNORMAL
EOSINOPHIL # BLD: 0.2 K/UL (ref 0–0.4)
EOSINOPHIL NFR BLD: 2 % (ref 0–5)
EPITH CASTS URNS QL MICRO: ABNORMAL /LPF (ref 0–5)
ERYTHROCYTE [DISTWIDTH] IN BLOOD BY AUTOMATED COUNT: 13.7 % (ref 11.6–14.5)
GLOBULIN SER CALC-MCNC: 4.2 G/DL (ref 2–4)
GLUCOSE SERPL-MCNC: 107 MG/DL (ref 74–99)
GLUCOSE UR STRIP.AUTO-MCNC: NEGATIVE MG/DL
HCG SERPL-ACNC: 6951 MIU/ML (ref 0–10)
HCG UR QL: POSITIVE
HCT VFR BLD AUTO: 36.7 % (ref 35–45)
HGB BLD-MCNC: 12.3 G/DL (ref 12–16)
HGB UR QL STRIP: NEGATIVE
KETONES UR QL STRIP.AUTO: ABNORMAL MG/DL
LEUKOCYTE ESTERASE UR QL STRIP.AUTO: NEGATIVE
LYMPHOCYTES # BLD: 3.3 K/UL (ref 0.9–3.6)
LYMPHOCYTES NFR BLD: 25 % (ref 21–52)
MCH RBC QN AUTO: 28.5 PG (ref 24–34)
MCHC RBC AUTO-ENTMCNC: 33.5 G/DL (ref 31–37)
MCV RBC AUTO: 85.2 FL (ref 78–100)
MONOCYTES # BLD: 0.6 K/UL (ref 0.05–1.2)
MONOCYTES NFR BLD: 5 % (ref 3–10)
N GONORRHOEA RRNA SPEC QL NAA+PROBE: NEGATIVE
NEUTS SEG # BLD: 8.8 K/UL (ref 1.8–8)
NEUTS SEG NFR BLD: 67 % (ref 40–73)
NITRITE UR QL STRIP.AUTO: NEGATIVE
PH UR STRIP: 5.5 [PH] (ref 5–8)
PLATELET # BLD AUTO: 359 K/UL (ref 135–420)
PMV BLD AUTO: 9 FL (ref 9.2–11.8)
POTASSIUM SERPL-SCNC: 3.9 MMOL/L (ref 3.5–5.5)
PROT SERPL-MCNC: 7.9 G/DL (ref 6.4–8.2)
PROT UR STRIP-MCNC: 30 MG/DL
RBC # BLD AUTO: 4.31 M/UL (ref 4.2–5.3)
SERVICE CMNT-IMP: NORMAL
SODIUM SERPL-SCNC: 136 MMOL/L (ref 136–145)
SP GR UR REFRACTOMETRY: 1.02 (ref 1–1.03)
SPECIMEN SOURCE: NORMAL
UROBILINOGEN UR QL STRIP.AUTO: 1 EU/DL (ref 0.2–1)
WBC # BLD AUTO: 13 K/UL (ref 4.6–13.2)
WBC URNS QL MICRO: ABNORMAL /HPF (ref 0–4)
WET PREP GENITAL: NORMAL

## 2021-10-05 PROCEDURE — 87086 URINE CULTURE/COLONY COUNT: CPT

## 2021-10-05 PROCEDURE — 80053 COMPREHEN METABOLIC PANEL: CPT

## 2021-10-05 PROCEDURE — 87491 CHLMYD TRACH DNA AMP PROBE: CPT

## 2021-10-05 PROCEDURE — 81025 URINE PREGNANCY TEST: CPT

## 2021-10-05 PROCEDURE — 71100 X-RAY EXAM RIBS UNI 2 VIEWS: CPT

## 2021-10-05 PROCEDURE — 85025 COMPLETE CBC W/AUTO DIFF WBC: CPT

## 2021-10-05 PROCEDURE — 87210 SMEAR WET MOUNT SALINE/INK: CPT

## 2021-10-05 PROCEDURE — 76801 OB US < 14 WKS SINGLE FETUS: CPT

## 2021-10-05 PROCEDURE — 81001 URINALYSIS AUTO W/SCOPE: CPT

## 2021-10-05 PROCEDURE — 84702 CHORIONIC GONADOTROPIN TEST: CPT

## 2021-10-05 RX ORDER — CEPHALEXIN 500 MG/1
500 CAPSULE ORAL 2 TIMES DAILY
Qty: 14 CAPSULE | Refills: 0 | Status: SHIPPED | OUTPATIENT
Start: 2021-10-05 | End: 2021-10-12
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
## 2021-10-06 LAB
BACTERIA SPEC CULT: NORMAL
CC UR VC: NORMAL
SERVICE CMNT-IMP: NORMAL

## 2022-02-05 ENCOUNTER — HOSPITAL ENCOUNTER (EMERGENCY)
Age: 32
Discharge: HOME | End: 2022-02-06
Attending: EMERGENCY MEDICINE
Payer: MEDICAID

## 2022-02-05 DIAGNOSIS — R00.2 PALPITATIONS: ICD-10-CM

## 2022-02-05 DIAGNOSIS — R51.9 ACUTE NONINTRACTABLE HEADACHE, UNSPECIFIED HEADACHE TYPE: Primary | ICD-10-CM

## 2022-02-05 DIAGNOSIS — E86.0 DEHYDRATION: ICD-10-CM

## 2022-02-05 DIAGNOSIS — R42 DIZZINESS: ICD-10-CM

## 2022-02-05 LAB
ALBUMIN SERPL-MCNC: 3.9 G/DL (ref 3.4–5)
ALBUMIN/GLOB SERPL: 1 {RATIO} (ref 0.8–1.7)
ALP SERPL-CCNC: 59 U/L (ref 45–117)
ALT SERPL-CCNC: 25 U/L (ref 13–56)
ANION GAP SERPL CALC-SCNC: 3 MMOL/L (ref 3–18)
AST SERPL-CCNC: 10 U/L (ref 10–38)
BASOPHILS # BLD: 0.1 K/UL (ref 0–0.1)
BASOPHILS NFR BLD: 1 % (ref 0–2)
BILIRUB SERPL-MCNC: 0.4 MG/DL (ref 0.2–1)
BUN SERPL-MCNC: 11 MG/DL (ref 7–18)
BUN/CREAT SERPL: 16 (ref 12–20)
CALCIUM SERPL-MCNC: 9.4 MG/DL (ref 8.5–10.1)
CHLORIDE SERPL-SCNC: 107 MMOL/L (ref 100–111)
CO2 SERPL-SCNC: 28 MMOL/L (ref 21–32)
CREAT SERPL-MCNC: 0.7 MG/DL (ref 0.6–1.3)
DIFFERENTIAL METHOD BLD: NORMAL
EOSINOPHIL # BLD: 0.3 K/UL (ref 0–0.4)
EOSINOPHIL NFR BLD: 3 % (ref 0–5)
ERYTHROCYTE [DISTWIDTH] IN BLOOD BY AUTOMATED COUNT: 13.3 % (ref 11.6–14.5)
GLOBULIN SER CALC-MCNC: 3.8 G/DL (ref 2–4)
GLUCOSE SERPL-MCNC: 181 MG/DL (ref 74–99)
HCG SERPL QL: NEGATIVE
HCT VFR BLD AUTO: 38.6 % (ref 35–45)
HGB BLD-MCNC: 12.8 G/DL (ref 12–16)
IMM GRANULOCYTES # BLD AUTO: 0 K/UL (ref 0–0.04)
IMM GRANULOCYTES NFR BLD AUTO: 0 % (ref 0–0.5)
LYMPHOCYTES # BLD: 3.4 K/UL (ref 0.9–3.6)
LYMPHOCYTES NFR BLD: 32 % (ref 21–52)
MCH RBC QN AUTO: 27.3 PG (ref 24–34)
MCHC RBC AUTO-ENTMCNC: 33.2 G/DL (ref 31–37)
MCV RBC AUTO: 82.3 FL (ref 78–100)
MONOCYTES # BLD: 0.7 K/UL (ref 0.05–1.2)
MONOCYTES NFR BLD: 6 % (ref 3–10)
NEUTS SEG # BLD: 6.3 K/UL (ref 1.8–8)
NEUTS SEG NFR BLD: 59 % (ref 40–73)
NRBC # BLD: 0 K/UL (ref 0–0.01)
NRBC BLD-RTO: 0 PER 100 WBC
PLATELET # BLD AUTO: 374 K/UL (ref 135–420)
PMV BLD AUTO: 9.2 FL (ref 9.2–11.8)
POTASSIUM SERPL-SCNC: 3.4 MMOL/L (ref 3.5–5.5)
PROT SERPL-MCNC: 7.7 G/DL (ref 6.4–8.2)
RBC # BLD AUTO: 4.69 M/UL (ref 4.2–5.3)
SODIUM SERPL-SCNC: 138 MMOL/L (ref 136–145)
WBC # BLD AUTO: 10.7 K/UL (ref 4.6–13.2)

## 2022-02-05 PROCEDURE — 80053 COMPREHEN METABOLIC PANEL: CPT

## 2022-02-05 PROCEDURE — 93005 ELECTROCARDIOGRAM TRACING: CPT

## 2022-02-05 PROCEDURE — 96361 HYDRATE IV INFUSION ADD-ON: CPT

## 2022-02-05 PROCEDURE — 83735 ASSAY OF MAGNESIUM: CPT

## 2022-02-05 PROCEDURE — 96360 HYDRATION IV INFUSION INIT: CPT

## 2022-02-05 PROCEDURE — 74011250636 HC RX REV CODE- 250/636: Performed by: EMERGENCY MEDICINE

## 2022-02-05 PROCEDURE — 85025 COMPLETE CBC W/AUTO DIFF WBC: CPT

## 2022-02-05 PROCEDURE — 99284 EMERGENCY DEPT VISIT MOD MDM: CPT

## 2022-02-05 PROCEDURE — 84703 CHORIONIC GONADOTROPIN ASSAY: CPT

## 2022-02-05 RX ADMIN — SODIUM CHLORIDE 1000 ML: 900 INJECTION, SOLUTION INTRAVENOUS at 19:40

## 2022-02-05 RX ADMIN — SODIUM CHLORIDE 1000 ML: 900 INJECTION, SOLUTION INTRAVENOUS at 23:12

## 2022-02-05 RX ADMIN — SODIUM CHLORIDE 1000 ML: 900 INJECTION, SOLUTION INTRAVENOUS at 19:44

## 2022-02-06 VITALS
DIASTOLIC BLOOD PRESSURE: 92 MMHG | BODY MASS INDEX: 42.52 KG/M2 | OXYGEN SATURATION: 100 % | HEART RATE: 85 BPM | RESPIRATION RATE: 19 BRPM | SYSTOLIC BLOOD PRESSURE: 108 MMHG | WEIGHT: 240 LBS | TEMPERATURE: 98.2 F | HEIGHT: 63 IN

## 2022-02-06 LAB
APPEARANCE UR: CLEAR
ATRIAL RATE: 105 BPM
BILIRUB UR QL: NEGATIVE
CALCULATED P AXIS, ECG09: 47 DEGREES
CALCULATED R AXIS, ECG10: 72 DEGREES
CALCULATED T AXIS, ECG11: 14 DEGREES
COLOR UR: YELLOW
DIAGNOSIS, 93000: NORMAL
GLUCOSE UR STRIP.AUTO-MCNC: NEGATIVE MG/DL
HGB UR QL STRIP: NEGATIVE
KETONES UR QL STRIP.AUTO: NEGATIVE MG/DL
LEUKOCYTE ESTERASE UR QL STRIP.AUTO: NEGATIVE
MAGNESIUM SERPL-MCNC: 2.1 MG/DL (ref 1.6–2.6)
NITRITE UR QL STRIP.AUTO: NEGATIVE
P-R INTERVAL, ECG05: 130 MS
PH UR STRIP: 5.5 [PH] (ref 5–8)
PROT UR STRIP-MCNC: NEGATIVE MG/DL
Q-T INTERVAL, ECG07: 330 MS
QRS DURATION, ECG06: 76 MS
QTC CALCULATION (BEZET), ECG08: 436 MS
SP GR UR REFRACTOMETRY: 1.02 (ref 1–1.03)
UROBILINOGEN UR QL STRIP.AUTO: 0.2 EU/DL (ref 0.2–1)
VENTRICULAR RATE, ECG03: 105 BPM

## 2022-02-06 PROCEDURE — 81003 URINALYSIS AUTO W/O SCOPE: CPT

## 2022-02-06 NOTE — ED PROVIDER NOTES
Patient is a 59-year-old ED today with headache, elevated heart rate, dizziness, and states that she is dehydrated. She states that she is taking triamterene and started back taking it several days ago. She states that normally she feels this way when she gets dehydrated. She states that she is not currently pregnant even though she had a positive pregnancy test in the fall. She denies any chest pain, shortness of breath, nausea, vomiting or diarrhea. Patient states that she did have sepsis x2 secondary to infected kidney stones. She does have some low back pain and a little bit of dysuria. She denies any fevers or chills at this time.            Past Medical History:   Diagnosis Date    Anxiety     Gestational diabetes     History of PCOS     Left nephrolithiasis 2021       Past Surgical History:   Procedure Laterality Date    HX  SECTION  , ,     HX UROLOGICAL  2021    CYSTOSCOPY,LEFT URETEROSCOPY, LASER,LEFT STENT EXCHANGE retrograde pyleogram; Dr. iLlliam Fox         Family History:   Problem Relation Age of Onset    Cancer Paternal Grandfather         lung- smoker    Cancer Paternal Grandmother         unknown    Cancer Mother         breast- unknown age       Social History     Socioeconomic History    Marital status: SINGLE     Spouse name: Not on file    Number of children: Not on file    Years of education: Not on file    Highest education level: Not on file   Occupational History    Not on file   Tobacco Use    Smoking status: Never Smoker    Smokeless tobacco: Never Used   Substance and Sexual Activity    Alcohol use: Yes     Comment: rarely    Drug use: No    Sexual activity: Yes     Partners: Male     Birth control/protection: Condom   Other Topics Concern    Not on file   Social History Narrative    Not on file     Social Determinants of Health     Financial Resource Strain:     Difficulty of Paying Living Expenses: Not on file   Food Insecurity:  Worried About Running Out of Food in the Last Year: Not on file    Lizette of Food in the Last Year: Not on file   Transportation Needs:     Lack of Transportation (Medical): Not on file    Lack of Transportation (Non-Medical): Not on file   Physical Activity:     Days of Exercise per Week: Not on file    Minutes of Exercise per Session: Not on file   Stress:     Feeling of Stress : Not on file   Social Connections:     Frequency of Communication with Friends and Family: Not on file    Frequency of Social Gatherings with Friends and Family: Not on file    Attends Scientology Services: Not on file    Active Member of [address] [place] [address] [employer] or Organizations: Not on file    Attends Club or Organization Meetings: Not on file    Marital Status: Not on file   Intimate Partner Violence:     Fear of Current or Ex-Partner: Not on file    Emotionally Abused: Not on file    Physically Abused: Not on file    Sexually Abused: Not on file   Housing Stability:     Unable to Pay for Housing in the Last Year: Not on file    Number of [place] in the Last Year: Not on file    Unstable Housing in the Last Year: Not on file         ALLERGIES: Latex, natural rubber and Vicodin [hydrocodone-acetaminophen]    Review of Systems   All other systems reviewed and are negative. Vitals:    02/05/22 1905   BP: (!) 150/98   Pulse: (!) 106   Resp: 18   Temp: 98.2 °F (36.8 °C)   SpO2: 99%   Weight: 108.9 kg (240 lb)   Height: 5' 3\" (1.6 m)            Physical Exam  Vitals and nursing note reviewed. Constitutional:       Appearance: She is obese. HENT:      Head: Normocephalic and atraumatic. Right Ear: External ear normal.      Left Ear: External ear normal.      Nose: Nose normal.      Mouth/Throat:      Mouth: Mucous membranes are moist.      Pharynx: Oropharynx is clear. Eyes:      Conjunctiva/sclera: Conjunctivae normal.      Pupils: Pupils are equal, round, and reactive to light.    Cardiovascular:      Rate and Rhythm: Regular rhythm. Tachycardia present. Pulses: Normal pulses. Heart sounds: Normal heart sounds. Pulmonary:      Effort: Pulmonary effort is normal.      Breath sounds: Normal breath sounds. Abdominal:      General: Abdomen is flat. Bowel sounds are normal.   Musculoskeletal:         General: Normal range of motion. Cervical back: Normal range of motion and neck supple. Skin:     General: Skin is warm and dry. Capillary Refill: Capillary refill takes less than 2 seconds. Neurological:      General: No focal deficit present. Mental Status: She is alert and oriented to person, place, and time. Psychiatric:         Mood and Affect: Mood normal.         Behavior: Behavior normal.         Thought Content: Thought content normal.         Judgment: Judgment normal.        Recent Results (from the past 12 hour(s))   CBC WITH AUTOMATED DIFF    Collection Time: 02/05/22  7:35 PM   Result Value Ref Range    WBC 10.7 4.6 - 13.2 K/uL    RBC 4.69 4.20 - 5.30 M/uL    HGB 12.8 12.0 - 16.0 g/dL    HCT 38.6 35.0 - 45.0 %    MCV 82.3 78.0 - 100.0 FL    MCH 27.3 24.0 - 34.0 PG    MCHC 33.2 31.0 - 37.0 g/dL    RDW 13.3 11.6 - 14.5 %    PLATELET 507 603 - 472 K/uL    MPV 9.2 9.2 - 11.8 FL    NRBC 0.0 0  WBC    ABSOLUTE NRBC 0.00 0.00 - 0.01 K/uL    NEUTROPHILS 59 40 - 73 %    LYMPHOCYTES 32 21 - 52 %    MONOCYTES 6 3 - 10 %    EOSINOPHILS 3 0 - 5 %    BASOPHILS 1 0 - 2 %    IMMATURE GRANULOCYTES 0 0.0 - 0.5 %    ABS. NEUTROPHILS 6.3 1.8 - 8.0 K/UL    ABS. LYMPHOCYTES 3.4 0.9 - 3.6 K/UL    ABS. MONOCYTES 0.7 0.05 - 1.2 K/UL    ABS. EOSINOPHILS 0.3 0.0 - 0.4 K/UL    ABS. BASOPHILS 0.1 0.0 - 0.1 K/UL    ABS. IMM.  GRANS. 0.0 0.00 - 0.04 K/UL    DF AUTOMATED     METABOLIC PANEL, COMPREHENSIVE    Collection Time: 02/05/22  7:35 PM   Result Value Ref Range    Sodium 138 136 - 145 mmol/L    Potassium 3.4 (L) 3.5 - 5.5 mmol/L    Chloride 107 100 - 111 mmol/L    CO2 28 21 - 32 mmol/L    Anion gap 3 3.0 - 18 mmol/L    Glucose 181 (H) 74 - 99 mg/dL    BUN 11 7.0 - 18 MG/DL    Creatinine 0.70 0.6 - 1.3 MG/DL    BUN/Creatinine ratio 16 12 - 20      GFR est AA >60 >60 ml/min/1.73m2    GFR est non-AA >60 >60 ml/min/1.73m2    Calcium 9.4 8.5 - 10.1 MG/DL    Bilirubin, total 0.4 0.2 - 1.0 MG/DL    ALT (SGPT) 25 13 - 56 U/L    AST (SGOT) 10 10 - 38 U/L    Alk. phosphatase 59 45 - 117 U/L    Protein, total 7.7 6.4 - 8.2 g/dL    Albumin 3.9 3.4 - 5.0 g/dL    Globulin 3.8 2.0 - 4.0 g/dL    A-G Ratio 1.0 0.8 - 1.7     HCG QL SERUM    Collection Time: 02/05/22  7:35 PM   Result Value Ref Range    HCG, Ql. Negative NEG     URINALYSIS W/ RFLX MICROSCOPIC    Collection Time: 02/06/22 12:30 AM   Result Value Ref Range    Color YELLOW      Appearance CLEAR      Specific gravity 1.019 1.005 - 1.030      pH (UA) 5.5 5.0 - 8.0      Protein Negative NEG mg/dL    Glucose Negative NEG mg/dL    Ketone Negative NEG mg/dL    Bilirubin Negative NEG      Blood Negative NEG      Urobilinogen 0.2 0.2 - 1.0 EU/dL    Nitrites Negative NEG      Leukocyte Esterase Negative NEG           MDM  Number of Diagnoses or Management Options  Diagnosis management comments: The patient is a 65-year-old woman with past medical history significant for anxiety, PCOS and kidney stones, who presents to the ED today with headache, elevated heart rate, dizziness and feeling dehydrated. She takes triamterene and she just started back. She received 2 L of IV fluid and still having pain yet. I ordered another liter of IV fluid. At this time she is feeling better. Her urine is negative for a urinary tract infection. She will be discharged home and advised to follow-up with her primary care physician in 2 to 3 days. Return precautions have been given.          Procedures

## 2022-02-06 NOTE — ED TRIAGE NOTES
Pt ambulatory to triage without assistance. Patient alert and oriented x 4. Pt reports headache, increased heart rate, and dizziness. Pt states she has been taking the water pill triamterene for weight loss and she believes she is dehydrated.   Pt reports multiple visits for same issue multiple times

## 2022-03-06 ENCOUNTER — APPOINTMENT (OUTPATIENT)
Dept: CT IMAGING | Age: 32
End: 2022-03-06
Attending: EMERGENCY MEDICINE
Payer: MEDICAID

## 2022-03-06 ENCOUNTER — HOSPITAL ENCOUNTER (EMERGENCY)
Age: 32
Discharge: HOME | End: 2022-03-06
Attending: EMERGENCY MEDICINE
Payer: MEDICAID

## 2022-03-06 VITALS
SYSTOLIC BLOOD PRESSURE: 145 MMHG | BODY MASS INDEX: 40.75 KG/M2 | RESPIRATION RATE: 16 BRPM | WEIGHT: 230 LBS | DIASTOLIC BLOOD PRESSURE: 95 MMHG | OXYGEN SATURATION: 99 % | TEMPERATURE: 98.5 F | HEART RATE: 105 BPM | HEIGHT: 63 IN

## 2022-03-06 DIAGNOSIS — K11.20 PAROTITIS: Primary | ICD-10-CM

## 2022-03-06 LAB
ALBUMIN SERPL-MCNC: 3.9 G/DL (ref 3.4–5)
ALBUMIN/GLOB SERPL: 1 {RATIO} (ref 0.8–1.7)
ALP SERPL-CCNC: 47 U/L (ref 45–117)
ALT SERPL-CCNC: 25 U/L (ref 13–56)
ANION GAP SERPL CALC-SCNC: 2 MMOL/L (ref 3–18)
AST SERPL-CCNC: 10 U/L (ref 10–38)
BASOPHILS # BLD: 0 K/UL (ref 0–0.1)
BASOPHILS NFR BLD: 0 % (ref 0–2)
BILIRUB SERPL-MCNC: 0.7 MG/DL (ref 0.2–1)
BUN SERPL-MCNC: 8 MG/DL (ref 7–18)
BUN/CREAT SERPL: 11 (ref 12–20)
CALCIUM SERPL-MCNC: 9.5 MG/DL (ref 8.5–10.1)
CHLORIDE SERPL-SCNC: 107 MMOL/L (ref 100–111)
CO2 SERPL-SCNC: 28 MMOL/L (ref 21–32)
CREAT SERPL-MCNC: 0.7 MG/DL (ref 0.6–1.3)
DIFFERENTIAL METHOD BLD: NORMAL
EOSINOPHIL # BLD: 0.2 K/UL (ref 0–0.4)
EOSINOPHIL NFR BLD: 2 % (ref 0–5)
ERYTHROCYTE [DISTWIDTH] IN BLOOD BY AUTOMATED COUNT: 13 % (ref 11.6–14.5)
GLOBULIN SER CALC-MCNC: 3.9 G/DL (ref 2–4)
GLUCOSE SERPL-MCNC: 108 MG/DL (ref 74–99)
HCG SERPL QL: NEGATIVE
HCT VFR BLD AUTO: 39.5 % (ref 35–45)
HGB BLD-MCNC: 13.1 G/DL (ref 12–16)
IMM GRANULOCYTES # BLD AUTO: 0 K/UL (ref 0–0.04)
IMM GRANULOCYTES NFR BLD AUTO: 0 % (ref 0–0.5)
LYMPHOCYTES # BLD: 2.6 K/UL (ref 0.9–3.6)
LYMPHOCYTES NFR BLD: 36 % (ref 21–52)
MCH RBC QN AUTO: 27.7 PG (ref 24–34)
MCHC RBC AUTO-ENTMCNC: 33.2 G/DL (ref 31–37)
MCV RBC AUTO: 83.5 FL (ref 78–100)
MONOCYTES # BLD: 0.4 K/UL (ref 0.05–1.2)
MONOCYTES NFR BLD: 6 % (ref 3–10)
NEUTS SEG # BLD: 4.2 K/UL (ref 1.8–8)
NEUTS SEG NFR BLD: 56 % (ref 40–73)
NRBC # BLD: 0 K/UL (ref 0–0.01)
NRBC BLD-RTO: 0 PER 100 WBC
PLATELET # BLD AUTO: 330 K/UL (ref 135–420)
PMV BLD AUTO: 9.3 FL (ref 9.2–11.8)
POTASSIUM SERPL-SCNC: 3.8 MMOL/L (ref 3.5–5.5)
PROT SERPL-MCNC: 7.8 G/DL (ref 6.4–8.2)
RBC # BLD AUTO: 4.73 M/UL (ref 4.2–5.3)
SODIUM SERPL-SCNC: 137 MMOL/L (ref 136–145)
WBC # BLD AUTO: 7.4 K/UL (ref 4.6–13.2)

## 2022-03-06 PROCEDURE — 84703 CHORIONIC GONADOTROPIN ASSAY: CPT

## 2022-03-06 PROCEDURE — 85025 COMPLETE CBC W/AUTO DIFF WBC: CPT

## 2022-03-06 PROCEDURE — 74011000636 HC RX REV CODE- 636: Performed by: EMERGENCY MEDICINE

## 2022-03-06 PROCEDURE — 70491 CT SOFT TISSUE NECK W/DYE: CPT

## 2022-03-06 PROCEDURE — 96374 THER/PROPH/DIAG INJ IV PUSH: CPT

## 2022-03-06 PROCEDURE — 80053 COMPREHEN METABOLIC PANEL: CPT

## 2022-03-06 PROCEDURE — 74011250636 HC RX REV CODE- 250/636: Performed by: EMERGENCY MEDICINE

## 2022-03-06 PROCEDURE — 99285 EMERGENCY DEPT VISIT HI MDM: CPT

## 2022-03-06 RX ORDER — IBUPROFEN 800 MG/1
800 TABLET ORAL EVERY 8 HOURS
Qty: 15 TABLET | Refills: 0 | Status: SHIPPED | OUTPATIENT
Start: 2022-03-06 | End: 2022-03-11

## 2022-03-06 RX ORDER — DEXAMETHASONE SODIUM PHOSPHATE 4 MG/ML
10 INJECTION, SOLUTION INTRA-ARTICULAR; INTRALESIONAL; INTRAMUSCULAR; INTRAVENOUS; SOFT TISSUE ONCE
Status: COMPLETED | OUTPATIENT
Start: 2022-03-06 | End: 2022-03-06

## 2022-03-06 RX ADMIN — DEXAMETHASONE SODIUM PHOSPHATE 10 MG: 4 INJECTION, SOLUTION INTRAMUSCULAR; INTRAVENOUS at 12:15

## 2022-03-06 RX ADMIN — IOPAMIDOL 80 ML: 612 INJECTION, SOLUTION INTRAVENOUS at 14:05

## 2022-03-06 RX ADMIN — SODIUM CHLORIDE 1000 ML: 9 INJECTION, SOLUTION INTRAVENOUS at 12:16

## 2022-03-06 NOTE — ED TRIAGE NOTES
Pt came to ER for Left sided facial swelling. Airway patent. Pt speaking in complete sentences. Golf ball size swollen area in front of ear on left side.

## 2022-03-06 NOTE — ED PROVIDER NOTES
EMERGENCY DEPARTMENT HISTORY AND PHYSICAL EXAM    Date: 3/6/2022  Patient Name: Kamron Banda    History of Presenting Illness     Chief Complaint   Patient presents with    Facial Swelling         History Provided By: Patient      Additional History (Context): Kamron Banda is a 27 y.o. female with No significant past medical history who presents with complaint of left-sided facial swelling going into her neck after eating today. She said it started to begin to be painful yesterday she noted but really accelerated today. Denies fever drooling trismus or dental pain. Denies possibility of pregnancy. PCP: Cristy Lake NP    Current Outpatient Medications   Medication Sig Dispense Refill    ibuprofen (MOTRIN) 800 mg tablet Take 1 Tablet by mouth every eight (8) hours for 5 days. 15 Tablet 0    lidocaine (LIDODERM) 5 % Apply 1 patch as directed for 12 hours every 24 hours (12 hours on, 12 hours off)      naproxen (NAPROSYN) 500 mg tablet Take 500 mg by mouth two (2) times a day.  cyclobenzaprine (FLEXERIL) 10 mg tablet Take  by mouth three (3) times daily as needed for Muscle Spasm(s).  butalbital-acetaminophen-caffeine (FIORICET, ESGIC) -40 mg per tablet Take 1 Tablet by mouth.  multivitamin (ONE A DAY) tablet Take 1 Tablet by mouth daily.  L.acid,gas,plan,rhm/B.ani/cran (UP4 PROBIOTICS WOMEN'S PO) Take  by mouth.  ibuprofen (MOTRIN PO) Take  by mouth.          Past History     Past Medical History:  Past Medical History:   Diagnosis Date    Anxiety     Gestational diabetes     History of PCOS     Left nephrolithiasis 2021       Past Surgical History:  Past Surgical History:   Procedure Laterality Date    HX  SECTION  , ,     HX UROLOGICAL  2021    CYSTOSCOPY,LEFT URETEROSCOPY, LASER,LEFT STENT EXCHANGE retrograde pyleogram; Dr. Johana William       Family History:  Family History   Problem Relation Age of Onset    Cancer Paternal Grandfather         lung- smoker    Cancer Paternal Grandmother         unknown    Cancer Mother         breast- unknown age       Social History:  Social History     Tobacco Use    Smoking status: Never Smoker    Smokeless tobacco: Never Used   Substance Use Topics    Alcohol use: Yes     Comment: rarely    Drug use: No       Allergies: Allergies   Allergen Reactions    Latex, Natural Rubber Itching    Vicodin [Hydrocodone-Acetaminophen] Anxiety         Review of Systems   Review of Systems   Constitutional: Negative for fever. HENT: Positive for facial swelling. Negative for dental problem, drooling and trouble swallowing. Eyes: Negative. Respiratory: Negative. Cardiovascular: Negative. Gastrointestinal: Negative. Endocrine: Negative. Genitourinary: Negative. Musculoskeletal: Negative. Allergic/Immunologic: Negative. Neurological: Negative. Hematological: Does not bruise/bleed easily. Psychiatric/Behavioral: Negative. All Other Systems Negative  Physical Exam     Vitals:    03/06/22 1158 03/06/22 1200   BP: (!) 145/95    Pulse: (!) 105    Resp: 16    Temp: 98.5 °F (36.9 °C)    SpO2: 99%    Weight:  104.3 kg (230 lb)   Height:  5' 3\" (1.6 m)     Physical Exam  Vitals and nursing note reviewed. Constitutional:       Appearance: She is well-developed. HENT:      Head: Normocephalic and atraumatic. Right Ear: Tympanic membrane and external ear normal.      Left Ear: Tympanic membrane and external ear normal.      Mouth/Throat:      Comments: Dental: No dental tenderness to palpation. Gingiva not visibly enlarged there is no palpable fluctuance. Eyes:      Pupils: Pupils are equal, round, and reactive to light. Comments: No tonsillar exudates seen. Neck:      Thyroid: No thyromegaly. Vascular: No JVD. Trachea: No tracheal deviation. Comments:  In her left neck superiorly there is tenderness and swelling at the base of her mandibular angle. Cardiovascular:      Rate and Rhythm: Normal rate and regular rhythm. Heart sounds: Normal heart sounds. No murmur heard. No friction rub. No gallop. Pulmonary:      Effort: Pulmonary effort is normal. No respiratory distress. Breath sounds: Normal breath sounds. No stridor. No wheezing or rales. Chest:      Chest wall: No tenderness. Abdominal:      General: There is no distension. Palpations: Abdomen is soft. There is no mass. Tenderness: There is no abdominal tenderness. There is no guarding or rebound. Musculoskeletal:      Cervical back: Tenderness present. Lymphadenopathy:      Cervical: No cervical adenopathy. Skin:     General: Skin is warm and dry. Coloration: Skin is not pale. Findings: No erythema or rash. Neurological:      Mental Status: She is alert and oriented to person, place, and time. Psychiatric:         Behavior: Behavior normal.         Thought Content: Thought content normal.          Diagnostic Study Results     Labs -     Recent Results (from the past 12 hour(s))   CBC WITH AUTOMATED DIFF    Collection Time: 03/06/22 12:15 PM   Result Value Ref Range    WBC 7.4 4.6 - 13.2 K/uL    RBC 4.73 4.20 - 5.30 M/uL    HGB 13.1 12.0 - 16.0 g/dL    HCT 39.5 35.0 - 45.0 %    MCV 83.5 78.0 - 100.0 FL    MCH 27.7 24.0 - 34.0 PG    MCHC 33.2 31.0 - 37.0 g/dL    RDW 13.0 11.6 - 14.5 %    PLATELET 799 900 - 138 K/uL    MPV 9.3 9.2 - 11.8 FL    NRBC 0.0 0  WBC    ABSOLUTE NRBC 0.00 0.00 - 0.01 K/uL    NEUTROPHILS 56 40 - 73 %    LYMPHOCYTES 36 21 - 52 %    MONOCYTES 6 3 - 10 %    EOSINOPHILS 2 0 - 5 %    BASOPHILS 0 0 - 2 %    IMMATURE GRANULOCYTES 0 0.0 - 0.5 %    ABS. NEUTROPHILS 4.2 1.8 - 8.0 K/UL    ABS. LYMPHOCYTES 2.6 0.9 - 3.6 K/UL    ABS. MONOCYTES 0.4 0.05 - 1.2 K/UL    ABS. EOSINOPHILS 0.2 0.0 - 0.4 K/UL    ABS. BASOPHILS 0.0 0.0 - 0.1 K/UL    ABS. IMM.  GRANS. 0.0 0.00 - 0.04 K/UL    DF AUTOMATED     METABOLIC PANEL, COMPREHENSIVE Collection Time: 03/06/22 12:15 PM   Result Value Ref Range    Sodium 137 136 - 145 mmol/L    Potassium 3.8 3.5 - 5.5 mmol/L    Chloride 107 100 - 111 mmol/L    CO2 28 21 - 32 mmol/L    Anion gap 2 (L) 3.0 - 18 mmol/L    Glucose 108 (H) 74 - 99 mg/dL    BUN 8 7.0 - 18 MG/DL    Creatinine 0.70 0.6 - 1.3 MG/DL    BUN/Creatinine ratio 11 (L) 12 - 20      GFR est AA >60 >60 ml/min/1.73m2    GFR est non-AA >60 >60 ml/min/1.73m2    Calcium 9.5 8.5 - 10.1 MG/DL    Bilirubin, total 0.7 0.2 - 1.0 MG/DL    ALT (SGPT) 25 13 - 56 U/L    AST (SGOT) 10 10 - 38 U/L    Alk. phosphatase 47 45 - 117 U/L    Protein, total 7.8 6.4 - 8.2 g/dL    Albumin 3.9 3.4 - 5.0 g/dL    Globulin 3.9 2.0 - 4.0 g/dL    A-G Ratio 1.0 0.8 - 1.7     HCG QL SERUM    Collection Time: 03/06/22 12:15 PM   Result Value Ref Range    HCG, Ql. Negative NEG         Radiologic Studies -   CT NECK SOFT TISSUE W CONT   Final Result         1. There is mild stranding about the left parotid gland, suggestive of   nonspecific parotiditis.   -No obvious dilated ducts, fluid collection, or sialolith. CT Results  (Last 48 hours)               03/06/22 1404  CT NECK SOFT TISSUE W CONT Final result    Impression:          1. There is mild stranding about the left parotid gland, suggestive of   nonspecific parotiditis.   -No obvious dilated ducts, fluid collection, or sialolith. Narrative:  CT SOFT TISSUE NECK WITH IV CONTRAST       HISTORY: Left facial/neck swelling. COMPARISON: Correlation with CT head 6/23/2021. TECHNIQUE: Serial axial CT images were obtained of the neck after the uneventful   administration of 100 cc Isovue-300. Sagittal and coronal reformations were   created and reviewed.   All CT scans at this facility are performed using dose   optimization technique as appropriate to a performed exam, to include automated   exposure control, adjustment of the mA and/or kV according to patient's size   (including appropriate matching for site-specific examinations), or use of   iterative reconstruction technique. FINDINGS:                Dentition: No evidence for acute dental disease. Visualized lower brain: No acute finding. Mucosa of the upper aerodigestive tract : unremarkable. Lymph nodes: No adenopathy. Major salivary glands: There is stranding about the left parotid gland. No   intraparotid mass or adenopathy. No dilated ducts, fluid collection, or   sialolith. .       Paranasal sinuses: Tiny mucus retention cyst at the left maxillary sinus. Chronic opacification of posterior left ethmoid air cell. Upper mediastinum : Unremarkable. Thyroid : unremarkable. Visualized lung apices : clear. Bones : No acute osseous finding. Minimal degenerative disc disease with mild   reversal of usual cervical lordosis. .                        CXR Results  (Last 48 hours)    None            Medical Decision Making   I am the first provider for this patient. I reviewed the vital signs, available nursing notes, past medical history, past surgical history, family history and social history. Vital Signs-Reviewed the patient's vital signs. Records Reviewed: Nursing Notes    Procedures:  Procedures    Provider Notes (Medical Decision Making):   Differential: Parotitis, parapharyngeal or PTA, lymphadenopathy      Treat parotitis. Advised OTC hard lemon drop candy. MED RECONCILIATION:  No current facility-administered medications for this encounter. Current Outpatient Medications   Medication Sig    ibuprofen (MOTRIN) 800 mg tablet Take 1 Tablet by mouth every eight (8) hours for 5 days.  lidocaine (LIDODERM) 5 % Apply 1 patch as directed for 12 hours every 24 hours (12 hours on, 12 hours off)    naproxen (NAPROSYN) 500 mg tablet Take 500 mg by mouth two (2) times a day.     cyclobenzaprine (FLEXERIL) 10 mg tablet Take  by mouth three (3) times daily as needed for Muscle Spasm(s).  butalbital-acetaminophen-caffeine (FIORICET, ESGIC) -40 mg per tablet Take 1 Tablet by mouth.  multivitamin (ONE A DAY) tablet Take 1 Tablet by mouth daily.  L.acid,gas,plan,rhm/B.ani/cran (UP4 PROBIOTICS WOMEN'S PO) Take  by mouth.  ibuprofen (MOTRIN PO) Take  by mouth. Disposition:  home    DISCHARGE NOTE:   2:42 PM    Pt has been reexamined. Patient has no new complaints, changes, or physical findings. Care plan outlined and precautions discussed. Results of labs, CT were reviewed with the patient. All medications were reviewed with the patient; will d/c home with ibuprofen. All of pt's questions and concerns were addressed. Patient was instructed and agrees to follow up with pcp, as well as to return to the ED upon further deterioration. Patient is ready to go home. Follow-up Information     Follow up With Specialties Details Why Contact Info    Angy Egan NP Nurse Practitioner Schedule an appointment as soon as possible for a visit in 1 day  [address] [place] [address]  [phone]      [facility] EMERGENCY DEPT Emergency Medicine  If symptoms worsen return immediately [address] [facility]  [phone]          Current Discharge Medication List      START taking these medications    Details   !! ibuprofen (MOTRIN) 800 mg tablet Take 1 Tablet by mouth every eight (8) hours for 5 days. Qty: 15 Tablet, Refills: 0  Start date: 3/6/2022, End date: 3/11/2022       !! - Potential duplicate medications found. Please discuss with provider. CONTINUE these medications which have NOT CHANGED    Details   !! ibuprofen (MOTRIN PO) Take  by mouth. !! - Potential duplicate medications found. Please discuss with provider. Diagnosis     Clinical Impression:   1.  Parotitis

## 2022-03-18 PROBLEM — N30.00 ACUTE CYSTITIS WITHOUT HEMATURIA: Status: ACTIVE | Noted: 2021-05-20

## 2022-03-19 PROBLEM — E66.813 CLASS 3 SEVERE OBESITY DUE TO EXCESS CALORIES WITHOUT SERIOUS COMORBIDITY WITH BODY MASS INDEX (BMI) OF 40.0 TO 44.9 IN ADULT (HCC): Status: ACTIVE | Noted: 2021-05-20

## 2022-03-19 PROBLEM — N39.0 UTI (URINARY TRACT INFECTION): Status: ACTIVE | Noted: 2021-06-20

## 2022-03-19 PROBLEM — R50.9 FEVER: Status: ACTIVE | Noted: 2021-06-20

## 2022-03-19 PROBLEM — E55.9 VITAMIN D DEFICIENCY: Status: ACTIVE | Noted: 2021-05-20

## 2022-03-19 PROBLEM — R73.03 PREDIABETES: Status: ACTIVE | Noted: 2021-09-27

## 2022-03-19 PROBLEM — E66.01 CLASS 3 SEVERE OBESITY DUE TO EXCESS CALORIES WITHOUT SERIOUS COMORBIDITY WITH BODY MASS INDEX (BMI) OF 40.0 TO 44.9 IN ADULT (HCC): Status: ACTIVE | Noted: 2021-05-20

## 2022-03-20 PROBLEM — R00.0 TACHYCARDIA: Status: ACTIVE | Noted: 2021-06-20

## 2022-03-20 PROBLEM — N20.1 URETERAL CALCULUS: Status: ACTIVE | Noted: 2021-05-20

## 2022-03-20 PROBLEM — N20.0 LEFT NEPHROLITHIASIS: Status: ACTIVE | Noted: 2021-05-20

## 2022-11-27 ENCOUNTER — HOSPITAL ENCOUNTER (EMERGENCY)
Age: 32
Discharge: HOME | End: 2022-11-27
Attending: EMERGENCY MEDICINE
Payer: MEDICAID

## 2022-11-27 VITALS
WEIGHT: 215 LBS | TEMPERATURE: 97.6 F | RESPIRATION RATE: 18 BRPM | SYSTOLIC BLOOD PRESSURE: 119 MMHG | OXYGEN SATURATION: 97 % | HEIGHT: 63 IN | HEART RATE: 110 BPM | BODY MASS INDEX: 38.09 KG/M2 | DIASTOLIC BLOOD PRESSURE: 79 MMHG

## 2022-11-27 DIAGNOSIS — B34.9 VIRAL SYNDROME: Primary | ICD-10-CM

## 2022-11-27 LAB
FLUAV RNA SPEC QL NAA+PROBE: NOT DETECTED
FLUBV RNA SPEC QL NAA+PROBE: NOT DETECTED
SARS-COV-2, COV2: NOT DETECTED

## 2022-11-27 PROCEDURE — 99283 EMERGENCY DEPT VISIT LOW MDM: CPT

## 2022-11-27 PROCEDURE — 87636 SARSCOV2 & INF A&B AMP PRB: CPT

## 2022-11-27 PROCEDURE — 74011250637 HC RX REV CODE- 250/637: Performed by: EMERGENCY MEDICINE

## 2022-11-27 RX ORDER — CETIRIZINE HYDROCHLORIDE 10 MG/1
10 TABLET ORAL
Status: COMPLETED | OUTPATIENT
Start: 2022-11-27 | End: 2022-11-27

## 2022-11-27 RX ORDER — BENZONATATE 100 MG/1
100 CAPSULE ORAL
Qty: 21 CAPSULE | Refills: 0 | Status: SHIPPED | OUTPATIENT
Start: 2022-11-27 | End: 2022-12-04

## 2022-11-27 RX ADMIN — CETIRIZINE HYDROCHLORIDE 10 MG: 10 TABLET, FILM COATED ORAL at 08:01

## 2022-11-27 RX ADMIN — PHENYLEPHRINE HYDROCHLORIDE 1 SPRAY: 0.25 SPRAY NASAL at 08:01

## 2022-11-27 NOTE — ED PROVIDER NOTES
54-year-old female past medical history of anxiety presents the emergency department with viral-like allergic symptoms. Patient does have runny nose sore throat cough congestion no fevers or chills. Cough is not productive. Patient is not a smoker. Treated herself with Benadryl at night no improvement. She has had similar symptoms before she has albuterol inhaler and Flonase inhaler at home. Patient does not have a concern of COVID or influenza. Patient has no chest pain no shortness of breath no nausea no vomiting denies pregnancy no other issues expressed.        Past Medical History:   Diagnosis Date    Anxiety     Gestational diabetes     History of PCOS     Left nephrolithiasis 2021       Past Surgical History:   Procedure Laterality Date    HX  SECTION  , ,     HX UROLOGICAL  2021    CYSTOSCOPY,LEFT URETEROSCOPY, LASER,LEFT STENT EXCHANGE retrograde pyleogram; Dr. Lilliam Fox         Family History:   Problem Relation Age of Onset    Cancer Paternal Grandfather         lung- smoker    Cancer Paternal Grandmother         unknown    Cancer Mother         breast- unknown age       Social History     Socioeconomic History    Marital status: SINGLE     Spouse name: Not on file    Number of children: Not on file    Years of education: Not on file    Highest education level: Not on file   Occupational History    Not on file   Tobacco Use    Smoking status: Never    Smokeless tobacco: Never   Substance and Sexual Activity    Alcohol use: Yes     Comment: rarely    Drug use: No    Sexual activity: Yes     Partners: Male     Birth control/protection: Condom   Other Topics Concern    Not on file   Social History Narrative    Not on file     Social Determinants of Health     Financial Resource Strain: Not on file   Food Insecurity: Not on file   Transportation Needs: Not on file   Physical Activity: Not on file   Stress: Not on file   Social Connections: Not on file   Intimate Partner Violence: Not on file   Housing Stability: Not on file         ALLERGIES: Latex, natural rubber and Vicodin [hydrocodone-acetaminophen]    Review of Systems   Constitutional: Negative. HENT:  Positive for congestion, rhinorrhea and sore throat. Negative for tinnitus and trouble swallowing. Respiratory:  Positive for cough. Cardiovascular: Negative. Gastrointestinal: Negative. Genitourinary: Negative. Skin: Negative. Neurological: Negative. Hematological: Negative. All other systems reviewed and are negative. Vitals:    11/27/22 0604 11/27/22 0606   BP:  119/79   Pulse: (!) 110    Resp: 18    Temp: 97.6 °F (36.4 °C)    SpO2: 97%    Weight: 97.5 kg (215 lb)    Height: 5' 3\" (1.6 m)             Physical Exam  Vitals and nursing note reviewed. Constitutional:       General: She is not in acute distress. Appearance: She is well-developed. She is not ill-appearing, toxic-appearing or diaphoretic. HENT:      Head: Normocephalic and atraumatic. Nose: No congestion or rhinorrhea. Mouth/Throat:      Mouth: Mucous membranes are moist. Mucous membranes are pale. No oral lesions. Pharynx: No pharyngeal swelling, oropharyngeal exudate, posterior oropharyngeal erythema or uvula swelling. Tonsils: No tonsillar exudate or tonsillar abscesses. Neck:      Thyroid: No thyromegaly. Cardiovascular:      Rate and Rhythm: Normal rate and regular rhythm. Heart sounds: No murmur heard. No friction rub. No gallop. Pulmonary:      Effort: Pulmonary effort is normal. No respiratory distress. Breath sounds: Normal breath sounds. No stridor. No wheezing, rhonchi or rales. Chest:      Chest wall: No tenderness. Abdominal:      General: There is no distension. Palpations: Abdomen is soft. There is no mass. Tenderness: There is no abdominal tenderness. There is no guarding or rebound. Hernia: No hernia is present.    Musculoskeletal:      Cervical back: Normal range of motion and neck supple. Lymphadenopathy:      Cervical: No cervical adenopathy. Skin:     General: Skin is warm. Capillary Refill: Capillary refill takes less than 2 seconds. Coloration: Skin is not pale. Findings: No erythema or rash. Neurological:      General: No focal deficit present. Mental Status: She is alert and oriented to person, place, and time. Psychiatric:         Mood and Affect: Mood normal.         Behavior: Behavior normal.        MDM  Number of Diagnoses or Management Options  Diagnosis management comments: 59-year-old female presents to the emergency department with viral allergic symptoms. We will do symptomatic over-the-counter relief and add Tessalon Perles. Will discharge home patient is nontoxic. Differential diagnosis viral syndrome pharyngitis cough pneumonia.     Risk of Complications, Morbidity, and/or Mortality  Presenting problems: low  Diagnostic procedures: low  Management options: low    Patient Progress  Patient progress: improved         Procedures

## 2022-11-27 NOTE — ED TRIAGE NOTES
Patient reports cough, sore throat and \"chest on fire\" x 1 week. Patient has been using Dayquil, Nyquil, and mucinex x1 week. Last dose was at 2100 yesterday.

## 2023-04-12 ENCOUNTER — HOSPITAL ENCOUNTER (EMERGENCY)
Facility: HOSPITAL | Age: 33
Discharge: HOME | End: 2023-04-12
Attending: EMERGENCY MEDICINE
Payer: MEDICAID

## 2023-04-12 ENCOUNTER — APPOINTMENT (OUTPATIENT)
Facility: HOSPITAL | Age: 33
End: 2023-04-12
Payer: MEDICAID

## 2023-04-12 VITALS
HEART RATE: 107 BPM | RESPIRATION RATE: 18 BRPM | WEIGHT: 215 LBS | DIASTOLIC BLOOD PRESSURE: 89 MMHG | TEMPERATURE: 97.9 F | OXYGEN SATURATION: 96 % | HEIGHT: 63 IN | SYSTOLIC BLOOD PRESSURE: 130 MMHG | BODY MASS INDEX: 38.09 KG/M2

## 2023-04-12 DIAGNOSIS — S61.211A LACERATION OF LEFT INDEX FINGER WITHOUT FOREIGN BODY, NAIL DAMAGE STATUS UNSPECIFIED, INITIAL ENCOUNTER: Primary | ICD-10-CM

## 2023-04-12 PROCEDURE — 73140 X-RAY EXAM OF FINGER(S): CPT

## 2023-04-12 PROCEDURE — 99283 EMERGENCY DEPT VISIT LOW MDM: CPT

## 2023-04-12 PROCEDURE — 12001 RPR S/N/AX/GEN/TRNK 2.5CM/<: CPT

## 2023-04-12 ASSESSMENT — ENCOUNTER SYMPTOMS
WHEEZING: 0
SHORTNESS OF BREATH: 0
EYE DISCHARGE: 0
SORE THROAT: 0
DIARRHEA: 0
NAUSEA: 0
ABDOMINAL DISTENTION: 0
VOMITING: 0

## 2023-04-12 ASSESSMENT — PAIN SCALES - GENERAL: PAINLEVEL_OUTOF10: 7

## 2023-04-12 ASSESSMENT — PAIN - FUNCTIONAL ASSESSMENT: PAIN_FUNCTIONAL_ASSESSMENT: 0-10

## 2023-04-12 NOTE — ED PROVIDER NOTES
often unanticipated grammatical, syntax, homophones, and other interpretive errors are inadvertently transcribed by the computer software. Please disregard these errors. Please excuse any errors that have escaped final proofreading. \"     Denilson Campo  04/12/23 1918

## 2024-06-27 ENCOUNTER — APPOINTMENT (OUTPATIENT)
Facility: HOSPITAL | Age: 34
End: 2024-06-27
Payer: MEDICAID

## 2024-06-27 ENCOUNTER — HOSPITAL ENCOUNTER (EMERGENCY)
Facility: HOSPITAL | Age: 34
Discharge: HOME | End: 2024-06-27
Attending: STUDENT IN AN ORGANIZED HEALTH CARE EDUCATION/TRAINING PROGRAM
Payer: MEDICAID

## 2024-06-27 VITALS
DIASTOLIC BLOOD PRESSURE: 98 MMHG | SYSTOLIC BLOOD PRESSURE: 131 MMHG | RESPIRATION RATE: 18 BRPM | TEMPERATURE: 98.6 F | HEIGHT: 63 IN | OXYGEN SATURATION: 100 % | HEART RATE: 94 BPM | BODY MASS INDEX: 38.98 KG/M2 | WEIGHT: 220 LBS

## 2024-06-27 DIAGNOSIS — T50.905A MEDICATION SIDE EFFECT, INITIAL ENCOUNTER: Primary | ICD-10-CM

## 2024-06-27 LAB
ALBUMIN/GLOB SERPL: 1.2 (ref 0.8–1.7)
ALP SERPL-CCNC: 55 U/L (ref 45–117)
ALT SERPL-CCNC: 41 U/L (ref 13–56)
ANION GAP SERPL CALC-SCNC: 9 MMOL/L (ref 3–18)
AST SERPL-CCNC: 20 U/L (ref 10–38)
BASOPHILS # BLD: 0 K/UL (ref 0–0.1)
BASOPHILS NFR BLD: 0 % (ref 0–2)
BILIRUB SERPL-MCNC: 0.4 MG/DL (ref 0.2–1)
BUN SERPL-MCNC: 13 MG/DL (ref 7–18)
BUN/CREAT SERPL: 18 (ref 12–20)
CALCIUM SERPL-MCNC: 9.6 MG/DL (ref 8.5–10.1)
CHLORIDE SERPL-SCNC: 99 MMOL/L (ref 100–111)
CO2 SERPL-SCNC: 28 MMOL/L (ref 21–32)
CREAT SERPL-MCNC: 0.72 MG/DL (ref 0.6–1.3)
D DIMER PPP FEU-MCNC: 0.36 UG/ML(FEU)
DIFFERENTIAL METHOD BLD: ABNORMAL
EOSINOPHIL # BLD: 0.3 K/UL (ref 0–0.4)
EOSINOPHIL NFR BLD: 2 % (ref 0–5)
ERYTHROCYTE [DISTWIDTH] IN BLOOD BY AUTOMATED COUNT: 11.9 % (ref 11.6–14.5)
GLOBULIN SER CALC-MCNC: 3.5 G/DL (ref 2–4)
GLUCOSE SERPL-MCNC: 90 MG/DL (ref 74–99)
HCT VFR BLD AUTO: 40.1 % (ref 35–45)
HGB BLD-MCNC: 14 G/DL (ref 12–16)
IMM GRANULOCYTES # BLD AUTO: 0 K/UL (ref 0–0.04)
IMM GRANULOCYTES NFR BLD AUTO: 0 % (ref 0–0.5)
LYMPHOCYTES # BLD: 4 K/UL (ref 0.9–3.6)
LYMPHOCYTES NFR BLD: 37 % (ref 21–52)
MAGNESIUM SERPL-MCNC: 1.9 MG/DL (ref 1.6–2.6)
MCH RBC QN AUTO: 29.2 PG (ref 24–34)
MCHC RBC AUTO-ENTMCNC: 34.9 G/DL (ref 31–37)
MCV RBC AUTO: 83.7 FL (ref 78–100)
MONOCYTES # BLD: 0.6 K/UL (ref 0.05–1.2)
MONOCYTES NFR BLD: 5 % (ref 3–10)
NEUTS SEG # BLD: 5.8 K/UL (ref 1.8–8)
NEUTS SEG NFR BLD: 54 % (ref 40–73)
NRBC # BLD: 0 K/UL (ref 0–0.01)
NRBC BLD-RTO: 0 PER 100 WBC
PLATELET # BLD AUTO: 350 K/UL (ref 135–420)
PMV BLD AUTO: 9.5 FL (ref 9.2–11.8)
POTASSIUM SERPL-SCNC: 3.4 MMOL/L (ref 3.5–5.5)
PROT SERPL-MCNC: 7.8 G/DL (ref 6.4–8.2)
RBC # BLD AUTO: 4.79 M/UL (ref 4.2–5.3)
SODIUM SERPL-SCNC: 136 MMOL/L (ref 136–145)
TROPONIN I SERPL HS-MCNC: <3 NG/L (ref 0–54)
WBC # BLD AUTO: 10.7 K/UL (ref 4.6–13.2)

## 2024-06-27 PROCEDURE — 93005 ELECTROCARDIOGRAM TRACING: CPT | Performed by: STUDENT IN AN ORGANIZED HEALTH CARE EDUCATION/TRAINING PROGRAM

## 2024-06-27 PROCEDURE — 80053 COMPREHEN METABOLIC PANEL: CPT

## 2024-06-27 PROCEDURE — 96365 THER/PROPH/DIAG IV INF INIT: CPT

## 2024-06-27 PROCEDURE — 96372 THER/PROPH/DIAG INJ SC/IM: CPT

## 2024-06-27 PROCEDURE — 6370000000 HC RX 637 (ALT 250 FOR IP)

## 2024-06-27 PROCEDURE — 85379 FIBRIN DEGRADATION QUANT: CPT

## 2024-06-27 PROCEDURE — 85025 COMPLETE CBC W/AUTO DIFF WBC: CPT

## 2024-06-27 PROCEDURE — 83735 ASSAY OF MAGNESIUM: CPT

## 2024-06-27 PROCEDURE — 93010 ELECTROCARDIOGRAM REPORT: CPT | Performed by: INTERNAL MEDICINE

## 2024-06-27 PROCEDURE — 84484 ASSAY OF TROPONIN QUANT: CPT

## 2024-06-27 PROCEDURE — 99285 EMERGENCY DEPT VISIT HI MDM: CPT

## 2024-06-27 PROCEDURE — 71046 X-RAY EXAM CHEST 2 VIEWS: CPT

## 2024-06-27 PROCEDURE — 94761 N-INVAS EAR/PLS OXIMETRY MLT: CPT

## 2024-06-27 PROCEDURE — 6360000002 HC RX W HCPCS

## 2024-06-27 PROCEDURE — 96366 THER/PROPH/DIAG IV INF ADDON: CPT

## 2024-06-27 RX ORDER — KETOROLAC TROMETHAMINE 15 MG/ML
15 INJECTION, SOLUTION INTRAMUSCULAR; INTRAVENOUS ONCE
Status: COMPLETED | OUTPATIENT
Start: 2024-06-27 | End: 2024-06-27

## 2024-06-27 RX ORDER — DIPHENHYDRAMINE HCL 25 MG
25 CAPSULE ORAL
Status: COMPLETED | OUTPATIENT
Start: 2024-06-27 | End: 2024-06-27

## 2024-06-27 RX ORDER — PROCHLORPERAZINE MALEATE 5 MG/1
5 TABLET ORAL
Status: COMPLETED | OUTPATIENT
Start: 2024-06-27 | End: 2024-06-27

## 2024-06-27 RX ORDER — MAGNESIUM SULFATE IN WATER 40 MG/ML
2000 INJECTION, SOLUTION INTRAVENOUS
Status: COMPLETED | OUTPATIENT
Start: 2024-06-27 | End: 2024-06-27

## 2024-06-27 RX ORDER — POTASSIUM CHLORIDE 20 MEQ/1
20 TABLET, EXTENDED RELEASE ORAL
Status: COMPLETED | OUTPATIENT
Start: 2024-06-27 | End: 2024-06-27

## 2024-06-27 RX ADMIN — MAGNESIUM SULFATE HEPTAHYDRATE 2000 MG: 40 INJECTION, SOLUTION INTRAVENOUS at 19:17

## 2024-06-27 RX ADMIN — DIPHENHYDRAMINE HYDROCHLORIDE 25 MG: 25 CAPSULE ORAL at 17:53

## 2024-06-27 RX ADMIN — KETOROLAC TROMETHAMINE 15 MG: 15 INJECTION, SOLUTION INTRAMUSCULAR; INTRAVENOUS at 17:54

## 2024-06-27 RX ADMIN — PROCHLORPERAZINE MALEATE 5 MG: 5 TABLET ORAL at 17:53

## 2024-06-27 RX ADMIN — POTASSIUM CHLORIDE 20 MEQ: 1500 TABLET, EXTENDED RELEASE ORAL at 19:12

## 2024-06-27 ASSESSMENT — PAIN SCALES - GENERAL: PAINLEVEL_OUTOF10: 10

## 2024-06-27 ASSESSMENT — PAIN - FUNCTIONAL ASSESSMENT: PAIN_FUNCTIONAL_ASSESSMENT: 0-10

## 2024-06-27 ASSESSMENT — PAIN DESCRIPTION - LOCATION: LOCATION: HEAD;CHEST

## 2024-06-27 NOTE — ED TRIAGE NOTES
Patient presented to the Emergency Dept with a complaint of sharp pain to head and chest for 1 week.    Patient rates pain 10/10 on pain scale, patient additionally states being on new weight loss pills     Patient alert and oriented x 4, patient breathes freely on room air in nil cardiopulmonary distress

## 2024-06-27 NOTE — ED NOTES
VW)    CBC with Auto Differential    Comprehensive Metabolic Panel    Magnesium    Troponin    D-Dimer, Quantitative    EKG 12 Lead          ED Course:      Patient is a 32 yo female with pmh prediabetes, obesity, migraines presenting with chest pain and HA x 1 week. Of note, is taking weight loss medications. She presents to the ED tachycardic. EKG revealed sinus tachycardia rate 116. Wells score 1.5 for tachycardia, so D dimer was ordered rather than CTA chest.     Workup to rule out emergent causes include:  CBC unremarkable  CMP K 3.4 hypokalemic-- 20mEq of K was given  Magnesium 1.9---Mag 2g IV was given for HA relief  CXR negative  Troponin <3  D dimer 0.36, with Wells score 1.5, so likely not having a PE. No CTA chest needed at this time.     Patient likely not having MI since Troponins were flat and low, and EKG did not show ST elevations/depressions.     For HA relief, CN intact, low suspicion for hematoma/hemorrhage in brain, so was given compazine, benadryl, and toradol. After magnesium levels obtained, was given dose of magnesium.     She was discharged with ibuprofen 800mg tid prn, and instructions to stop the phentermine and triamterine. She may continue taking metformin. She is to follow up with her PCP regarding weight loss medications and metformin.       Procedures:  Procedures      Rhythm interpretation from monitor: not on monitor      Social Determinants of Health: none       Supplemental Historians include: self       Documentation/Prior Results Review:  Old medical records.      Discussion of Mangement with other Physicians, [facility] or Appropriate Source:  Dr. James Harmon        Diagnosis and Disposition     CLINICAL IMPRESSION:  1. Medication side effect, initial encounter         Medication List        ASK your doctor about these medications      butalbital-acetaminophen-caffeine -40 MG per tablet  Commonly known as: FIORICET, ESGIC     cyclobenzaprine 10 MG tablet  Commonly known as:

## 2024-06-28 LAB
EKG ATRIAL RATE: 116 BPM
EKG DIAGNOSIS: NORMAL
EKG P AXIS: 49 DEGREES
EKG P-R INTERVAL: 136 MS
EKG Q-T INTERVAL: 334 MS
EKG QRS DURATION: 78 MS
EKG QTC CALCULATION (BAZETT): 464 MS
EKG R AXIS: 83 DEGREES
EKG T AXIS: 18 DEGREES
EKG VENTRICULAR RATE: 116 BPM

## 2024-06-28 NOTE — DISCHARGE INSTRUCTIONS
Please stop taking phentermine, triamterene for weight loss as it led to elevated heart rate and headache. Continue to stay hydrated with at least 8 bottles of water a day, and more if you are outdoors or doing any strenuous activities. Follow up with your PCP regarding today's visit. If symptoms continue to worsen despite not taking these medications within the next few days, please return to the ED for further evaluation.

## 2024-07-26 ENCOUNTER — OFFICE VISIT (OUTPATIENT)
Facility: CLINIC | Age: 34
End: 2024-07-26

## 2024-07-26 VITALS
DIASTOLIC BLOOD PRESSURE: 78 MMHG | OXYGEN SATURATION: 97 % | WEIGHT: 227 LBS | BODY MASS INDEX: 40.22 KG/M2 | SYSTOLIC BLOOD PRESSURE: 114 MMHG | HEIGHT: 63 IN | TEMPERATURE: 98.7 F | RESPIRATION RATE: 16 BRPM | HEART RATE: 88 BPM

## 2024-07-26 DIAGNOSIS — Z00.00 ENCOUNTER FOR MEDICAL EXAMINATION TO ESTABLISH CARE: ICD-10-CM

## 2024-07-26 DIAGNOSIS — R73.03 PREDIABETES: ICD-10-CM

## 2024-07-26 DIAGNOSIS — E78.1 HYPERTRIGLYCERIDEMIA: ICD-10-CM

## 2024-07-26 DIAGNOSIS — G89.29 CHRONIC NONINTRACTABLE HEADACHE, UNSPECIFIED HEADACHE TYPE: ICD-10-CM

## 2024-07-26 DIAGNOSIS — E87.6 HYPOKALEMIA: Primary | ICD-10-CM

## 2024-07-26 DIAGNOSIS — R51.9 CHRONIC NONINTRACTABLE HEADACHE, UNSPECIFIED HEADACHE TYPE: ICD-10-CM

## 2024-07-26 DIAGNOSIS — Z11.59 NEED FOR HEPATITIS B SCREENING TEST: ICD-10-CM

## 2024-07-26 DIAGNOSIS — Z11.59 ENCOUNTER FOR HEPATITIS C SCREENING TEST FOR LOW RISK PATIENT: ICD-10-CM

## 2024-07-26 DIAGNOSIS — E55.9 VITAMIN D DEFICIENCY: ICD-10-CM

## 2024-07-26 DIAGNOSIS — E66.01 CLASS 3 SEVERE OBESITY WITH SERIOUS COMORBIDITY AND BODY MASS INDEX (BMI) OF 40.0 TO 44.9 IN ADULT, UNSPECIFIED OBESITY TYPE (HCC): ICD-10-CM

## 2024-07-26 DIAGNOSIS — Z13.29 SCREENING FOR ENDOCRINE DISORDER: ICD-10-CM

## 2024-07-26 RX ORDER — TRIAMTERENE AND HYDROCHLOROTHIAZIDE 37.5; 25 MG/1; MG/1
1 TABLET ORAL DAILY
COMMUNITY

## 2024-07-26 RX ORDER — M-VIT,TX,IRON,MINS/CALC/FOLIC 27MG-0.4MG
1 TABLET ORAL DAILY
COMMUNITY

## 2024-07-26 RX ORDER — PHENTERMINE HYDROCHLORIDE 37.5 MG/1
37.5 TABLET ORAL
COMMUNITY

## 2024-07-26 RX ORDER — METFORMIN HYDROCHLORIDE 500 MG/1
500 TABLET, EXTENDED RELEASE ORAL
COMMUNITY

## 2024-07-26 ASSESSMENT — ENCOUNTER SYMPTOMS
BLOOD IN STOOL: 0
NAUSEA: 0
WHEEZING: 0
RHINORRHEA: 0
COUGH: 0
SHORTNESS OF BREATH: 0
ABDOMINAL PAIN: 0
VOMITING: 0
CHEST TIGHTNESS: 0
DIARRHEA: 0
BACK PAIN: 0

## 2024-07-26 NOTE — PROGRESS NOTES
\"Have you been to the ER, urgent care clinic since your last visit?  Hospitalized since your last visit?\"    YES - When: approximately 1 months ago.  Where and Why: [facility] Heart pain/Magnetism /Pos situm low.    “Have you seen or consulted any other health care providers outside of [facility] since your last visit?”    NO     “Have you had a pap smear?”    NO    No cervical cancer screening on file             Click Here for Release of Records Request

## 2024-07-26 NOTE — PROGRESS NOTES
Jalyn Mujica is a 33 y.o. female presenting today for Establish Care  .     Chief Complaint   Patient presents with    Establish Care       HPI:  Jalyn Mujica presents to the office today to establish care.    Patient has a past medical history of prediabetes, migraines, obesity.    Patient is following with an obesity clinic.  She is prescribed metformin, phentermine, triamterene/HCTZ.  She denies a history of hypertension.  States that she is taking the Maxide for weight loss purposes.  She was also started on omega-3 supplements.  Reports her initial weight was 250 pounds approximately 2 months ago.  Today she is 227 pounds    She recently presented to the ED on 6/27/2024 with chest pain and headache.  Found to be tachycardic.  She was noted to have potassium 3.4-repleted.  She was given Compazine, Benadryl and Toradol, IV magnesium with improvement.  She was discharged on ibuprofen and advised to stop taking phentermine that she was on for weight loss due to concern of side effect.  Patient feels like she may have been dehydrated.    Migraines: Patient reports a history of chronic headaches.  Recently aside from when she went to the ED-her headaches have been better controlled.  States they are 5/10 in severity.  She takes Excedrin Migraine with relief.  Not disabling.  Not associate with any nausea or vomiting.    She has continued taking phentermine.  Reports no other side effects.  Denies dizziness, lightheadedness, chest pain.    Prediabetes: Last A1c was 5.7%    Vitamin D deficiency: Last level was 19 in 4/2023      Review of Systems   Constitutional:  Negative for activity change, appetite change, chills, diaphoresis, fatigue, fever and unexpected weight change.   HENT:  Negative for congestion, nosebleeds, postnasal drip and rhinorrhea.    Respiratory:  Negative for cough, chest tightness, shortness of breath and wheezing.    Cardiovascular:  Negative for chest pain and leg swelling.

## 2024-08-14 ENCOUNTER — TELEPHONE (OUTPATIENT)
Facility: CLINIC | Age: 34
End: 2024-08-14

## 2025-07-10 ENCOUNTER — HOSPITAL ENCOUNTER (EMERGENCY)
Facility: HOSPITAL | Age: 35
Discharge: HOME | End: 2025-07-10
Payer: COMMERCIAL

## 2025-07-10 VITALS
HEIGHT: 63 IN | TEMPERATURE: 98.1 F | BODY MASS INDEX: 40.04 KG/M2 | HEART RATE: 95 BPM | OXYGEN SATURATION: 97 % | DIASTOLIC BLOOD PRESSURE: 82 MMHG | RESPIRATION RATE: 16 BRPM | SYSTOLIC BLOOD PRESSURE: 131 MMHG | WEIGHT: 226 LBS

## 2025-07-10 DIAGNOSIS — J06.9 ACUTE UPPER RESPIRATORY INFECTION: Primary | ICD-10-CM

## 2025-07-10 LAB
FLUAV RNA SPEC QL NAA+PROBE: NOT DETECTED
FLUBV RNA SPEC QL NAA+PROBE: NOT DETECTED
S PYO DNA THROAT QL NAA+PROBE: NOT DETECTED
SARS-COV-2 RNA RESP QL NAA+PROBE: NOT DETECTED
SOURCE: NORMAL

## 2025-07-10 PROCEDURE — 87651 STREP A DNA AMP PROBE: CPT

## 2025-07-10 PROCEDURE — 87636 SARSCOV2 & INF A&B AMP PRB: CPT

## 2025-07-10 PROCEDURE — 99283 EMERGENCY DEPT VISIT LOW MDM: CPT

## 2025-07-10 RX ORDER — ACETAMINOPHEN 500 MG
1000 TABLET ORAL EVERY 6 HOURS PRN
Qty: 56 TABLET | Refills: 0 | Status: SHIPPED | OUTPATIENT
Start: 2025-07-10 | End: 2025-07-17

## 2025-07-10 RX ORDER — FLUTICASONE PROPIONATE 50 MCG
1 SPRAY, SUSPENSION (ML) NASAL DAILY
Qty: 32 G | Refills: 0 | Status: SHIPPED | OUTPATIENT
Start: 2025-07-10

## 2025-07-10 RX ORDER — BENZONATATE 100 MG/1
100 CAPSULE ORAL 2 TIMES DAILY PRN
Qty: 14 CAPSULE | Refills: 0 | Status: SHIPPED | OUTPATIENT
Start: 2025-07-10 | End: 2025-07-17

## 2025-07-10 ASSESSMENT — PAIN DESCRIPTION - DESCRIPTORS: DESCRIPTORS: ACHING

## 2025-07-10 ASSESSMENT — PAIN - FUNCTIONAL ASSESSMENT: PAIN_FUNCTIONAL_ASSESSMENT: 0-10

## 2025-07-10 ASSESSMENT — PAIN DESCRIPTION - LOCATION: LOCATION: GENERALIZED

## 2025-07-10 ASSESSMENT — PAIN SCALES - GENERAL: PAINLEVEL_OUTOF10: 6

## 2025-07-10 NOTE — ED TRIAGE NOTES
Pt presents to ED for cough, congestion, body aches, sore throat, and fever since Monday.     Last dose of ibuprofen 1.5 hours ago.

## 2025-07-10 NOTE — ED PROVIDER NOTES
Cervical: No cervical adenopathy.   Skin:     General: Skin is warm.      Findings: No rash.   Neurological:      General: No focal deficit present.      Mental Status: She is alert and oriented to person, place, and time.      Cranial Nerves: No cranial nerve deficit.      Sensory: No sensory deficit.      Motor: No weakness.         Diagnostic Study Results     Labs -     Recent Results (from the past 12 hours)   COVID-19 & Influenza Combo    Collection Time: 07/10/25  8:45 AM    Specimen: Nasopharyngeal   Result Value Ref Range    Source Nasopharyngeal      SARS-CoV-2, PCR Not detected NOTD      Rapid Influenza A By PCR Not detected NOTD      Rapid Influenza B By PCR Not detected NOTD     Group A Strep by PCR    Collection Time: 07/10/25  8:45 AM    Specimen: Swab; Throat   Result Value Ref Range    Strep Grp A PCR Not detected NOTD        Labs Reviewed   COVID-19 & INFLUENZA COMBO   STREP A, PCR       Radiologic Studies -   No orders to display         The laboratory results, imaging results, and other diagnostic exams were reviewed in the EMR.    Medical Decision Making   I am the first provider for this patient.    I reviewed the vital signs, available nursing notes, past medical history, past surgical history, family history and social history.    Vital Signs-Reviewed the patient's vital signs.       Records Reviewed: Personally, on initial evaluation    MDM:   DDX includes but is not limited to: COVID, influenza, strep, viral syndrome, pneumonia    Patient overall well-appearing, in no acute distress, and vital signs grossly within normal limits upon arrival to the emergency department.  Will obtain lab work for further evaluation of patient's symptoms.  Will continue to monitor and evaluate patient while in the emergency department.          Orders as below:  Orders Placed This Encounter   Procedures    COVID-19 & Influenza Combo    Group A Strep by PCR          ED Course:   ED Course as of 07/10/25

## 2025-07-10 NOTE — ED NOTES
Discharge medications reviewed with the patient and appropriate educational materials and side effects teaching were provided. Pt has no further questions or concerns. Pt escorted to waiting area.